# Patient Record
Sex: MALE | Race: WHITE | NOT HISPANIC OR LATINO | Employment: FULL TIME | URBAN - METROPOLITAN AREA
[De-identification: names, ages, dates, MRNs, and addresses within clinical notes are randomized per-mention and may not be internally consistent; named-entity substitution may affect disease eponyms.]

---

## 2023-02-24 ENCOUNTER — DOCUMENTATION (OUTPATIENT)
Dept: PRIMARY CARE | Facility: CLINIC | Age: 63
End: 2023-02-24
Payer: COMMERCIAL

## 2023-03-14 ENCOUNTER — PATIENT OUTREACH (OUTPATIENT)
Dept: PRIMARY CARE | Facility: CLINIC | Age: 63
End: 2023-03-14
Payer: COMMERCIAL

## 2023-03-14 DIAGNOSIS — Z79.4 TYPE 2 DIABETES MELLITUS WITH HYPERGLYCEMIA, WITH LONG-TERM CURRENT USE OF INSULIN (MULTI): ICD-10-CM

## 2023-03-14 DIAGNOSIS — E66.01 MORBID OBESITY (MULTI): ICD-10-CM

## 2023-03-14 DIAGNOSIS — E11.65 TYPE 2 DIABETES MELLITUS WITH HYPERGLYCEMIA, WITH LONG-TERM CURRENT USE OF INSULIN (MULTI): ICD-10-CM

## 2023-03-14 PROCEDURE — 99490 CHRNC CARE MGMT STAFF 1ST 20: CPT | Performed by: FAMILY MEDICINE

## 2023-03-14 PROCEDURE — 99439 CHRNC CARE MGMT STAF EA ADDL: CPT | Performed by: FAMILY MEDICINE

## 2023-03-14 RX ORDER — GABAPENTIN 600 MG/1
600 TABLET ORAL 3 TIMES DAILY
COMMUNITY
Start: 2023-02-20 | End: 2023-03-31 | Stop reason: DRUGHIGH

## 2023-03-14 RX ORDER — DULOXETIN HYDROCHLORIDE 60 MG/1
60 CAPSULE, DELAYED RELEASE ORAL DAILY
COMMUNITY
Start: 2023-02-20 | End: 2023-03-31 | Stop reason: DRUGHIGH

## 2023-03-14 RX ORDER — VALSARTAN AND HYDROCHLOROTHIAZIDE 320; 25 MG/1; MG/1
1 TABLET, FILM COATED ORAL DAILY
COMMUNITY
Start: 2023-02-20 | End: 2024-01-17 | Stop reason: SDUPTHER

## 2023-03-14 RX ORDER — DAPAGLIFLOZIN AND METFORMIN HYDROCHLORIDE 5; 1000 MG/1; MG/1
1 TABLET, FILM COATED, EXTENDED RELEASE ORAL 2 TIMES DAILY
COMMUNITY
Start: 2021-09-17 | End: 2024-01-17 | Stop reason: ALTCHOICE

## 2023-03-14 RX ORDER — SIMVASTATIN 10 MG/1
1 TABLET, FILM COATED ORAL DAILY
COMMUNITY
Start: 2020-09-28 | End: 2023-05-11

## 2023-03-14 RX ORDER — PEN NEEDLE, DIABETIC 32 GX 1/4"
NEEDLE, DISPOSABLE MISCELLANEOUS
COMMUNITY
Start: 2023-02-20 | End: 2023-09-26

## 2023-03-14 RX ORDER — METOPROLOL TARTRATE 100 MG/1
1 TABLET ORAL DAILY
COMMUNITY
Start: 2022-06-01 | End: 2024-04-25 | Stop reason: SDUPTHER

## 2023-03-14 RX ORDER — INSULIN ASPART 100 [IU]/ML
36 INJECTION, SOLUTION INTRAVENOUS; SUBCUTANEOUS
COMMUNITY
Start: 2021-09-13 | End: 2023-03-27 | Stop reason: ALTCHOICE

## 2023-03-14 RX ORDER — TIRZEPATIDE 7.5 MG/.5ML
12.5 INJECTION, SOLUTION SUBCUTANEOUS
COMMUNITY
Start: 2022-07-14 | End: 2023-03-27 | Stop reason: DRUGHIGH

## 2023-03-14 RX ORDER — PANTOPRAZOLE SODIUM 40 MG/1
1 TABLET, DELAYED RELEASE ORAL 2 TIMES DAILY
COMMUNITY
Start: 2020-09-14 | End: 2024-04-25 | Stop reason: SDUPTHER

## 2023-03-14 RX ORDER — INSULIN DEGLUDEC 200 U/ML
60 INJECTION, SOLUTION SUBCUTANEOUS 2 TIMES DAILY
COMMUNITY
Start: 2021-11-12 | End: 2023-03-27 | Stop reason: SDUPTHER

## 2023-03-14 RX ORDER — ALLOPURINOL 300 MG/1
1 TABLET ORAL DAILY
COMMUNITY
Start: 2021-11-23 | End: 2023-07-10 | Stop reason: SDUPTHER

## 2023-03-14 NOTE — CARE PLAN
Patient calls in regarding a terrible sinus headache that he has had for 5 days. He states that it is on the right side of his head and it can get very painful. At it's worst it becomes a 7-8 out of 10. He sometimes sees stars when he closes his eyes. He denies any sinus congestion or mucous production. In fact he feels like he is very dry. He has been using Afrin nasal spray, but states it only works for a few seconds and the pain comes back. He has also tried Aspirin and Cata-selzer but it only helps the pain for a few hours. Denies any weakness, slurred speech, or dizziness. He did have an episode yesterday where he actually fell out of bed. He does not know why. He just remembers waking up on the floor. We reviewed all his routine medications and his chart has been updated. He is asking if there is something else he should try for the headache, but I am not sure if this is really a sinus headache or not. Information being routed to PCP.

## 2023-03-15 NOTE — PROGRESS NOTES
Patient notified. He has to have his wife call me because she transports him. He wants to give this some more time before coming in. He states he is doing ok right now with analgesics. I did encourage an appt still.

## 2023-03-22 ENCOUNTER — PATIENT OUTREACH (OUTPATIENT)
Dept: PRIMARY CARE | Facility: CLINIC | Age: 63
End: 2023-03-22
Payer: COMMERCIAL

## 2023-03-23 ENCOUNTER — PATIENT OUTREACH (OUTPATIENT)
Dept: PRIMARY CARE | Facility: CLINIC | Age: 63
End: 2023-03-23
Payer: COMMERCIAL

## 2023-03-23 DIAGNOSIS — E11.65 TYPE 2 DIABETES MELLITUS WITH HYPERGLYCEMIA, WITH LONG-TERM CURRENT USE OF INSULIN (MULTI): ICD-10-CM

## 2023-03-23 DIAGNOSIS — Z79.4 TYPE 2 DIABETES MELLITUS WITH HYPERGLYCEMIA, WITH LONG-TERM CURRENT USE OF INSULIN (MULTI): ICD-10-CM

## 2023-03-23 DIAGNOSIS — I10 HYPERTENSION, UNSPECIFIED TYPE: ICD-10-CM

## 2023-03-23 DIAGNOSIS — E66.01 MORBID OBESITY (MULTI): ICD-10-CM

## 2023-03-23 PROBLEM — N52.9 ED (ERECTILE DYSFUNCTION): Status: ACTIVE | Noted: 2023-03-23

## 2023-03-23 PROBLEM — E78.5 HYPERLIPIDEMIA ASSOCIATED WITH TYPE 2 DIABETES MELLITUS (MULTI): Status: ACTIVE | Noted: 2023-03-23

## 2023-03-23 PROBLEM — G89.29 CHRONIC BILATERAL LOW BACK PAIN WITHOUT SCIATICA: Status: ACTIVE | Noted: 2023-03-23

## 2023-03-23 PROBLEM — M62.830 MUSCLE SPASM OF BACK: Status: ACTIVE | Noted: 2023-03-23

## 2023-03-23 PROBLEM — E55.9 VITAMIN D DEFICIENCY: Status: ACTIVE | Noted: 2023-03-23

## 2023-03-23 PROBLEM — F33.9 CHRONIC MAJOR DEPRESSIVE DISORDER, RECURRENT EPISODE (CMS-HCC): Status: ACTIVE | Noted: 2023-03-23

## 2023-03-23 PROBLEM — E78.2 MIXED HYPERLIPIDEMIA: Status: ACTIVE | Noted: 2023-03-23

## 2023-03-23 PROBLEM — M10.9 GOUT: Status: ACTIVE | Noted: 2023-03-23

## 2023-03-23 PROBLEM — R11.0 NAUSEA IN ADULT: Status: ACTIVE | Noted: 2023-03-23

## 2023-03-23 PROBLEM — E11.40 TYPE 2 DIABETES MELLITUS WITH DIABETIC NEUROPATHY, WITH LONG-TERM CURRENT USE OF INSULIN (MULTI): Status: ACTIVE | Noted: 2023-03-23

## 2023-03-23 PROBLEM — E11.69 HYPERLIPIDEMIA ASSOCIATED WITH TYPE 2 DIABETES MELLITUS (MULTI): Status: ACTIVE | Noted: 2023-03-23

## 2023-03-23 PROBLEM — K21.9 GASTROESOPHAGEAL REFLUX DISEASE WITHOUT ESOPHAGITIS: Status: ACTIVE | Noted: 2023-03-23

## 2023-03-23 PROBLEM — K11.20 SALIVARY GLAND INFECTION: Status: ACTIVE | Noted: 2023-03-23

## 2023-03-23 PROBLEM — B35.1 ONYCHOMYCOSIS OF TOENAIL: Status: ACTIVE | Noted: 2023-03-23

## 2023-03-23 PROBLEM — H61.23 BILATERAL IMPACTED CERUMEN: Status: ACTIVE | Noted: 2023-03-23

## 2023-03-23 PROBLEM — M54.50 CHRONIC BILATERAL LOW BACK PAIN WITHOUT SCIATICA: Status: ACTIVE | Noted: 2023-03-23

## 2023-03-23 NOTE — CARE PLAN
Patient returned my call. He is still dealing with sinus issues, but now it has traveled to his chest. He has a cough and congestion. Still having issues with a salivary gland as well. Taking OTC cold medicine every day. He was agreeable to an appt, so I made him one on Monday per his request with Dr. Jones. I asked him to please take a home covid test prior to this appt just to make sure before he comes in. He is agreeable.

## 2023-03-27 ENCOUNTER — OFFICE VISIT (OUTPATIENT)
Dept: PRIMARY CARE | Facility: CLINIC | Age: 63
End: 2023-03-27
Payer: COMMERCIAL

## 2023-03-27 VITALS
RESPIRATION RATE: 16 BRPM | DIASTOLIC BLOOD PRESSURE: 70 MMHG | OXYGEN SATURATION: 96 % | HEART RATE: 74 BPM | WEIGHT: 315 LBS | SYSTOLIC BLOOD PRESSURE: 124 MMHG | BODY MASS INDEX: 42.66 KG/M2 | HEIGHT: 72 IN | TEMPERATURE: 96.7 F

## 2023-03-27 DIAGNOSIS — B34.9 VIRAL SYNDROME: ICD-10-CM

## 2023-03-27 DIAGNOSIS — E53.8 VITAMIN B 12 DEFICIENCY: ICD-10-CM

## 2023-03-27 DIAGNOSIS — Z79.4 TYPE 2 DIABETES MELLITUS WITH DIABETIC NEUROPATHY, WITH LONG-TERM CURRENT USE OF INSULIN (MULTI): ICD-10-CM

## 2023-03-27 DIAGNOSIS — J40 BRONCHITIS: Primary | ICD-10-CM

## 2023-03-27 DIAGNOSIS — M10.00 IDIOPATHIC GOUT, UNSPECIFIED CHRONICITY, UNSPECIFIED SITE: ICD-10-CM

## 2023-03-27 DIAGNOSIS — E78.2 MIXED HYPERLIPIDEMIA: ICD-10-CM

## 2023-03-27 DIAGNOSIS — E55.9 VITAMIN D DEFICIENCY: ICD-10-CM

## 2023-03-27 DIAGNOSIS — E66.01 MORBID OBESITY WITH BODY MASS INDEX (BMI) OF 40.0 OR HIGHER (MULTI): ICD-10-CM

## 2023-03-27 DIAGNOSIS — R51.9 ACUTE NONINTRACTABLE HEADACHE, UNSPECIFIED HEADACHE TYPE: ICD-10-CM

## 2023-03-27 DIAGNOSIS — F33.9 CHRONIC MAJOR DEPRESSIVE DISORDER, RECURRENT EPISODE (CMS-HCC): ICD-10-CM

## 2023-03-27 DIAGNOSIS — Z00.00 ROUTINE GENERAL MEDICAL EXAMINATION AT A HEALTH CARE FACILITY: ICD-10-CM

## 2023-03-27 DIAGNOSIS — E11.40 TYPE 2 DIABETES MELLITUS WITH DIABETIC NEUROPATHY, WITH LONG-TERM CURRENT USE OF INSULIN (MULTI): ICD-10-CM

## 2023-03-27 LAB — POC HEMOGLOBIN A1C: 11.6 % (ref 4.2–6.5)

## 2023-03-27 PROCEDURE — 83036 HEMOGLOBIN GLYCOSYLATED A1C: CPT | Performed by: FAMILY MEDICINE

## 2023-03-27 PROCEDURE — 3074F SYST BP LT 130 MM HG: CPT | Performed by: FAMILY MEDICINE

## 2023-03-27 PROCEDURE — 3078F DIAST BP <80 MM HG: CPT | Performed by: FAMILY MEDICINE

## 2023-03-27 PROCEDURE — 1036F TOBACCO NON-USER: CPT | Performed by: FAMILY MEDICINE

## 2023-03-27 PROCEDURE — 99215 OFFICE O/P EST HI 40 MIN: CPT | Performed by: FAMILY MEDICINE

## 2023-03-27 RX ORDER — AMOXICILLIN AND CLAVULANATE POTASSIUM 875; 125 MG/1; MG/1
875 TABLET, FILM COATED ORAL 2 TIMES DAILY
Qty: 20 TABLET | Refills: 0 | Status: SHIPPED | OUTPATIENT
Start: 2023-03-27 | End: 2023-04-06

## 2023-03-27 RX ORDER — TIRZEPATIDE 12.5 MG/.5ML
12.5 INJECTION, SOLUTION SUBCUTANEOUS
Qty: 6 ML | Refills: 0 | Status: SHIPPED | OUTPATIENT
Start: 2023-03-27 | End: 2023-04-18 | Stop reason: SDUPTHER

## 2023-03-27 RX ORDER — INSULIN DEGLUDEC 200 U/ML
60 INJECTION, SOLUTION SUBCUTANEOUS 2 TIMES DAILY
Qty: 60 ML | Refills: 1 | Status: SHIPPED | OUTPATIENT
Start: 2023-03-27 | End: 2024-04-10 | Stop reason: SDUPTHER

## 2023-03-27 RX ORDER — INSULIN ASPART 100 [IU]/ML
36 INJECTION, SOLUTION INTRAVENOUS; SUBCUTANEOUS
Qty: 100 ML | Refills: 0 | Status: SHIPPED | OUTPATIENT
Start: 2023-03-27 | End: 2024-04-10 | Stop reason: SDUPTHER

## 2023-03-27 ASSESSMENT — ENCOUNTER SYMPTOMS
OCCASIONAL FEELINGS OF UNSTEADINESS: 0
DEPRESSION: 0
LOSS OF SENSATION IN FEET: 0

## 2023-03-27 ASSESSMENT — PATIENT HEALTH QUESTIONNAIRE - PHQ9
1. LITTLE INTEREST OR PLEASURE IN DOING THINGS: NOT AT ALL
2. FEELING DOWN, DEPRESSED OR HOPELESS: NOT AT ALL
SUM OF ALL RESPONSES TO PHQ9 QUESTIONS 1 AND 2: 0

## 2023-03-27 NOTE — PROGRESS NOTES
Subjective   Patient ID: Reza Red is a 62 y.o. male who presents for Hypertension, Migraine (X 10 days), swollen right salivary gland (Nothing has seemed to help), and Sinusitis (X 10 days to 2 weeks ).  Today he is accompanied by alone.     HPI  Has a parotid gland infection for 3-4 weeks- was on antibiotics     Review of Systems   Constitutional:  Positive for chills and fever. Negative for activity change, appetite change, diaphoresis, fatigue and unexpected weight change.   HENT:  Positive for dental problem, ear pain, facial swelling and sore throat. Negative for congestion, drooling, ear discharge, hearing loss, nosebleeds, postnasal drip, rhinorrhea, sinus pressure, sinus pain, sneezing, tinnitus, trouble swallowing and voice change.    Eyes:  Negative for pain, discharge, redness, itching and visual disturbance.   Respiratory:  Negative for cough, chest tightness, shortness of breath and wheezing.    Cardiovascular:  Negative for chest pain, palpitations and leg swelling.   Gastrointestinal:  Negative for abdominal pain, blood in stool, constipation, diarrhea, nausea and vomiting.   Endocrine: Negative for cold intolerance, heat intolerance, polydipsia, polyphagia and polyuria.   Genitourinary:  Negative for decreased urine volume, dysuria, flank pain, frequency, hematuria and urgency.   Musculoskeletal:  Positive for joint swelling and neck pain. Negative for arthralgias, back pain, gait problem and myalgias.   Skin:  Negative for color change, pallor, rash and wound.   Neurological:  Positive for weakness. Negative for dizziness.   Hematological:  Positive for adenopathy. Does not bruise/bleed easily.   Psychiatric/Behavioral:  Negative for agitation, behavioral problems and sleep disturbance. The patient is not nervous/anxious.        Objective   /70   Pulse 74   Temp 35.9 °C (96.7 °F)   Resp 16   Ht 1.829 m (6')   Wt (!) 165 kg (364 lb 6.4 oz)   SpO2 96%   BMI 49.42 kg/m²   BSA: 2.9  meters squared  Growth percentiles: Facility age limit for growth %chandrakant is 20 years. Facility age limit for growth %chandrakant is 20 years.     Physical Exam  Vitals and nursing note reviewed. Exam conducted with a chaperone present.   Constitutional:       General: He is not in acute distress.     Appearance: He is normal weight. He is ill-appearing and diaphoretic. He is not toxic-appearing.   HENT:      Head: Normocephalic.      Right Ear: Tympanic membrane, ear canal and external ear normal. There is no impacted cerumen.      Left Ear: Tympanic membrane, ear canal and external ear normal. There is no impacted cerumen.      Nose: Nose normal. No congestion or rhinorrhea.      Mouth/Throat:      Mouth: Mucous membranes are moist.      Pharynx: Oropharynx is clear. No oropharyngeal exudate or posterior oropharyngeal erythema.   Eyes:      General: No scleral icterus.        Right eye: No discharge.         Left eye: No discharge.      Extraocular Movements: Extraocular movements intact.      Conjunctiva/sclera: Conjunctivae normal.      Pupils: Pupils are equal, round, and reactive to light.   Neck:      Vascular: No carotid bruit.   Cardiovascular:      Rate and Rhythm: Normal rate and regular rhythm.      Pulses: Normal pulses.      Heart sounds: Normal heart sounds. No murmur heard.     No gallop.   Pulmonary:      Effort: No respiratory distress.      Breath sounds: No wheezing or rhonchi.   Chest:      Chest wall: No tenderness.   Abdominal:      General: Abdomen is flat. Bowel sounds are normal.      Palpations: Abdomen is soft. There is no mass.      Tenderness: There is no abdominal tenderness. There is no guarding or rebound.      Hernia: No hernia is present.   Musculoskeletal:         General: Swelling, tenderness and deformity present. Normal range of motion.      Cervical back: Normal range of motion. Tenderness present. No rigidity.      Right lower leg: No edema.      Left lower leg: No edema.    Lymphadenopathy:      Cervical: Cervical adenopathy present.   Skin:     General: Skin is warm.      Coloration: Skin is not pale.      Findings: No bruising, erythema or rash.   Neurological:      General: No focal deficit present.      Mental Status: He is alert and oriented to person, place, and time.      Sensory: No sensory deficit.      Coordination: Coordination normal.      Gait: Gait normal.      Deep Tendon Reflexes: Reflexes normal.   Psychiatric:         Mood and Affect: Mood normal.         Behavior: Behavior normal.         Thought Content: Thought content normal.         Judgment: Judgment normal.       Diabetic foot exam:   Left: Reflexes 4+    Vibratory sensation normal   Proprioception normal   Sharp/dull discrimination normal   Filament test present  Right: Reflexes 4+    Vibratory sensation normal   Proprioception normal   Sharp/dull discrimination normal   Filament test present   Assessment/Plan   Problem List Items Addressed This Visit       Gout    Relevant Orders    Uric Acid    Mixed hyperlipidemia    Morbid obesity with body mass index (BMI) of 40.0 or higher (CMS/[facility])    Vitamin D deficiency    Relevant Orders    Vitamin D, Total    Chronic major depressive disorder, recurrent episode (CMS/[facility])    Relevant Medications    DULoxetine (Cymbalta) 60 mg DR capsule    Type 2 diabetes mellitus with diabetic neuropathy, with long-term current use of insulin (CMS/[facility])    Relevant Medications    Tresiba FlexTouch U-200 200 unit/mL (3 mL) injection    NovoLOG FlexPen U-100 Insulin 100 unit/mL (3 mL) pen    tirzepatide (Mounjaro) 12.5 mg/0.5 mL pen injector    gabapentin (Neurontin) 600 mg tablet    Other Relevant Orders    POCT glycosylated hemoglobin (Hb A1C) manually resulted (Completed)    Lipid Panel    CBC and Auto Differential    Comprehensive Metabolic Panel     Other Visit Diagnoses       Bronchitis    -  Primary    Routine general medical examination at a health care facility         Vitamin B 12 deficiency        Relevant Orders    Vitamin B12    Acute nonintractable headache, unspecified headache type        Relevant Medications    amoxicillin-pot clavulanate (Augmentin) 875-125 mg tablet    Other Relevant Orders    Sedimentation Rate    C-Reactive Protein    TSH with reflex to Free T4 if abnormal    Viral syndrome        Relevant Medications    amoxicillin-pot clavulanate (Augmentin) 875-125 mg tablet        Assessment/Plan   Diabetes mellitus Type II, under poor control.  Discussed general issues about diabetes pathophysiology and management.  Counseling at today's visit: discussed the need for weight loss, focused on the need for regular aerobic exercise, focused on the need to adhere to the prescribed ADA diet, discussed the advantages of a diet low in carbohydrates, discussed sick day management, and discussed management of hypoglycemic episodes.  Educational material distributed.  Addressed ADA diet.  Suggested low cholesterol diet.  Encouraged aerobic exercise.

## 2023-03-31 RX ORDER — GABAPENTIN 600 MG/1
600 TABLET ORAL 3 TIMES DAILY
Status: SHIPPED | COMMUNITY
Start: 2023-03-31 | End: 2024-01-17 | Stop reason: SDUPTHER

## 2023-03-31 RX ORDER — DULOXETIN HYDROCHLORIDE 60 MG/1
60 CAPSULE, DELAYED RELEASE ORAL DAILY
Status: SHIPPED | COMMUNITY
Start: 2023-03-31 | End: 2023-05-11

## 2023-03-31 ASSESSMENT — ENCOUNTER SYMPTOMS
DYSURIA: 0
FEVER: 1
FREQUENCY: 0
EYE REDNESS: 0
CHEST TIGHTNESS: 0
FATIGUE: 0
CHILLS: 1
NECK PAIN: 1
ACTIVITY CHANGE: 0
BLOOD IN STOOL: 0
AGITATION: 0
VOICE CHANGE: 0
EYE PAIN: 0
FACIAL SWELLING: 1
TROUBLE SWALLOWING: 0
EYE DISCHARGE: 0
COUGH: 0
MYALGIAS: 0
NERVOUS/ANXIOUS: 0
JOINT SWELLING: 1
VOMITING: 0
DIARRHEA: 0
ADENOPATHY: 1
WEAKNESS: 1
POLYDIPSIA: 0
RHINORRHEA: 0
SHORTNESS OF BREATH: 0
HEMATURIA: 0
SORE THROAT: 1
ARTHRALGIAS: 0
SINUS PAIN: 0
BRUISES/BLEEDS EASILY: 0
SINUS PRESSURE: 0
APPETITE CHANGE: 0
WHEEZING: 0
DIAPHORESIS: 0
FLANK PAIN: 0
COLOR CHANGE: 0
CONSTIPATION: 0
SLEEP DISTURBANCE: 0
UNEXPECTED WEIGHT CHANGE: 0
NAUSEA: 0
DIZZINESS: 0
EYE ITCHING: 0
BACK PAIN: 0
ABDOMINAL PAIN: 0
WOUND: 0
PALPITATIONS: 0
POLYPHAGIA: 0

## 2023-04-18 ENCOUNTER — PATIENT OUTREACH (OUTPATIENT)
Dept: PRIMARY CARE | Facility: CLINIC | Age: 63
End: 2023-04-18
Payer: COMMERCIAL

## 2023-04-18 DIAGNOSIS — E11.65 TYPE 2 DIABETES MELLITUS WITH HYPERGLYCEMIA, WITH LONG-TERM CURRENT USE OF INSULIN (MULTI): ICD-10-CM

## 2023-04-18 DIAGNOSIS — Z79.4 TYPE 2 DIABETES MELLITUS WITH HYPERGLYCEMIA, WITH LONG-TERM CURRENT USE OF INSULIN (MULTI): ICD-10-CM

## 2023-04-18 DIAGNOSIS — E11.40 TYPE 2 DIABETES MELLITUS WITH DIABETIC NEUROPATHY, WITH LONG-TERM CURRENT USE OF INSULIN (MULTI): ICD-10-CM

## 2023-04-18 DIAGNOSIS — Z79.4 TYPE 2 DIABETES MELLITUS WITH DIABETIC NEUROPATHY, WITH LONG-TERM CURRENT USE OF INSULIN (MULTI): ICD-10-CM

## 2023-04-18 DIAGNOSIS — E66.01 MORBID OBESITY WITH BODY MASS INDEX (BMI) OF 40.0 OR HIGHER (MULTI): ICD-10-CM

## 2023-04-18 PROCEDURE — 99490 CHRNC CARE MGMT STAFF 1ST 20: CPT | Performed by: FAMILY MEDICINE

## 2023-04-18 RX ORDER — TIRZEPATIDE 12.5 MG/.5ML
12.5 INJECTION, SOLUTION SUBCUTANEOUS
Qty: 6 ML | Refills: 0 | Status: SHIPPED | OUTPATIENT
Start: 2023-04-18 | End: 2023-06-12 | Stop reason: SDUPTHER

## 2023-04-18 NOTE — CARE PLAN
Saw a missed call from patient. Returned his call. Patient was trying to see what time his next appt was. I confirmed it is on 5/4 at 9a. I wont be able to see him that day as I will be out of the office. But I did remind him he needs to get bw done prior to the appt and he needs to be fasting 10-12 hours. Patient is doing much better after he was given an antibiotic for his sinus problems and a salivary stone. He feels like there is still scar tissue there, but he is no longer waking up with a terrible taste in his mouth. He states the antibiotic seemed to do the trick. He states he has lost 60 pounds and hopes to lose some more by the time of his appt. He states his glucose levels are still high but starting to come down some. He did ask for a refill of his mounjaro which I will send to PCP to approve. No other concerns raised by patient.

## 2023-05-04 ENCOUNTER — APPOINTMENT (OUTPATIENT)
Dept: PRIMARY CARE | Facility: CLINIC | Age: 63
End: 2023-05-04
Payer: COMMERCIAL

## 2023-05-11 DIAGNOSIS — F33.9 CHRONIC MAJOR DEPRESSIVE DISORDER, RECURRENT EPISODE (CMS-HCC): ICD-10-CM

## 2023-05-11 RX ORDER — DULOXETIN HYDROCHLORIDE 60 MG/1
CAPSULE, DELAYED RELEASE ORAL
Qty: 90 CAPSULE | Refills: 0 | Status: SHIPPED | OUTPATIENT
Start: 2023-05-11 | End: 2023-07-10 | Stop reason: SDUPTHER

## 2023-05-11 RX ORDER — SIMVASTATIN 10 MG/1
TABLET, FILM COATED ORAL
Qty: 90 TABLET | Refills: 0 | Status: SHIPPED | OUTPATIENT
Start: 2023-05-11 | End: 2024-01-17 | Stop reason: SDUPTHER

## 2023-06-12 ENCOUNTER — PATIENT OUTREACH (OUTPATIENT)
Dept: PRIMARY CARE | Facility: CLINIC | Age: 63
End: 2023-06-12
Payer: COMMERCIAL

## 2023-06-12 DIAGNOSIS — E11.65 TYPE 2 DIABETES MELLITUS WITH HYPERGLYCEMIA, WITH LONG-TERM CURRENT USE OF INSULIN (MULTI): ICD-10-CM

## 2023-06-12 DIAGNOSIS — Z79.4 TYPE 2 DIABETES MELLITUS WITH DIABETIC NEUROPATHY, WITH LONG-TERM CURRENT USE OF INSULIN (MULTI): ICD-10-CM

## 2023-06-12 DIAGNOSIS — Z79.4 TYPE 2 DIABETES MELLITUS WITH HYPERGLYCEMIA, WITH LONG-TERM CURRENT USE OF INSULIN (MULTI): ICD-10-CM

## 2023-06-12 DIAGNOSIS — E66.01 MORBID OBESITY WITH BODY MASS INDEX (BMI) OF 40.0 OR HIGHER (MULTI): ICD-10-CM

## 2023-06-12 DIAGNOSIS — E11.40 TYPE 2 DIABETES MELLITUS WITH DIABETIC NEUROPATHY, WITH LONG-TERM CURRENT USE OF INSULIN (MULTI): ICD-10-CM

## 2023-06-12 PROCEDURE — 99490 CHRNC CARE MGMT STAFF 1ST 20: CPT | Performed by: FAMILY MEDICINE

## 2023-06-12 NOTE — PROGRESS NOTES
Call received from patient. He is in need of a refill of his Mounjaro from [facility] pharmacy, but he needs to make sure they have his new address since he did move. The address is correct in Epic already. I did call the pharmacy and gave them the new address. Then I pended an order for the Mounjaro to his PCP. We also scheduled the patient's annual physical exam for July 12th. He has active lab orders he needs to complete preferably not the day of his appt so we can have the results back to discuss. He has not been checking his glucose levels, but states he knows his sugars are better because he is not urinating multiple times throughout the day and night. He does promise he will place a sensor on his arm prior to his next appt. He states that the move has been very positive for him. He is able to be more active and cook more at home. He feels he has lost additional weight since his last appt. He does not know his exact weight because he does not have a scale at home that can weigh him. His last weight back in March was 364 and he feels he is somewhere around 340 or 350 based on the way his clothes are now fitting. He feels like his neuropathy is better as well because of this change. He is still complaining of daily sinus headaches, but states he is ok waiting until his physical to discuss with the doctor.

## 2023-06-13 RX ORDER — TIRZEPATIDE 12.5 MG/.5ML
12.5 INJECTION, SOLUTION SUBCUTANEOUS
Qty: 6 ML | Refills: 1 | Status: SHIPPED | OUTPATIENT
Start: 2023-06-13 | End: 2023-08-16 | Stop reason: SDUPTHER

## 2023-07-10 ENCOUNTER — PATIENT OUTREACH (OUTPATIENT)
Dept: PRIMARY CARE | Facility: CLINIC | Age: 63
End: 2023-07-10
Payer: COMMERCIAL

## 2023-07-10 DIAGNOSIS — Z79.4 TYPE 2 DIABETES MELLITUS WITH HYPERGLYCEMIA, WITH LONG-TERM CURRENT USE OF INSULIN (MULTI): ICD-10-CM

## 2023-07-10 DIAGNOSIS — F33.9 CHRONIC MAJOR DEPRESSIVE DISORDER, RECURRENT EPISODE (CMS-HCC): ICD-10-CM

## 2023-07-10 DIAGNOSIS — E11.65 TYPE 2 DIABETES MELLITUS WITH HYPERGLYCEMIA, WITH LONG-TERM CURRENT USE OF INSULIN (MULTI): ICD-10-CM

## 2023-07-10 DIAGNOSIS — M10.00 IDIOPATHIC GOUT, UNSPECIFIED CHRONICITY, UNSPECIFIED SITE: ICD-10-CM

## 2023-07-10 DIAGNOSIS — Z12.5 SCREENING PSA (PROSTATE SPECIFIC ANTIGEN): ICD-10-CM

## 2023-07-10 PROCEDURE — 99490 CHRNC CARE MGMT STAFF 1ST 20: CPT | Performed by: FAMILY MEDICINE

## 2023-07-10 RX ORDER — ALLOPURINOL 300 MG/1
300 TABLET ORAL DAILY
Qty: 90 TABLET | Refills: 0 | Status: SHIPPED | OUTPATIENT
Start: 2023-07-10 | End: 2024-04-09

## 2023-07-10 RX ORDER — DULOXETIN HYDROCHLORIDE 60 MG/1
60 CAPSULE, DELAYED RELEASE ORAL DAILY
Qty: 90 CAPSULE | Refills: 0 | Status: SHIPPED | OUTPATIENT
Start: 2023-07-10 | End: 2024-04-09

## 2023-07-10 NOTE — PROGRESS NOTES
Patient called in to change his physical exam appt to be in August so we could set this up on the same day as his wife. New appt for 8/10. Lab work order already placed, except A1C. I will add this order to his current order for blood work.   Patient has not been wearing his clau. He states that he does feel better overall. He is urinating less at nighttime. He has lost a bit of weight. He has had to buy smaller pants and underwear. He states he will put one on so we can get some current data from it. He is trying to move more. He did mention he got a Power Wheelchair although we did not talk about times when he uses it.   He is very happy with his move to his new home. He has 2 cats now and is quite happy about this.   Patient needs a refill on his Mood medication and gout medication. Presumably his Duloxetine and Allopurinol to [facility] pharmacy. I will pend to PCP.

## 2023-07-12 ENCOUNTER — APPOINTMENT (OUTPATIENT)
Dept: PRIMARY CARE | Facility: CLINIC | Age: 63
End: 2023-07-12
Payer: COMMERCIAL

## 2023-08-10 ENCOUNTER — OFFICE VISIT (OUTPATIENT)
Dept: PRIMARY CARE | Facility: CLINIC | Age: 63
End: 2023-08-10
Payer: COMMERCIAL

## 2023-08-10 ENCOUNTER — LAB (OUTPATIENT)
Dept: LAB | Facility: LAB | Age: 63
End: 2023-08-10
Payer: COMMERCIAL

## 2023-08-10 VITALS
OXYGEN SATURATION: 95 % | WEIGHT: 315 LBS | DIASTOLIC BLOOD PRESSURE: 86 MMHG | SYSTOLIC BLOOD PRESSURE: 126 MMHG | BODY MASS INDEX: 42.66 KG/M2 | TEMPERATURE: 98.2 F | HEIGHT: 72 IN | HEART RATE: 110 BPM | RESPIRATION RATE: 16 BRPM

## 2023-08-10 DIAGNOSIS — G44.52 NEW PERSISTENT DAILY HEADACHE: ICD-10-CM

## 2023-08-10 DIAGNOSIS — Z12.12 ENCOUNTER FOR SCREENING FOR COLORECTAL MALIGNANT NEOPLASM: ICD-10-CM

## 2023-08-10 DIAGNOSIS — E66.01 MORBID OBESITY WITH BODY MASS INDEX (BMI) OF 40.0 OR HIGHER (MULTI): ICD-10-CM

## 2023-08-10 DIAGNOSIS — Z00.00 ENCOUNTER FOR ANNUAL GENERAL MEDICAL EXAMINATION WITHOUT ABNORMAL FINDINGS IN ADULT: ICD-10-CM

## 2023-08-10 DIAGNOSIS — F33.9 CHRONIC MAJOR DEPRESSIVE DISORDER, RECURRENT EPISODE (CMS-HCC): ICD-10-CM

## 2023-08-10 DIAGNOSIS — E11.65 TYPE 2 DIABETES MELLITUS WITH HYPERGLYCEMIA, WITH LONG-TERM CURRENT USE OF INSULIN (MULTI): ICD-10-CM

## 2023-08-10 DIAGNOSIS — Z00.00 ENCOUNTER FOR ANNUAL GENERAL MEDICAL EXAMINATION WITHOUT ABNORMAL FINDINGS IN ADULT: Primary | ICD-10-CM

## 2023-08-10 DIAGNOSIS — E11.69 HYPERLIPIDEMIA ASSOCIATED WITH TYPE 2 DIABETES MELLITUS (MULTI): ICD-10-CM

## 2023-08-10 DIAGNOSIS — M10.00 IDIOPATHIC GOUT, UNSPECIFIED CHRONICITY, UNSPECIFIED SITE: ICD-10-CM

## 2023-08-10 DIAGNOSIS — Z12.5 ENCOUNTER FOR SCREENING FOR MALIGNANT NEOPLASM OF PROSTATE: ICD-10-CM

## 2023-08-10 DIAGNOSIS — H53.9 VISUAL CHANGES: ICD-10-CM

## 2023-08-10 DIAGNOSIS — H61.23 EXCESSIVE CERUMEN IN BOTH EAR CANALS: ICD-10-CM

## 2023-08-10 DIAGNOSIS — Z79.4 TYPE 2 DIABETES MELLITUS WITH HYPERGLYCEMIA, WITH LONG-TERM CURRENT USE OF INSULIN (MULTI): ICD-10-CM

## 2023-08-10 DIAGNOSIS — Z79.4 TYPE 2 DIABETES MELLITUS WITH DIABETIC NEUROPATHY, WITH LONG-TERM CURRENT USE OF INSULIN (MULTI): ICD-10-CM

## 2023-08-10 DIAGNOSIS — Z23 NEED FOR TETANUS, DIPHTHERIA, AND ACELLULAR PERTUSSIS (TDAP) VACCINE: ICD-10-CM

## 2023-08-10 DIAGNOSIS — Z12.11 ENCOUNTER FOR SCREENING FOR COLORECTAL MALIGNANT NEOPLASM: ICD-10-CM

## 2023-08-10 DIAGNOSIS — E78.5 HYPERLIPIDEMIA ASSOCIATED WITH TYPE 2 DIABETES MELLITUS (MULTI): ICD-10-CM

## 2023-08-10 DIAGNOSIS — E11.40 TYPE 2 DIABETES MELLITUS WITH DIABETIC NEUROPATHY, WITH LONG-TERM CURRENT USE OF INSULIN (MULTI): ICD-10-CM

## 2023-08-10 DIAGNOSIS — Z12.5 SCREENING PSA (PROSTATE SPECIFIC ANTIGEN): ICD-10-CM

## 2023-08-10 DIAGNOSIS — E55.9 VITAMIN D DEFICIENCY: ICD-10-CM

## 2023-08-10 PROBLEM — R11.0 NAUSEA IN ADULT: Status: RESOLVED | Noted: 2023-03-23 | Resolved: 2023-08-10

## 2023-08-10 PROBLEM — K11.20 SALIVARY GLAND INFECTION: Status: RESOLVED | Noted: 2023-03-23 | Resolved: 2023-08-10

## 2023-08-10 LAB
POC ALBUMIN /CREATININE RATIO MANUALLY ENTERED: ABNORMAL UG/MG CREAT
POC HEMOGLOBIN A1C: 12.8 % (ref 4.2–6.5)
POC URINE ALBUMIN: 30 MG/L
POC URINE CREATININE: 50 MG/DL

## 2023-08-10 PROCEDURE — 99214 OFFICE O/P EST MOD 30 MIN: CPT | Performed by: FAMILY MEDICINE

## 2023-08-10 PROCEDURE — 84153 ASSAY OF PSA TOTAL: CPT

## 2023-08-10 PROCEDURE — 1036F TOBACCO NON-USER: CPT | Performed by: FAMILY MEDICINE

## 2023-08-10 PROCEDURE — 83525 ASSAY OF INSULIN: CPT

## 2023-08-10 PROCEDURE — 69209 REMOVE IMPACTED EAR WAX UNI: CPT | Performed by: FAMILY MEDICINE

## 2023-08-10 PROCEDURE — 84402 ASSAY OF FREE TESTOSTERONE: CPT

## 2023-08-10 PROCEDURE — 82044 UR ALBUMIN SEMIQUANTITATIVE: CPT | Performed by: FAMILY MEDICINE

## 2023-08-10 PROCEDURE — 84550 ASSAY OF BLOOD/URIC ACID: CPT

## 2023-08-10 PROCEDURE — 84443 ASSAY THYROID STIM HORMONE: CPT

## 2023-08-10 PROCEDURE — 99396 PREV VISIT EST AGE 40-64: CPT | Performed by: FAMILY MEDICINE

## 2023-08-10 PROCEDURE — 36415 COLL VENOUS BLD VENIPUNCTURE: CPT

## 2023-08-10 PROCEDURE — 3079F DIAST BP 80-89 MM HG: CPT | Performed by: FAMILY MEDICINE

## 2023-08-10 PROCEDURE — 2028F FOOT EXAM PERFORMED: CPT | Performed by: FAMILY MEDICINE

## 2023-08-10 PROCEDURE — 83036 HEMOGLOBIN GLYCOSYLATED A1C: CPT

## 2023-08-10 PROCEDURE — 86803 HEPATITIS C AB TEST: CPT

## 2023-08-10 PROCEDURE — 90715 TDAP VACCINE 7 YRS/> IM: CPT | Performed by: FAMILY MEDICINE

## 2023-08-10 PROCEDURE — 90471 IMMUNIZATION ADMIN: CPT | Performed by: FAMILY MEDICINE

## 2023-08-10 PROCEDURE — 87389 HIV-1 AG W/HIV-1&-2 AB AG IA: CPT

## 2023-08-10 PROCEDURE — 84403 ASSAY OF TOTAL TESTOSTERONE: CPT

## 2023-08-10 PROCEDURE — 85652 RBC SED RATE AUTOMATED: CPT

## 2023-08-10 PROCEDURE — 83735 ASSAY OF MAGNESIUM: CPT

## 2023-08-10 PROCEDURE — 86140 C-REACTIVE PROTEIN: CPT

## 2023-08-10 PROCEDURE — 3074F SYST BP LT 130 MM HG: CPT | Performed by: FAMILY MEDICINE

## 2023-08-10 PROCEDURE — 3046F HEMOGLOBIN A1C LEVEL >9.0%: CPT | Performed by: FAMILY MEDICINE

## 2023-08-10 ASSESSMENT — ENCOUNTER SYMPTOMS
DIZZINESS: 0
COUGH: 0
SLEEP DISTURBANCE: 0
POLYDIPSIA: 0
SORE THROAT: 0
PALPITATIONS: 0
DIARRHEA: 0
FREQUENCY: 0
ACTIVITY CHANGE: 0
RHINORRHEA: 0
EYE DISCHARGE: 0
FATIGUE: 0
OCCASIONAL FEELINGS OF UNSTEADINESS: 0
FACIAL SWELLING: 0
SHORTNESS OF BREATH: 0
ABDOMINAL PAIN: 0
VOICE CHANGE: 0
APPETITE CHANGE: 0
NERVOUS/ANXIOUS: 0
NECK STIFFNESS: 0
DYSURIA: 0
BACK PAIN: 0
DEPRESSION: 0
WOUND: 0
EYE REDNESS: 0
CHILLS: 0
LOSS OF SENSATION IN FEET: 0
CONSTIPATION: 0
COLOR CHANGE: 0
TROUBLE SWALLOWING: 0
SINUS PRESSURE: 0
HEMATURIA: 0
FEVER: 0
NAUSEA: 0
CHEST TIGHTNESS: 0
ARTHRALGIAS: 0
POLYPHAGIA: 0
JOINT SWELLING: 0
VOMITING: 0
AGITATION: 0
EYE PAIN: 0
EYE ITCHING: 0
BLOOD IN STOOL: 0
FLANK PAIN: 0
WHEEZING: 0
SINUS PAIN: 0
MYALGIAS: 0
DIAPHORESIS: 0
ADENOPATHY: 0
UNEXPECTED WEIGHT CHANGE: 0
BRUISES/BLEEDS EASILY: 0

## 2023-08-10 ASSESSMENT — PATIENT HEALTH QUESTIONNAIRE - PHQ9
SUM OF ALL RESPONSES TO PHQ9 QUESTIONS 1 AND 2: 0
2. FEELING DOWN, DEPRESSED OR HOPELESS: NOT AT ALL
1. LITTLE INTEREST OR PLEASURE IN DOING THINGS: NOT AT ALL

## 2023-08-10 NOTE — PATIENT INSTRUCTIONS
F/U in 3 months for DM and htn and headaches  Call if headaches do not improve with Zyrtec daily

## 2023-08-10 NOTE — PROGRESS NOTES
Subjective   Patient ID: Reza Red is a 62 y.o. male who presents for Annual Exam.  Today he is accompanied by accompanied by spouse.     Well Adult Physical   Patient here for a comprehensive physical exam.The patient reports problems - daily persistent headaches      Do you take any herbs or supplements that were not prescribed by a doctor? no   Are you taking calcium supplements? no   Are you taking aspirin daily? no     History:  Date last prostate exam: 2021  Date last PSA: 2022          Having persistent headaches for the last 2 months  Taking tylenol daily  Saline helps  Has flashing lights and visual changes about 1 week ago.    Review of Systems   Constitutional:  Negative for activity change, appetite change, chills, diaphoresis, fatigue, fever and unexpected weight change.   HENT:  Negative for congestion, dental problem, drooling, ear discharge, ear pain, facial swelling, hearing loss, nosebleeds, postnasal drip, rhinorrhea, sinus pressure, sinus pain, sneezing, sore throat, tinnitus, trouble swallowing and voice change.    Eyes:  Negative for pain, discharge, redness, itching and visual disturbance.   Respiratory:  Negative for cough, chest tightness, shortness of breath and wheezing.    Cardiovascular:  Negative for chest pain, palpitations and leg swelling.   Gastrointestinal:  Negative for abdominal pain, blood in stool, constipation, diarrhea, nausea and vomiting.   Endocrine: Negative for cold intolerance, heat intolerance, polydipsia, polyphagia and polyuria.   Genitourinary:  Negative for decreased urine volume, dysuria, flank pain, frequency, hematuria and urgency.   Musculoskeletal:  Negative for arthralgias, back pain, gait problem, joint swelling, myalgias and neck stiffness.   Skin:  Negative for color change, pallor, rash and wound.   Neurological:  Negative for dizziness.   Hematological:  Negative for adenopathy. Does not bruise/bleed easily.   Psychiatric/Behavioral:  Negative for  agitation, behavioral problems and sleep disturbance. The patient is not nervous/anxious.        Objective   Blood Pressure 126/86   Pulse 110   Temperature 36.8 °C (98.2 °F)   Respiration 16   Height 1.829 m (6')   Weight (Abnormal) 157 kg (345 lb 12.8 oz)   Oxygen Saturation 95%   Body Mass Index 46.90 kg/m²   BSA: 2.82 meters squared  Growth percentiles: Facility age limit for growth %chandrakant is 20 years. Facility age limit for growth %chandrakant is 20 years.     Physical Exam  Vitals and nursing note reviewed. Exam conducted with a chaperone present.   Constitutional:       General: He is not in acute distress.     Appearance: Normal appearance. He is normal weight. He is not ill-appearing.   HENT:      Head: Normocephalic.      Right Ear: Tympanic membrane, ear canal and external ear normal.      Left Ear: Tympanic membrane, ear canal and external ear normal.      Nose: Nose normal. No rhinorrhea.      Mouth/Throat:      Mouth: Mucous membranes are moist.      Pharynx: Oropharynx is clear.   Eyes:      Extraocular Movements: Extraocular movements intact.      Conjunctiva/sclera: Conjunctivae normal.      Pupils: Pupils are equal, round, and reactive to light.   Cardiovascular:      Rate and Rhythm: Normal rate and regular rhythm.      Pulses: Normal pulses.      Heart sounds: Normal heart sounds.   Pulmonary:      Effort: Pulmonary effort is normal. No respiratory distress.      Breath sounds: Normal breath sounds. No wheezing.   Abdominal:      General: Abdomen is flat. Bowel sounds are normal.      Palpations: Abdomen is soft.      Tenderness: There is no abdominal tenderness. There is no guarding or rebound.      Hernia: No hernia is present.   Musculoskeletal:         General: Normal range of motion.      Cervical back: Normal range of motion and neck supple. No rigidity or tenderness.      Right lower leg: No edema.      Left lower leg: No edema.   Lymphadenopathy:      Cervical: No cervical adenopathy.    Skin:     General: Skin is warm and dry.      Capillary Refill: Capillary refill takes more than 3 seconds.   Neurological:      General: No focal deficit present.      Mental Status: He is alert and oriented to person, place, and time.      Sensory: No sensory deficit.      Motor: No weakness.      Coordination: Coordination normal.   Psychiatric:         Mood and Affect: Mood normal.         Behavior: Behavior normal.         Thought Content: Thought content normal.         Judgment: Judgment normal.       Diabetic foot exam:   Left: Reflexes 4+    Vibratory sensation normal   Proprioception normal   Sharp/dull discrimination normal   Filament test present  Right: Reflexes 4+    Vibratory sensation normal   Proprioception normal   Sharp/dull discrimination normal   Filament test present    Assessment/Plan   Problem List Items Addressed This Visit       Gout    Relevant Orders    Uric Acid    Vitamin D deficiency    Relevant Orders    Vitamin D, Total    Diabetes mellitus with hyperglycemia, with long-term current use of insulin (CMS/[facility])    Relevant Orders    POCT Albumin random urine manually resulted    POCT glycosylated hemoglobin (Hb A1C) manually resulted     Diabetes Foot Exam (Completed)     Other Visit Diagnoses       Encounter for annual general medical examination without abnormal findings in adult    -  Primary    Relevant Orders    CBC and Auto Differential    Comprehensive Metabolic Panel    Lipid Panel    HIV 1/2 Antigen/Antibody Screen with Reflex to Confirmation    Hepatitis C Antibody    CT cardiac scoring wo IV contrast    Encounter for screening for colorectal malignant neoplasm        Relevant Orders    POCT Fecal occult immunoassay-ifob manually resulted    Cologuard® colon cancer screening    Encounter for screening for malignant neoplasm of prostate        Relevant Orders    Prostate Specific Antigen, Screen    Vitamin B12 deficiency        Relevant Orders    Vitamin B12    Need for  "tetanus, diphtheria, and acellular pertussis (Tdap) vaccine        Relevant Orders    Tdap vaccine, age 10 years and older (BOOSTRIX)    New persistent daily headache        Relevant Orders    C-Reactive Protein    Sedimentation Rate    TSH with reflex to Free T4 if abnormal    Magnesium    Morbid obesity (CMS/HCC)        Relevant Orders    Insulin, Random    Testosterone,Free and Total    Visual changes              Current Outpatient Medications   Medication Sig Dispense Refill    allopurinol (Zyloprim) 300 mg tablet Take 1 tablet (300 mg) by mouth once daily. 90 tablet 0    BD Ultra-Fine Micro Pen Needle 32 gauge x 1/4\" needle       DULoxetine (Cymbalta) 60 mg DR capsule Take 1 capsule (60 mg) by mouth once daily. Do not crush or chew. 90 capsule 0    flash glucose sensor (FREESTYLE AMY 14 DAY SENSOR [facility] Use as directed every 2 weeks      gabapentin (Neurontin) 600 mg tablet Take 1 tablet (600 mg) by mouth 3 times a day.      metoprolol tartrate (Lopressor) 100 mg tablet Take 1 tablet (100 mg) by mouth 3 times a day.      pantoprazole (ProtoNix) 40 mg EC tablet Take 1 tablet (40 mg) by mouth 2 times a day.      simvastatin (Zocor) 10 mg tablet TAKE ONE TABLET BY MOUTH ONE TIME DAILY 90 tablet 0    tirzepatide (Mounjaro) 12.5 mg/0.5 mL pen injector Inject 12.5 mg under the skin 1 (one) time per week. 6 mL 1    valsartan-hydrochlorothiazide (Diovan-HCT) 320-25 mg tablet Take 1 tablet by mouth once daily.      Xigduo XR 5-1,000 mg Take 1 tablet by mouth 2 times a day.      NovoLOG FlexPen U-100 Insulin 100 unit/mL (3 mL) pen Inject 36 Units under the skin in the morning and 36 Units at noon and 36 Units in the evening. Inject with meals. 100 mL 0    Tresiba FlexTouch U-200 200 unit/mL (3 mL) injection Inject 60 Units under the skin in the morning and 60 Units before bedtime. 60 mL 1     No current facility-administered medications for this visit.     Patient ID: Reza Red is a 62 y.o. male.    Ear Cerumen " Removal    Date/Time: 8/10/2023 11:13 AM    Performed by: Alis Jones MD  Authorized by: Alis Jones MD    Consent:     Consent obtained:  Verbal    Consent given by:  Patient    Risks, benefits, and alternatives were discussed: yes      Risks discussed:  Bleeding, infection, incomplete removal, dizziness, TM perforation and pain    Alternatives discussed:  Delayed treatment, alternative treatment, observation, referral and no treatment  Universal protocol:     Procedure explained and questions answered to patient or proxy's satisfaction: yes      Patient identity confirmed:  Verbally with patient  Procedure details:     Location:  L ear and R ear    Procedure type: irrigation      Procedure outcomes: cerumen removed    Post-procedure details:     Inspection:  Ear canal clear, no bleeding and TM intact    Hearing quality:  Normal    Procedure completion:  Tolerated well, no immediate complications      F/U in 3 months for DM and htn and headaches  Call if headaches do not improve with Zyrtec daily

## 2023-08-11 LAB
C REACTIVE PROTEIN (MG/L) IN SER/PLAS: 1.26 MG/DL
ESTIMATED AVERAGE GLUCOSE FOR HBA1C: 278 MG/DL
HEMOGLOBIN A1C/HEMOGLOBIN TOTAL IN BLOOD: 11.3 %
HEPATITIS C VIRUS AB PRESENCE IN SERUM: NONREACTIVE
HIV 1/ 2 AG/AB SCREEN: NONREACTIVE
INSULIN: 22 UIU/ML (ref 3–25)
MAGNESIUM (MG/DL) IN SER/PLAS: 2.09 MG/DL (ref 1.6–2.4)
PROSTATE SPECIFIC ANTIGEN,SCREEN: 0.58 NG/ML (ref 0–4)
SEDIMENTATION RATE, ERYTHROCYTE: 46 MM/H (ref 0–20)
THYROTROPIN (MIU/L) IN SER/PLAS BY DETECTION LIMIT <= 0.05 MIU/L: 1.15 MIU/L (ref 0.44–3.98)
URATE (MG/DL) IN SER/PLAS: 7.4 MG/DL (ref 4–7.5)

## 2023-08-16 ENCOUNTER — PATIENT OUTREACH (OUTPATIENT)
Dept: PRIMARY CARE | Facility: CLINIC | Age: 63
End: 2023-08-16
Payer: COMMERCIAL

## 2023-08-16 DIAGNOSIS — Z79.4 TYPE 2 DIABETES MELLITUS WITH DIABETIC NEUROPATHY, WITH LONG-TERM CURRENT USE OF INSULIN (MULTI): ICD-10-CM

## 2023-08-16 DIAGNOSIS — E11.40 TYPE 2 DIABETES MELLITUS WITH DIABETIC NEUROPATHY, WITH LONG-TERM CURRENT USE OF INSULIN (MULTI): ICD-10-CM

## 2023-08-16 LAB
TESTOSTERONE FREE (CHAN): 37.6 PG/ML (ref 35–155)
TESTOSTERONE,TOTAL,LC-MS/MS: 127 NG/DL (ref 250–1100)

## 2023-08-16 PROCEDURE — 99490 CHRNC CARE MGMT STAFF 1ST 20: CPT | Performed by: FAMILY MEDICINE

## 2023-08-16 RX ORDER — TIRZEPATIDE 12.5 MG/.5ML
12.5 INJECTION, SOLUTION SUBCUTANEOUS
Qty: 6 ML | Refills: 1 | Status: SHIPPED | OUTPATIENT
Start: 2023-08-16 | End: 2023-08-16

## 2023-08-16 NOTE — PROGRESS NOTES
Patient calls me asking for a refill of his Mounjaro from the  pharmacy he normally gets this from. Rx pended to PCP.     We talked about his recent visit to the office. How his A1C went up to 11.3 (on blood work and 12.8 in office. Unsure why he had this done twice). He states he was losing weight and feeling better so he stopped his insulin. He has started back on them. He is aware he should not stop his diabetes (or any medicine) unless directed to by a provider. We reviewed the dosages of his Tresiba and Humalog. He states he is being compliant now. He states he does have his Freestyle Scotty sensor on, but admits he is not good at scanning it. I emphasized the importance of scanning at least every 8 hours. If he does not scan, the readings will not be captured. He has to scan every 8 hours or more in order to get a good picture of what his glucose levels look like. He is being followed by our office through [facility]. I plan on watching this and reaching out to him next week to follow up on this very closely.

## 2023-08-23 ENCOUNTER — PATIENT OUTREACH (OUTPATIENT)
Dept: PRIMARY CARE | Facility: CLINIC | Age: 63
End: 2023-08-23
Payer: COMMERCIAL

## 2023-08-23 DIAGNOSIS — Z79.4 TYPE 2 DIABETES MELLITUS WITH DIABETIC NEUROPATHY, WITH LONG-TERM CURRENT USE OF INSULIN (MULTI): ICD-10-CM

## 2023-08-23 DIAGNOSIS — E11.40 TYPE 2 DIABETES MELLITUS WITH DIABETIC NEUROPATHY, WITH LONG-TERM CURRENT USE OF INSULIN (MULTI): ICD-10-CM

## 2023-08-23 DIAGNOSIS — E66.01 MORBID OBESITY WITH BODY MASS INDEX (BMI) OF 40.0 OR HIGHER (MULTI): ICD-10-CM

## 2023-08-23 NOTE — PROGRESS NOTES
Chart review completed. CCM outreach attempted. No answer. I left a message letting patient know I want to talk about his sugar levels. I went to our [employer] and there are no readings in the last week. He either is not scanning or not uploading the information. And I would like to talk to him about this further.

## 2023-08-28 ENCOUNTER — PATIENT OUTREACH (OUTPATIENT)
Dept: PRIMARY CARE | Facility: CLINIC | Age: 63
End: 2023-08-28
Payer: COMMERCIAL

## 2023-08-28 DIAGNOSIS — E11.40 TYPE 2 DIABETES MELLITUS WITH DIABETIC NEUROPATHY, WITH LONG-TERM CURRENT USE OF INSULIN (MULTI): ICD-10-CM

## 2023-08-28 DIAGNOSIS — Z79.4 TYPE 2 DIABETES MELLITUS WITH DIABETIC NEUROPATHY, WITH LONG-TERM CURRENT USE OF INSULIN (MULTI): ICD-10-CM

## 2023-08-28 NOTE — PROGRESS NOTES
Call placed to patient. No information on [employer] for patient regarding his glucose readings. I called him to see what is going on. He states he keeps forgetting to scan. But then says that there is no way his sensor could be correct because it says his levels are in the 400's. He states there is no way because he isn't up urinating all night long. I tried to explain that this is not the only symptom he could have. His A1C was elevated, so he very well could have these levels. He is going to put on another sensor and promises to scan it. He is aware I am calling him back next week and I am expecting to see some type of information. He does state he is compliant with his diabetic medications. He states he appreciates me caring about him.

## 2023-09-06 ENCOUNTER — PATIENT OUTREACH (OUTPATIENT)
Dept: PRIMARY CARE | Facility: CLINIC | Age: 63
End: 2023-09-06
Payer: COMMERCIAL

## 2023-09-06 DIAGNOSIS — Z79.4 TYPE 2 DIABETES MELLITUS WITH DIABETIC NEUROPATHY, WITH LONG-TERM CURRENT USE OF INSULIN (MULTI): ICD-10-CM

## 2023-09-06 DIAGNOSIS — E66.01 MORBID OBESITY WITH BODY MASS INDEX (BMI) OF 40.0 OR HIGHER (MULTI): ICD-10-CM

## 2023-09-06 DIAGNOSIS — E11.40 TYPE 2 DIABETES MELLITUS WITH DIABETIC NEUROPATHY, WITH LONG-TERM CURRENT USE OF INSULIN (MULTI): ICD-10-CM

## 2023-09-13 ENCOUNTER — PATIENT OUTREACH (OUTPATIENT)
Dept: PRIMARY CARE | Facility: CLINIC | Age: 63
End: 2023-09-13
Payer: COMMERCIAL

## 2023-09-13 DIAGNOSIS — E11.40 TYPE 2 DIABETES MELLITUS WITH DIABETIC NEUROPATHY, WITH LONG-TERM CURRENT USE OF INSULIN (MULTI): ICD-10-CM

## 2023-09-13 DIAGNOSIS — Z79.4 TYPE 2 DIABETES MELLITUS WITH DIABETIC NEUROPATHY, WITH LONG-TERM CURRENT USE OF INSULIN (MULTI): ICD-10-CM

## 2023-09-13 DIAGNOSIS — R51.9 ACUTE NONINTRACTABLE HEADACHE, UNSPECIFIED HEADACHE TYPE: ICD-10-CM

## 2023-09-13 DIAGNOSIS — E66.01 MORBID OBESITY WITH BODY MASS INDEX (BMI) OF 40.0 OR HIGHER (MULTI): ICD-10-CM

## 2023-09-13 DIAGNOSIS — I10 HYPERTENSION, UNSPECIFIED TYPE: ICD-10-CM

## 2023-09-13 LAB — NONINV COLON CA DNA+OCC BLD SCRN STL QL: POSITIVE

## 2023-09-13 NOTE — PROGRESS NOTES
[facility] call to patient. Patient is not scanning his CGM throughout the day like he is supposed to. He has checked his glucose level 5 times total in the last 14 days. I have encouraged him several times to be checking this at least every 8 hours in order to get a continuous amount of data. Patient states he forgets because he is in so much pain. I asked him about this further and he states he has had this pain behind his right eye everyday for about 3 months. He also states that he has no idea why his sugar levels are so high. He denies eating any sweets and states he has 3 small meals daily. He states he eats very little carbs. He is compliant with his insulin dosages.    I discussed with PCP and she suggested adding Farxiga 10mg daily for his sugars. She also said he may need Endocrinology at this point unless Jarret (pharmD) has any other suggestions. Also she said that he needs to go to an ophthalmologist regarding his eyes. And also should get an MRI brain w/o contrast for the pain.     I then discussed with Jarret as well. He states that the Farxiga would be helpful for kidney protection but he does not expect it to lower his glucose levels significantly. He suggested Endo is probably the next best step because he may need U-500 insulin.

## 2023-09-15 ENCOUNTER — PATIENT OUTREACH (OUTPATIENT)
Dept: PRIMARY CARE | Facility: CLINIC | Age: 63
End: 2023-09-15
Payer: COMMERCIAL

## 2023-09-15 DIAGNOSIS — G44.52 NEW PERSISTENT DAILY HEADACHE: ICD-10-CM

## 2023-09-15 DIAGNOSIS — Z79.4 TYPE 2 DIABETES MELLITUS WITH DIABETIC NEUROPATHY, WITH LONG-TERM CURRENT USE OF INSULIN (MULTI): ICD-10-CM

## 2023-09-15 DIAGNOSIS — E11.40 TYPE 2 DIABETES MELLITUS WITH DIABETIC NEUROPATHY, WITH LONG-TERM CURRENT USE OF INSULIN (MULTI): ICD-10-CM

## 2023-09-15 NOTE — PROGRESS NOTES
I called patient back to discuss several issues.     1.) In regards to his glucose levels, PCP said we could start Farxiga 10mg daily. Patient agreeable. Rx pended to go to [facility]. He is aware per Jarret, this will probably not make a noticeable difference in his glucose levels or A1C. Next step would need to be Endocrinology. Patient wants to give this a try first.     2.) Patient aware that next step for the daily pain behind his eye would be an MRI Brain. He states he just started taking the zyrtec daily as of a few days ago. He thinks he is starting to notice a difference. States he cannot afford the MRI right now, so he would like to give this a try.     3.) Patient notified Cologuard test came back positive. He is aware he needs to see GI. He states he has not seen one before, so we would need a recommendation. I will need to give his wife the name and number and fax the information to them. She needs to coordinate this appt around her work schedule. I am concerned patient is not going to follow through with the colonoscopy due to cost, so I explained why this follow up is so important.     4.) Patient asked for a new script for his Mounjaro. I called [facility] and they told me they have several refills for this. Then can just put him on automatic fill so he doesn't call us to send another script.

## 2023-09-16 RX ORDER — DAPAGLIFLOZIN 10 MG/1
10 TABLET, FILM COATED ORAL DAILY
Qty: 90 TABLET | Refills: 1 | Status: SHIPPED | OUTPATIENT
Start: 2023-09-16 | End: 2024-01-17 | Stop reason: SDUPTHER

## 2023-09-20 ENCOUNTER — PATIENT OUTREACH (OUTPATIENT)
Dept: PRIMARY CARE | Facility: CLINIC | Age: 63
End: 2023-09-20
Payer: COMMERCIAL

## 2023-09-20 DIAGNOSIS — R19.5 POSITIVE COLORECTAL CANCER SCREENING USING COLOGUARD TEST: ICD-10-CM

## 2023-09-20 DIAGNOSIS — Z79.4 TYPE 2 DIABETES MELLITUS WITH DIABETIC NEUROPATHY, WITH LONG-TERM CURRENT USE OF INSULIN (MULTI): ICD-10-CM

## 2023-09-20 DIAGNOSIS — I10 HYPERTENSION, UNSPECIFIED TYPE: ICD-10-CM

## 2023-09-20 DIAGNOSIS — E11.40 TYPE 2 DIABETES MELLITUS WITH DIABETIC NEUROPATHY, WITH LONG-TERM CURRENT USE OF INSULIN (MULTI): ICD-10-CM

## 2023-09-20 NOTE — PROGRESS NOTES
Call placed to patient. I wanted to give him the number for Dr. Villarreal's office. He said that nap time is from 2-4 so I would need to call back. Meanwhile, I will fax the referral information to their office.

## 2023-09-21 ENCOUNTER — PATIENT OUTREACH (OUTPATIENT)
Dept: PRIMARY CARE | Facility: CLINIC | Age: 63
End: 2023-09-21
Payer: COMMERCIAL

## 2023-09-21 DIAGNOSIS — E11.40 TYPE 2 DIABETES MELLITUS WITH DIABETIC NEUROPATHY, WITH LONG-TERM CURRENT USE OF INSULIN (MULTI): ICD-10-CM

## 2023-09-21 DIAGNOSIS — R19.5 POSITIVE COLORECTAL CANCER SCREENING USING COLOGUARD TEST: ICD-10-CM

## 2023-09-21 DIAGNOSIS — Z79.4 TYPE 2 DIABETES MELLITUS WITH DIABETIC NEUROPATHY, WITH LONG-TERM CURRENT USE OF INSULIN (MULTI): ICD-10-CM

## 2023-09-21 NOTE — PROGRESS NOTES
Call placed to patient. I have reviewed his Libreview and I see that his glucose levels yesterday are much improved. He was in the 100's compared to 200's and 300's just days before. He states that he did indeed start the Farxiga and has noticed a big difference. He states that his vision has changed somewhat which he states usually happens when his sugars come down. It takes a few days for his eyes to adjust. I encouraged him to see the ophthalmologist for his diabetic eye exam. He states he is going to do that during the first sandra quarter. He alluded to the fact that his erectile dysfunction is also better at this time. He was encouraged to continue on this medication and scan his Scotty as often as he can so we can get a full picture of his glucose levels. I also told him that I have the name of the GI doctor recommended by Dr. Jones. He asked that I email this information to his wife Laura, which I have done. He is aware that I have already faxed the information there as well, so they may call him.

## 2023-09-25 DIAGNOSIS — Z79.4 TYPE 2 DIABETES MELLITUS WITH DIABETIC NEUROPATHY, WITH LONG-TERM CURRENT USE OF INSULIN (MULTI): Primary | ICD-10-CM

## 2023-09-25 DIAGNOSIS — E11.40 TYPE 2 DIABETES MELLITUS WITH DIABETIC NEUROPATHY, WITH LONG-TERM CURRENT USE OF INSULIN (MULTI): Primary | ICD-10-CM

## 2023-09-26 RX ORDER — PEN NEEDLE, DIABETIC 32 GX 1/4"
NEEDLE, DISPOSABLE MISCELLANEOUS
Qty: 500 EACH | Refills: 0 | Status: SHIPPED | OUTPATIENT
Start: 2023-09-26

## 2023-11-09 ENCOUNTER — OFFICE VISIT (OUTPATIENT)
Dept: PRIMARY CARE | Facility: CLINIC | Age: 63
End: 2023-11-09
Payer: COMMERCIAL

## 2023-11-09 VITALS
TEMPERATURE: 98.4 F | SYSTOLIC BLOOD PRESSURE: 140 MMHG | OXYGEN SATURATION: 95 % | WEIGHT: 315 LBS | BODY MASS INDEX: 45.49 KG/M2 | DIASTOLIC BLOOD PRESSURE: 74 MMHG | HEART RATE: 104 BPM

## 2023-11-09 DIAGNOSIS — R05.1 ACUTE COUGH: ICD-10-CM

## 2023-11-09 DIAGNOSIS — R09.81 SINUS CONGESTION: Primary | ICD-10-CM

## 2023-11-09 PROCEDURE — 1036F TOBACCO NON-USER: CPT | Performed by: FAMILY MEDICINE

## 2023-11-09 PROCEDURE — 99214 OFFICE O/P EST MOD 30 MIN: CPT | Performed by: FAMILY MEDICINE

## 2023-11-09 PROCEDURE — 3077F SYST BP >= 140 MM HG: CPT | Performed by: FAMILY MEDICINE

## 2023-11-09 PROCEDURE — 87637 SARSCOV2&INF A&B&RSV AMP PRB: CPT

## 2023-11-09 PROCEDURE — 2028F FOOT EXAM PERFORMED: CPT | Performed by: FAMILY MEDICINE

## 2023-11-09 PROCEDURE — 3046F HEMOGLOBIN A1C LEVEL >9.0%: CPT | Performed by: FAMILY MEDICINE

## 2023-11-09 PROCEDURE — 3078F DIAST BP <80 MM HG: CPT | Performed by: FAMILY MEDICINE

## 2023-11-09 RX ORDER — METHYLPREDNISOLONE 4 MG/1
TABLET ORAL
Qty: 21 TABLET | Refills: 0 | Status: SHIPPED | OUTPATIENT
Start: 2023-11-09 | End: 2023-11-16

## 2023-11-09 RX ORDER — AMOXICILLIN AND CLAVULANATE POTASSIUM 600; 42.9 MG/5ML; MG/5ML
POWDER, FOR SUSPENSION ORAL
Qty: 200 ML | Refills: 0 | Status: SHIPPED
Start: 2023-11-09 | End: 2024-01-17 | Stop reason: ALTCHOICE

## 2023-11-09 ASSESSMENT — ENCOUNTER SYMPTOMS
SINUS PAIN: 0
FACIAL SWELLING: 0
ADENOPATHY: 0
CHEST TIGHTNESS: 0
BACK PAIN: 0
COUGH: 1
ACTIVITY CHANGE: 0
DIAPHORESIS: 0
CONSTIPATION: 0
DIZZINESS: 0
EYE REDNESS: 0
UNEXPECTED WEIGHT CHANGE: 0
NAUSEA: 0
ARTHRALGIAS: 0
APPETITE CHANGE: 0
SINUS PRESSURE: 1
WOUND: 0
FATIGUE: 0
VOICE CHANGE: 0
TROUBLE SWALLOWING: 0
COLOR CHANGE: 0
VOMITING: 0
EYE ITCHING: 0
SHORTNESS OF BREATH: 1
NERVOUS/ANXIOUS: 0
HEMATURIA: 0
JOINT SWELLING: 0
DIARRHEA: 0
EYE PAIN: 0
POLYDIPSIA: 0
BLOOD IN STOOL: 0
POLYPHAGIA: 0
AGITATION: 0
CHILLS: 0
FEVER: 0
SLEEP DISTURBANCE: 0
RHINORRHEA: 1
ABDOMINAL PAIN: 0
MYALGIAS: 0
FLANK PAIN: 0
PALPITATIONS: 0
SORE THROAT: 1
WHEEZING: 0
DYSURIA: 0
FREQUENCY: 0
BRUISES/BLEEDS EASILY: 0
EYE DISCHARGE: 0
NECK STIFFNESS: 0

## 2023-11-09 NOTE — PROGRESS NOTES
Subjective   Patient ID: Reza Red is a 63 y.o. male who presents for Follow-up and URI (3 days. No COVID test).  Today he is accompanied by accompanied by spouse.     URI   Associated symptoms include congestion, coughing, rhinorrhea, sneezing and a sore throat. Pertinent negatives include no abdominal pain, chest pain, diarrhea, dysuria, ear pain, nausea, rash, sinus pain, vomiting or wheezing.     The patient has been sick for the past 10 days. The patient's kid is sick as well. He has similar symptoms as his partner and child. Experiencing coughing, sore throat, myalgias, poor PO intake, and occasional SOB. He has pain with swallowing due to his sore throat and reports he has hard time swallowing his pills. He has used Dayquil, theraflu, and other OTC cold medications with mild relief.     Review of Systems   Constitutional:  Negative for activity change, appetite change, chills, diaphoresis, fatigue, fever and unexpected weight change.   HENT:  Positive for congestion, postnasal drip, rhinorrhea, sinus pressure, sneezing and sore throat. Negative for dental problem, drooling, ear discharge, ear pain, facial swelling, hearing loss, nosebleeds, sinus pain, tinnitus, trouble swallowing and voice change.    Eyes:  Negative for pain, discharge, redness, itching and visual disturbance.   Respiratory:  Positive for cough and shortness of breath. Negative for chest tightness and wheezing.    Cardiovascular:  Negative for chest pain, palpitations and leg swelling.   Gastrointestinal:  Negative for abdominal pain, blood in stool, constipation, diarrhea, nausea and vomiting.   Endocrine: Negative for cold intolerance, heat intolerance, polydipsia, polyphagia and polyuria.   Genitourinary:  Negative for decreased urine volume, dysuria, flank pain, frequency, hematuria and urgency.   Musculoskeletal:  Negative for arthralgias, back pain, gait problem, joint swelling, myalgias and neck stiffness.   Skin:  Negative for  color change, pallor, rash and wound.   Neurological:  Negative for dizziness.   Hematological:  Negative for adenopathy. Does not bruise/bleed easily.   Psychiatric/Behavioral:  Negative for agitation, behavioral problems and sleep disturbance. The patient is not nervous/anxious.        Objective   Blood Pressure 140/74   Pulse 104   Temperature 36.9 °C (98.4 °F) (Temporal)   Weight (Abnormal) 152 kg (335 lb 6 oz)   Oxygen Saturation 95%   Body Mass Index 45.49 kg/m²   BSA: 2.78 meters squared  Growth percentiles: Facility age limit for growth %chandrakant is 20 years. Facility age limit for growth %chandrakant is 20 years.     Physical Exam  Constitutional:       General: He is not in acute distress.     Appearance: Normal appearance. He is obese. He is ill-appearing. He is not toxic-appearing or diaphoretic.   HENT:      Head: Normocephalic and atraumatic.      Right Ear: Tympanic membrane, ear canal and external ear normal.      Left Ear: Tympanic membrane, ear canal and external ear normal.      Nose: Congestion present.      Mouth/Throat:      Mouth: Mucous membranes are moist.      Pharynx: Oropharynx is clear. Posterior oropharyngeal erythema present.   Eyes:      Extraocular Movements: Extraocular movements intact.      Conjunctiva/sclera: Conjunctivae normal.      Pupils: Pupils are equal, round, and reactive to light.   Neck:      Comments: No cervical lymphadenopathy. Tonsil examination limited due to body habitus.  Cardiovascular:      Rate and Rhythm: Normal rate and regular rhythm.   Pulmonary:      Effort: Pulmonary effort is normal.      Breath sounds: Normal breath sounds. No wheezing.   Musculoskeletal:      Cervical back: Normal range of motion and neck supple.   Lymphadenopathy:      Cervical: No cervical adenopathy.   Neurological:      Mental Status: He is alert.         Assessment/Plan   Problem List Items Addressed This Visit    None  Visit Diagnoses       Diagnosis Codes    Sinus congestion    -   Primary R09.81    Relevant Medications    methylPREDNISolone (Medrol Dospak) 4 mg tablets    amoxicillin-pot clavulanate (Augmentin) 600-42.9 mg/5 mL suspension    Other Relevant Orders    RSV PCR    Sars-CoV-2 PCR, Symptomatic    Influenza A, and B PCR    Acute cough     R05.1    Relevant Medications    methylPREDNISolone (Medrol Dospak) 4 mg tablets    amoxicillin-pot clavulanate (Augmentin) 600-42.9 mg/5 mL suspension    Other Relevant Orders    RSV PCR    Sars-CoV-2 PCR, Symptomatic    Influenza A, and B PCR          Start antibiotics  Call if any worse or if no improvement in 3-5 days  Increase fluids  OTC decongestants as needed  Saline and OTC flonase as needed    Viral swab has been obtained   F/U in January for DM or sooner if any concerns

## 2023-11-09 NOTE — PATIENT INSTRUCTIONS
"Start antibiotics  Call if any worse or if no improvement in 3-5 days  Increase fluids  OTC decongestants as needed  Saline and OTC flonase as needed    Viral swab has been obtained   F/U in January for DM or sooner if any concerns    Current Outpatient Medications   Medication Sig Dispense Refill    allopurinol (Zyloprim) 300 mg tablet Take 1 tablet (300 mg) by mouth once daily. 90 tablet 0    dapagliflozin propanediol (Farxiga) 10 mg Take 1 tablet (10 mg) by mouth once daily. 90 tablet 1    DULoxetine (Cymbalta) 60 mg DR capsule Take 1 capsule (60 mg) by mouth once daily. Do not crush or chew. 90 capsule 0    flash glucose sensor (FREESTYLE AMY 14 DAY SENSOR [facility]) Use as directed every 2 weeks      metoprolol tartrate (Lopressor) 100 mg tablet Take 1 tablet (100 mg) by mouth 3 times a day.      pantoprazole (ProtoNix) 40 mg EC tablet Take 1 tablet (40 mg) by mouth 2 times a day.      pen needle, diabetic (BD Ultra-Fine Micro Pen Needle) 32 gauge x 1/4\" needle USE 1 NEEDLE 5 TIMES A DAY WITH NOVOLOG AND TRESIBA 500 each 0    simvastatin (Zocor) 10 mg tablet TAKE ONE TABLET BY MOUTH ONE TIME DAILY 90 tablet 0    tirzepatide 12.5 mg/0.5 mL pen injector INJECT 12.5 MG UNDER THE SKIN 1 (ONE) TIME PER WEEK. 6 mL 1    valsartan-hydrochlorothiazide (Diovan-HCT) 320-25 mg tablet Take 1 tablet by mouth once daily.      Xigduo XR 5-1,000 mg Take 1 tablet by mouth 2 times a day.      amoxicillin-pot clavulanate (Augmentin) 600-42.9 mg/5 mL suspension 10 mL PO twice a day for 10 days 200 mL 0    cetirizine (ZYRTEC) 10 mg capsule Take 1 capsule (10 mg) by mouth once daily.      gabapentin (Neurontin) 600 mg tablet Take 1 tablet (600 mg) by mouth 3 times a day.      methylPREDNISolone (Medrol Dospak) 4 mg tablets Take as directed on package. 21 tablet 0    NovoLOG FlexPen U-100 Insulin 100 unit/mL (3 mL) pen Inject 36 Units under the skin in the morning and 36 Units at noon and 36 Units in the evening. Inject with meals. 100 " mL 0    Tresiba FlexTouch U-200 200 unit/mL (3 mL) injection Inject 60 Units under the skin in the morning and 60 Units before bedtime. 60 mL 1     No current facility-administered medications for this visit.

## 2023-11-10 LAB
FLUAV RNA RESP QL NAA+PROBE: NOT DETECTED
FLUBV RNA RESP QL NAA+PROBE: NOT DETECTED
RSV RNA RESP QL NAA+PROBE: NOT DETECTED
SARS-COV-2 RNA RESP QL NAA+PROBE: NOT DETECTED

## 2023-11-13 ENCOUNTER — APPOINTMENT (OUTPATIENT)
Dept: PRIMARY CARE | Facility: CLINIC | Age: 63
End: 2023-11-13
Payer: COMMERCIAL

## 2023-11-30 ENCOUNTER — PATIENT OUTREACH (OUTPATIENT)
Dept: PRIMARY CARE | Facility: CLINIC | Age: 63
End: 2023-11-30
Payer: COMMERCIAL

## 2023-11-30 DIAGNOSIS — Z79.4 TYPE 2 DIABETES MELLITUS WITH DIABETIC NEUROPATHY, WITH LONG-TERM CURRENT USE OF INSULIN (MULTI): ICD-10-CM

## 2023-11-30 DIAGNOSIS — E11.40 TYPE 2 DIABETES MELLITUS WITH DIABETIC NEUROPATHY, WITH LONG-TERM CURRENT USE OF INSULIN (MULTI): ICD-10-CM

## 2023-11-30 DIAGNOSIS — I10 HYPERTENSION, UNSPECIFIED TYPE: ICD-10-CM

## 2023-11-30 DIAGNOSIS — R19.5 POSITIVE COLORECTAL CANCER SCREENING USING COLOGUARD TEST: ICD-10-CM

## 2023-11-30 NOTE — PROGRESS NOTES
Chart review completed in preparation for patient outreach call. Call placed to Dr. Villarreal's office to see if they contacted patient about an appt. He had a positive cologuard. I spoke to someone who said they have sent a text, call and letter and they have not received a call back to schedule.   Call placed to patient. Left message to call back.

## 2023-12-11 ENCOUNTER — PATIENT OUTREACH (OUTPATIENT)
Dept: PRIMARY CARE | Facility: CLINIC | Age: 63
End: 2023-12-11
Payer: COMMERCIAL

## 2023-12-11 DIAGNOSIS — Z79.4 TYPE 2 DIABETES MELLITUS WITH DIABETIC NEUROPATHY, WITH LONG-TERM CURRENT USE OF INSULIN (MULTI): ICD-10-CM

## 2023-12-11 DIAGNOSIS — E11.40 TYPE 2 DIABETES MELLITUS WITH DIABETIC NEUROPATHY, WITH LONG-TERM CURRENT USE OF INSULIN (MULTI): ICD-10-CM

## 2023-12-11 DIAGNOSIS — I10 HYPERTENSION, UNSPECIFIED TYPE: ICD-10-CM

## 2023-12-11 DIAGNOSIS — R19.5 POSITIVE COLORECTAL CANCER SCREENING USING COLOGUARD TEST: ICD-10-CM

## 2023-12-11 NOTE — PROGRESS NOTES
Chart review completed prior to CCM outreach. Call completed to patient. No answer. LMTCB on cell voicemail.

## 2023-12-21 ENCOUNTER — PATIENT OUTREACH (OUTPATIENT)
Dept: PRIMARY CARE | Facility: CLINIC | Age: 63
End: 2023-12-21
Payer: COMMERCIAL

## 2023-12-21 DIAGNOSIS — E11.40 TYPE 2 DIABETES MELLITUS WITH DIABETIC NEUROPATHY, WITH LONG-TERM CURRENT USE OF INSULIN (MULTI): ICD-10-CM

## 2023-12-21 DIAGNOSIS — I10 HYPERTENSION, UNSPECIFIED TYPE: ICD-10-CM

## 2023-12-21 DIAGNOSIS — R19.5 POSITIVE COLORECTAL CANCER SCREENING USING COLOGUARD TEST: ICD-10-CM

## 2023-12-21 DIAGNOSIS — Z79.4 TYPE 2 DIABETES MELLITUS WITH DIABETIC NEUROPATHY, WITH LONG-TERM CURRENT USE OF INSULIN (MULTI): ICD-10-CM

## 2023-12-21 DIAGNOSIS — E66.01 MORBID OBESITY WITH BODY MASS INDEX (BMI) OF 40.0 OR HIGHER (MULTI): ICD-10-CM

## 2023-12-21 PROCEDURE — 99490 CHRNC CARE MGMT STAFF 1ST 20: CPT | Performed by: FAMILY MEDICINE

## 2023-12-21 NOTE — PROGRESS NOTES
Call placed to patient again. He has not returned my calls. I called his wife and spoke to her. She states that she is concerned about the fact that she has not picked up any of his medicines for a month, which indicates he is not being compliant. He has not made an appt with GI doctor. She states her scheduled should be easier for her to get dates off in January. She will need some [facility] paperwork as well completed for the year. I asked her to please let patient know I would like to talk to him. He should also keep his appt in January which wife says he will because she comes that day too.

## 2024-01-15 DIAGNOSIS — E11.40 TYPE 2 DIABETES MELLITUS WITH DIABETIC NEUROPATHY, WITH LONG-TERM CURRENT USE OF INSULIN (MULTI): ICD-10-CM

## 2024-01-15 DIAGNOSIS — Z79.4 TYPE 2 DIABETES MELLITUS WITH DIABETIC NEUROPATHY, WITH LONG-TERM CURRENT USE OF INSULIN (MULTI): ICD-10-CM

## 2024-01-15 RX ORDER — GABAPENTIN 600 MG/1
600 TABLET ORAL 3 TIMES DAILY
Qty: 270 TABLET | Refills: 0 | OUTPATIENT
Start: 2024-01-15

## 2024-01-17 ENCOUNTER — OFFICE VISIT (OUTPATIENT)
Dept: PRIMARY CARE | Facility: CLINIC | Age: 64
End: 2024-01-17
Payer: COMMERCIAL

## 2024-01-17 ENCOUNTER — LAB (OUTPATIENT)
Dept: LAB | Facility: LAB | Age: 64
End: 2024-01-17
Payer: COMMERCIAL

## 2024-01-17 VITALS
DIASTOLIC BLOOD PRESSURE: 72 MMHG | OXYGEN SATURATION: 92 % | SYSTOLIC BLOOD PRESSURE: 110 MMHG | BODY MASS INDEX: 44.37 KG/M2 | WEIGHT: 315 LBS | HEART RATE: 85 BPM

## 2024-01-17 DIAGNOSIS — E78.5 HYPERLIPIDEMIA ASSOCIATED WITH TYPE 2 DIABETES MELLITUS (MULTI): ICD-10-CM

## 2024-01-17 DIAGNOSIS — E66.01 MORBID OBESITY WITH BODY MASS INDEX (BMI) OF 40.0 OR HIGHER (MULTI): ICD-10-CM

## 2024-01-17 DIAGNOSIS — E11.69 HYPERLIPIDEMIA ASSOCIATED WITH TYPE 2 DIABETES MELLITUS (MULTI): ICD-10-CM

## 2024-01-17 DIAGNOSIS — E55.9 VITAMIN D DEFICIENCY: ICD-10-CM

## 2024-01-17 DIAGNOSIS — I10 ESSENTIAL (PRIMARY) HYPERTENSION: ICD-10-CM

## 2024-01-17 DIAGNOSIS — M1A.20X0 CHRONIC GOUT DUE TO DRUG WITHOUT TOPHUS, UNSPECIFIED SITE: ICD-10-CM

## 2024-01-17 DIAGNOSIS — F33.9 CHRONIC MAJOR DEPRESSIVE DISORDER, RECURRENT EPISODE (CMS-HCC): ICD-10-CM

## 2024-01-17 DIAGNOSIS — Z23 NEED FOR IMMUNIZATION AGAINST INFLUENZA: ICD-10-CM

## 2024-01-17 DIAGNOSIS — Z79.4 TYPE 2 DIABETES MELLITUS WITH HYPERGLYCEMIA, WITH LONG-TERM CURRENT USE OF INSULIN (MULTI): ICD-10-CM

## 2024-01-17 DIAGNOSIS — M54.50 CHRONIC BILATERAL LOW BACK PAIN WITHOUT SCIATICA: ICD-10-CM

## 2024-01-17 DIAGNOSIS — E11.65 TYPE 2 DIABETES MELLITUS WITH HYPERGLYCEMIA, WITH LONG-TERM CURRENT USE OF INSULIN (MULTI): ICD-10-CM

## 2024-01-17 DIAGNOSIS — E53.8 VITAMIN B 12 DEFICIENCY: ICD-10-CM

## 2024-01-17 DIAGNOSIS — Z23 NEED FOR PNEUMOCOCCAL 20-VALENT CONJUGATE VACCINATION: ICD-10-CM

## 2024-01-17 DIAGNOSIS — E11.65 TYPE 2 DIABETES MELLITUS WITH HYPERGLYCEMIA, WITH LONG-TERM CURRENT USE OF INSULIN (MULTI): Primary | ICD-10-CM

## 2024-01-17 DIAGNOSIS — E11.40 TYPE 2 DIABETES MELLITUS WITH DIABETIC NEUROPATHY, WITH LONG-TERM CURRENT USE OF INSULIN (MULTI): ICD-10-CM

## 2024-01-17 DIAGNOSIS — Z79.4 TYPE 2 DIABETES MELLITUS WITH DIABETIC NEUROPATHY, WITH LONG-TERM CURRENT USE OF INSULIN (MULTI): ICD-10-CM

## 2024-01-17 DIAGNOSIS — Z79.4 TYPE 2 DIABETES MELLITUS WITH HYPERGLYCEMIA, WITH LONG-TERM CURRENT USE OF INSULIN (MULTI): Primary | ICD-10-CM

## 2024-01-17 DIAGNOSIS — G89.29 CHRONIC BILATERAL LOW BACK PAIN WITHOUT SCIATICA: ICD-10-CM

## 2024-01-17 PROBLEM — M51.369 LUMBAR DEGENERATIVE DISC DISEASE: Status: ACTIVE | Noted: 2024-01-17

## 2024-01-17 PROBLEM — I47.10 SVT (SUPRAVENTRICULAR TACHYCARDIA) (CMS-HCC): Status: ACTIVE | Noted: 2024-01-17

## 2024-01-17 PROBLEM — M51.36 LUMBAR DEGENERATIVE DISC DISEASE: Status: ACTIVE | Noted: 2024-01-17

## 2024-01-17 LAB — POC HEMOGLOBIN A1C: 14 % (ref 4.2–6.5)

## 2024-01-17 PROCEDURE — 90677 PCV20 VACCINE IM: CPT | Performed by: FAMILY MEDICINE

## 2024-01-17 PROCEDURE — 80053 COMPREHEN METABOLIC PANEL: CPT

## 2024-01-17 PROCEDURE — 36415 COLL VENOUS BLD VENIPUNCTURE: CPT

## 2024-01-17 PROCEDURE — 83036 HEMOGLOBIN GLYCOSYLATED A1C: CPT | Performed by: FAMILY MEDICINE

## 2024-01-17 PROCEDURE — 84443 ASSAY THYROID STIM HORMONE: CPT

## 2024-01-17 PROCEDURE — 99215 OFFICE O/P EST HI 40 MIN: CPT | Performed by: FAMILY MEDICINE

## 2024-01-17 PROCEDURE — 84146 ASSAY OF PROLACTIN: CPT

## 2024-01-17 PROCEDURE — 3050F LDL-C >= 130 MG/DL: CPT | Performed by: FAMILY MEDICINE

## 2024-01-17 PROCEDURE — 3074F SYST BP LT 130 MM HG: CPT | Performed by: FAMILY MEDICINE

## 2024-01-17 PROCEDURE — 83735 ASSAY OF MAGNESIUM: CPT

## 2024-01-17 PROCEDURE — 90472 IMMUNIZATION ADMIN EACH ADD: CPT | Performed by: FAMILY MEDICINE

## 2024-01-17 PROCEDURE — 90471 IMMUNIZATION ADMIN: CPT | Performed by: FAMILY MEDICINE

## 2024-01-17 PROCEDURE — 84550 ASSAY OF BLOOD/URIC ACID: CPT

## 2024-01-17 PROCEDURE — 85025 COMPLETE CBC W/AUTO DIFF WBC: CPT

## 2024-01-17 PROCEDURE — 85652 RBC SED RATE AUTOMATED: CPT

## 2024-01-17 PROCEDURE — 90686 IIV4 VACC NO PRSV 0.5 ML IM: CPT | Performed by: FAMILY MEDICINE

## 2024-01-17 PROCEDURE — 1036F TOBACCO NON-USER: CPT | Performed by: FAMILY MEDICINE

## 2024-01-17 PROCEDURE — 3078F DIAST BP <80 MM HG: CPT | Performed by: FAMILY MEDICINE

## 2024-01-17 PROCEDURE — 82607 VITAMIN B-12: CPT

## 2024-01-17 PROCEDURE — 82306 VITAMIN D 25 HYDROXY: CPT

## 2024-01-17 PROCEDURE — 80061 LIPID PANEL: CPT

## 2024-01-17 PROCEDURE — 86140 C-REACTIVE PROTEIN: CPT

## 2024-01-17 RX ORDER — FLASH GLUCOSE SENSOR
KIT MISCELLANEOUS
Qty: 6 EACH | Refills: 1 | Status: SHIPPED
Start: 2024-01-17 | End: 2024-06-03 | Stop reason: ALTCHOICE

## 2024-01-17 RX ORDER — DAPAGLIFLOZIN AND METFORMIN HYDROCHLORIDE 10; 1000 MG/1; MG/1
1 TABLET, FILM COATED, EXTENDED RELEASE ORAL
Qty: 90 TABLET | Refills: 0 | Status: SHIPPED | OUTPATIENT
Start: 2024-01-17 | End: 2024-04-09

## 2024-01-17 RX ORDER — GABAPENTIN 600 MG/1
600 TABLET ORAL 2 TIMES DAILY
Qty: 180 TABLET | Refills: 0 | Status: SHIPPED | OUTPATIENT
Start: 2024-01-17 | End: 2024-04-09

## 2024-01-17 RX ORDER — DAPAGLIFLOZIN 10 MG/1
10 TABLET, FILM COATED ORAL DAILY
Qty: 90 TABLET | Refills: 1 | Status: SHIPPED
Start: 2024-01-17 | End: 2024-01-17 | Stop reason: ALTCHOICE

## 2024-01-17 RX ORDER — VALSARTAN AND HYDROCHLOROTHIAZIDE 320; 25 MG/1; MG/1
1 TABLET, FILM COATED ORAL DAILY
Qty: 90 TABLET | Refills: 1 | Status: SHIPPED
Start: 2024-01-17 | End: 2024-04-25 | Stop reason: DRUGHIGH

## 2024-01-17 RX ORDER — SIMVASTATIN 10 MG/1
10 TABLET, FILM COATED ORAL DAILY
Qty: 90 TABLET | Refills: 1 | Status: SHIPPED
Start: 2024-01-17 | End: 2024-01-24

## 2024-01-17 ASSESSMENT — ENCOUNTER SYMPTOMS
WEIGHT LOSS: 1
FATIGUE: 0
POLYDIPSIA: 0
VISUAL CHANGE: 0
WEAKNESS: 0
POLYPHAGIA: 0
BLURRED VISION: 0

## 2024-01-17 NOTE — PROGRESS NOTES
Per PCP, C-Peptide and Prolactin lab orders entered to be added on to today's collection sample. She states his BS was in the 400's in the office.

## 2024-01-17 NOTE — PROGRESS NOTES
Subjective   Patient ID: Reza Red is a 63 y.o. male who presents for Diabetes.  Today he is accompanied by accompanied by spouse and child.     Diabetes  He presents for his follow-up diabetic visit. He has type 2 diabetes mellitus. His disease course has been worsening. There are no hypoglycemic associated symptoms. Associated symptoms include weight loss. Pertinent negatives for diabetes include no blurred vision, no chest pain, no fatigue, no foot paresthesias, no foot ulcerations, no polydipsia, no polyphagia, no polyuria, no visual change and no weakness. There are no hypoglycemic complications. Symptoms are worsening. Diabetic complications include autonomic neuropathy. Risk factors for coronary artery disease include diabetes mellitus, dyslipidemia, hypertension, male sex, obesity, stress and sedentary lifestyle. Current diabetic treatment includes intensive insulin program and oral agent (dual therapy). He is compliant with treatment most of the time. His weight is increasing rapidly. He is following a generally healthy diet. When asked about meal planning, he reported none. He has not had a previous visit with a dietitian. He rarely participates in exercise. His breakfast blood glucose range is generally >200 mg/dl. His lunch blood glucose range is generally >200 mg/dl. His dinner blood glucose range is generally >200 mg/dl. His bedtime blood glucose range is generally >200 mg/dl. His overall blood glucose range is >200 mg/dl. An ACE inhibitor/angiotensin II receptor blocker is being taken. He does not see a podiatrist.Eye exam is current.       Review of Systems   Constitutional:  Positive for weight loss. Negative for fatigue.   Eyes:  Negative for blurred vision.   Cardiovascular:  Negative for chest pain.   Endocrine: Negative for polydipsia, polyphagia and polyuria.   Neurological:  Negative for weakness.   All other systems reviewed and are negative.      Objective   Blood Pressure 110/72   Pulse  85   Weight 148 kg (327 lb 2 oz)   Oxygen Saturation 92%   Body Mass Index 44.37 kg/m²   BSA: 2.74 meters squared  Growth percentiles: Facility age limit for growth %chandrakant is 20 years. Facility age limit for growth %chandrakant is 20 years.     Physical Exam  Vitals and nursing note reviewed.   Constitutional:       Appearance: Normal appearance.   HENT:      Head: Normocephalic.      Right Ear: Tympanic membrane normal.      Left Ear: Tympanic membrane normal.   Cardiovascular:      Rate and Rhythm: Normal rate and regular rhythm.      Pulses: Normal pulses.      Heart sounds: Normal heart sounds.   Abdominal:      General: Abdomen is flat. Bowel sounds are normal.   Musculoskeletal:         General: Normal range of motion.   Neurological:      Mental Status: He is alert.   Psychiatric:         Mood and Affect: Mood normal.         Behavior: Behavior normal.         Assessment/Plan   Problem List Items Addressed This Visit             ICD-10-CM    Chronic bilateral low back pain without sciatica M54.50, G89.29    Gout M10.9    Relevant Orders    Sedimentation Rate    C-Reactive Protein    Uric Acid    Magnesium    Essential (primary) hypertension I10    Relevant Medications    valsartan-hydrochlorothiazide (Diovan-HCT) 320-25 mg tablet    Other Relevant Orders    Magnesium    Hyperlipidemia associated with type 2 diabetes mellitus (CMS/[facility]) E11.69, E78.5    Relevant Orders    TSH with reflex to Free T4 if abnormal    Morbid obesity with body mass index (BMI) of 40.0 or higher (CMS/HCC) E66.01    Relevant Orders    TSH with reflex to Free T4 if abnormal    Vitamin D deficiency E55.9    Relevant Orders    Vitamin D 25-Hydroxy,Total (for eval of Vitamin D levels)    Chronic major depressive disorder, recurrent episode (CMS/HCC) F33.9    Relevant Medications    simvastatin (Zocor) 10 mg tablet    Other Relevant Orders    TSH with reflex to Free T4 if abnormal    Diabetes mellitus with hyperglycemia, with long-term current  "use of insulin (CMS/[facility]) - Primary E11.65, Z79.4    Relevant Medications    dapaglifloz propaned-metformin (Xigduo XR) 10-1,000 mg    Other Relevant Orders    Lipid Panel    CBC and Auto Differential    Comprehensive Metabolic Panel    Magnesium    Type 2 diabetes mellitus with diabetic neuropathy, with long-term current use of insulin (CMS/[facility]) E11.40, Z79.4    Relevant Medications    tirzepatide 12.5 mg/0.5 mL pen injector    gabapentin (Neurontin) 600 mg tablet    FreeStyle Scotty sensor system (FreeStyle Scotty 14 Day Sensor) kit    Other Relevant Orders    POCT glycosylated hemoglobin (Hb A1C) manually resulted (Completed)     Other Visit Diagnoses       Diagnosis Codes    Need for immunization against influenza     Z23    Relevant Orders    Flu vaccine (IIV4) age 6 months and greater, preservative free    Need for pneumococcal 20-valent conjugate vaccination     Z23    Relevant Orders    Pneumococcal conjugate vaccine, 20-valent (PREVNAR 20)    Vitamin B 12 deficiency     E53.8    Relevant Orders    Vitamin B12            Current Outpatient Medications   Medication Sig Dispense Refill    allopurinol (Zyloprim) 300 mg tablet Take 1 tablet (300 mg) by mouth once daily. 90 tablet 0    cetirizine (ZYRTEC) 10 mg capsule Take 1 capsule (10 mg) by mouth once daily.      DULoxetine (Cymbalta) 60 mg DR capsule Take 1 capsule (60 mg) by mouth once daily. Do not crush or chew. 90 capsule 0    metoprolol tartrate (Lopressor) 100 mg tablet Take 1 tablet (100 mg) by mouth 3 times a day.      pantoprazole (ProtoNix) 40 mg EC tablet Take 1 tablet (40 mg) by mouth 2 times a day.      pen needle, diabetic (BD Ultra-Fine Micro Pen Needle) 32 gauge x 1/4\" needle USE 1 NEEDLE 5 TIMES A DAY WITH NOVOLOG AND TRESIBA 500 each 0    dapaglifloz propaned-metformin (Xigduo XR) 10-1,000 mg Take 1 tablet by mouth once daily with breakfast. 90 tablet 0    FreeStyle Scotty sensor system (FreeStyle Scotty 14 Day Sensor) kit Use as directed every " 2 weeks 6 each 1    gabapentin (Neurontin) 600 mg tablet Take 1 tablet (600 mg) by mouth 2 times a day. 180 tablet 0    NovoLOG FlexPen U-100 Insulin 100 unit/mL (3 mL) pen Inject 36 Units under the skin in the morning and 36 Units at noon and 36 Units in the evening. Inject with meals. 100 mL 0    simvastatin (Zocor) 10 mg tablet Take 1 tablet (10 mg) by mouth once daily. 90 tablet 1    tirzepatide 12.5 mg/0.5 mL pen injector INJECT 12.5 MG UNDER THE SKIN 1 (ONE) TIME PER WEEK. 6 mL 1    Tresiba FlexTouch U-200 200 unit/mL (3 mL) injection Inject 60 Units under the skin in the morning and 60 Units before bedtime. 60 mL 1    valsartan-hydrochlorothiazide (Diovan-HCT) 320-25 mg tablet Take 1 tablet by mouth once daily. 90 tablet 1     No current facility-administered medications for this visit.     Fasting blood work   P20 and influenza  F/U in 3 months for Diabetes and htn and PE

## 2024-01-17 NOTE — PATIENT INSTRUCTIONS
"Current Outpatient Medications   Medication Sig Dispense Refill    allopurinol (Zyloprim) 300 mg tablet Take 1 tablet (300 mg) by mouth once daily. 90 tablet 0    cetirizine (ZYRTEC) 10 mg capsule Take 1 capsule (10 mg) by mouth once daily.      DULoxetine (Cymbalta) 60 mg DR capsule Take 1 capsule (60 mg) by mouth once daily. Do not crush or chew. 90 capsule 0    metoprolol tartrate (Lopressor) 100 mg tablet Take 1 tablet (100 mg) by mouth 3 times a day.      pantoprazole (ProtoNix) 40 mg EC tablet Take 1 tablet (40 mg) by mouth 2 times a day.      pen needle, diabetic (BD Ultra-Fine Micro Pen Needle) 32 gauge x 1/4\" needle USE 1 NEEDLE 5 TIMES A DAY WITH NOVOLOG AND TRESIBA 500 each 0    dapaglifloz propaned-metformin (Xigduo XR) 10-1,000 mg Take 1 tablet by mouth once daily with breakfast. 90 tablet 0    FreeStyle Scotty sensor system (FreeStyle Scotty 14 Day Sensor) kit Use as directed every 2 weeks 6 each 1    gabapentin (Neurontin) 600 mg tablet Take 1 tablet (600 mg) by mouth 2 times a day. 180 tablet 0    NovoLOG FlexPen U-100 Insulin 100 unit/mL (3 mL) pen Inject 36 Units under the skin in the morning and 36 Units at noon and 36 Units in the evening. Inject with meals. 100 mL 0    simvastatin (Zocor) 10 mg tablet Take 1 tablet (10 mg) by mouth once daily. 90 tablet 1    tirzepatide 12.5 mg/0.5 mL pen injector INJECT 12.5 MG UNDER THE SKIN 1 (ONE) TIME PER WEEK. 6 mL 1    Tresiba FlexTouch U-200 200 unit/mL (3 mL) injection Inject 60 Units under the skin in the morning and 60 Units before bedtime. 60 mL 1    valsartan-hydrochlorothiazide (Diovan-HCT) 320-25 mg tablet Take 1 tablet by mouth once daily. 90 tablet 1     No current facility-administered medications for this visit.     Fasting blood work   P20 and influenza  F/U in 3 months for Diabetes and htn and PE  "

## 2024-01-18 LAB
25(OH)D3 SERPL-MCNC: 23 NG/ML (ref 30–100)
ALBUMIN SERPL BCP-MCNC: 4.8 G/DL (ref 3.4–5)
ALP SERPL-CCNC: 81 U/L (ref 33–136)
ALT SERPL W P-5'-P-CCNC: 33 U/L (ref 10–52)
ANION GAP SERPL CALC-SCNC: 20 MMOL/L (ref 10–20)
AST SERPL W P-5'-P-CCNC: 31 U/L (ref 9–39)
BASOPHILS # BLD AUTO: 0.11 X10*3/UL (ref 0–0.1)
BASOPHILS NFR BLD AUTO: 1 %
BILIRUB SERPL-MCNC: 0.8 MG/DL (ref 0–1.2)
BUN SERPL-MCNC: 17 MG/DL (ref 6–23)
CALCIUM SERPL-MCNC: 10.2 MG/DL (ref 8.6–10.6)
CHLORIDE SERPL-SCNC: 97 MMOL/L (ref 98–107)
CHOLEST SERPL-MCNC: 244 MG/DL (ref 0–199)
CHOLESTEROL/HDL RATIO: 5.1
CO2 SERPL-SCNC: 23 MMOL/L (ref 21–32)
CREAT SERPL-MCNC: 1.12 MG/DL (ref 0.5–1.3)
CRP SERPL-MCNC: 1.76 MG/DL
EGFRCR SERPLBLD CKD-EPI 2021: 75 ML/MIN/1.73M*2
EOSINOPHIL # BLD AUTO: 0.28 X10*3/UL (ref 0–0.7)
EOSINOPHIL NFR BLD AUTO: 2.6 %
ERYTHROCYTE [DISTWIDTH] IN BLOOD BY AUTOMATED COUNT: 13.2 % (ref 11.5–14.5)
ERYTHROCYTE [SEDIMENTATION RATE] IN BLOOD BY WESTERGREN METHOD: 59 MM/H (ref 0–20)
GLUCOSE SERPL-MCNC: 373 MG/DL (ref 74–99)
HCT VFR BLD AUTO: 49 % (ref 41–52)
HDLC SERPL-MCNC: 48.2 MG/DL
HGB BLD-MCNC: 16 G/DL (ref 13.5–17.5)
IMM GRANULOCYTES # BLD AUTO: 0.04 X10*3/UL (ref 0–0.7)
IMM GRANULOCYTES NFR BLD AUTO: 0.4 % (ref 0–0.9)
LDLC SERPL CALC-MCNC: 138 MG/DL
LYMPHOCYTES # BLD AUTO: 2.33 X10*3/UL (ref 1.2–4.8)
LYMPHOCYTES NFR BLD AUTO: 21.9 %
MAGNESIUM SERPL-MCNC: 2.54 MG/DL (ref 1.6–2.4)
MCH RBC QN AUTO: 30 PG (ref 26–34)
MCHC RBC AUTO-ENTMCNC: 32.7 G/DL (ref 32–36)
MCV RBC AUTO: 92 FL (ref 80–100)
MONOCYTES # BLD AUTO: 0.52 X10*3/UL (ref 0.1–1)
MONOCYTES NFR BLD AUTO: 4.9 %
NEUTROPHILS # BLD AUTO: 7.38 X10*3/UL (ref 1.2–7.7)
NEUTROPHILS NFR BLD AUTO: 69.2 %
NON HDL CHOLESTEROL: 196 MG/DL (ref 0–149)
NRBC BLD-RTO: 0 /100 WBCS (ref 0–0)
PLATELET # BLD AUTO: 339 X10*3/UL (ref 150–450)
POTASSIUM SERPL-SCNC: 4.9 MMOL/L (ref 3.5–5.3)
PROLACTIN SERPL-MCNC: 11.1 UG/L (ref 2–18)
PROT SERPL-MCNC: 8.2 G/DL (ref 6.4–8.2)
RBC # BLD AUTO: 5.34 X10*6/UL (ref 4.5–5.9)
SODIUM SERPL-SCNC: 135 MMOL/L (ref 136–145)
TRIGL SERPL-MCNC: 287 MG/DL (ref 0–149)
TSH SERPL-ACNC: 1.42 MIU/L (ref 0.44–3.98)
URATE SERPL-MCNC: 6.3 MG/DL (ref 4–7.5)
VIT B12 SERPL-MCNC: 551 PG/ML (ref 211–911)
VLDL: 57 MG/DL (ref 0–40)
WBC # BLD AUTO: 10.7 X10*3/UL (ref 4.4–11.3)

## 2024-01-18 NOTE — RESULT ENCOUNTER NOTE
Cholesterol is elevated.  Sugars are elevated and sodium levels are low which could lead to diabetic ketoacidosis.  Would like him to make sure his sugars are coming down.  Would like him to switch over to Lipitor 40 mg daily and stop simvastatin magnesium levels are elevated if he is taking any magnesium rich foods or supplements then needs to stop vitamin D levels are low make sure he is taking at least vitamin D 5000 IUs daily.  Sed rate is worse I wonder if he has a infection or may need to work him up for inflammatory or autoimmune process.

## 2024-01-24 ENCOUNTER — PATIENT OUTREACH (OUTPATIENT)
Dept: PRIMARY CARE | Facility: CLINIC | Age: 64
End: 2024-01-24
Payer: COMMERCIAL

## 2024-01-24 DIAGNOSIS — E11.69 HYPERLIPIDEMIA ASSOCIATED WITH TYPE 2 DIABETES MELLITUS (MULTI): ICD-10-CM

## 2024-01-24 DIAGNOSIS — Z79.4 TYPE 2 DIABETES MELLITUS WITH HYPERGLYCEMIA, WITH LONG-TERM CURRENT USE OF INSULIN (MULTI): ICD-10-CM

## 2024-01-24 DIAGNOSIS — E78.5 HYPERLIPIDEMIA ASSOCIATED WITH TYPE 2 DIABETES MELLITUS (MULTI): ICD-10-CM

## 2024-01-24 DIAGNOSIS — E11.65 TYPE 2 DIABETES MELLITUS WITH HYPERGLYCEMIA, WITH LONG-TERM CURRENT USE OF INSULIN (MULTI): ICD-10-CM

## 2024-01-24 PROCEDURE — 99439 CHRNC CARE MGMT STAF EA ADDL: CPT | Performed by: FAMILY MEDICINE

## 2024-01-24 PROCEDURE — 99490 CHRNC CARE MGMT STAFF 1ST 20: CPT | Performed by: FAMILY MEDICINE

## 2024-01-24 RX ORDER — DEXTROSE 4 G
TABLET,CHEWABLE ORAL
Qty: 1 EACH | Refills: 0 | Status: SHIPPED | OUTPATIENT
Start: 2024-01-24

## 2024-01-24 RX ORDER — LANCETS
EACH MISCELLANEOUS
Qty: 100 EACH | Refills: 0 | Status: SHIPPED | OUTPATIENT
Start: 2024-01-24

## 2024-01-24 RX ORDER — BLOOD SUGAR DIAGNOSTIC
1 STRIP MISCELLANEOUS 3 TIMES DAILY
Qty: 100 STRIP | Refills: 0 | Status: SHIPPED | OUTPATIENT
Start: 2024-01-24

## 2024-01-24 RX ORDER — ATORVASTATIN CALCIUM 40 MG/1
40 TABLET, FILM COATED ORAL DAILY
Qty: 90 TABLET | Refills: 1 | Status: SHIPPED | OUTPATIENT
Start: 2024-01-24 | End: 2024-07-22

## 2024-01-24 NOTE — CARE PLAN
Problem: Access to Care Issue  Goal: Assess and Address Access Barriers  Outcome: Progressing     Problem: Medication Adherence  Goal: Adherence to Medication Regimen  Outcome: Progressing       Problem: Risk of Uncoordinated Care  Goal: Care will be Coordinated and Supported by a Multidisciplinary Team of Providers  Outcome: Progressing     Problem: Recent hospitalization or complication while living with DM  Goal: Patient will effectively self-manage their DM disease process  Outcome: Progressing  Intervention: Instruct on blood glucose level monitoring and logging  Intervention: Instruct on the importance of diet and exercise

## 2024-01-24 NOTE — PROGRESS NOTES
"Lab reviewed in detail with patient on Monday of this week. We discussed every level that was not normal and what PCP said about it in the result section of the labs. Patient is going to be changed from his simvastatin to Lipitor which I will pend to PCP. Also he is to increase his vitamin D to 5,000 a day (currently only taking 1,000 daily). He did scan his clau and I was able to review some of the information. Glucose levels are down in the 200's now. Still not great, but better. PCP was concerned that his clau was reading \"almost 100 points higher\" than what his blood work showed the day he was here. I have a feeling it is due to placement on his arm and the fact that it is in interstitial fluid rather than blood. He places his clau on the front part of his upper arm, rather than the back fattier section. But we are going to send in a regular glucometer for him so he can compare readings at home. That way we will have more accurate information. He was very agreeable to this. He is feeling much better now that he is back on his medications. He states that he was in quite a depression and was giving up on himself. But he is in a better place mentally now and attributes that to being back on his meds and having his duloxetine and gabapentin increased. He states that he is hardly eating now that he is back on the mounjaro. He listed to me what he had eaten that day. He said an egg bowl, gatorade, sparkling water, tea with splenda, a brownie made from black beans and 1/4 cup of ramen. I really advised him that we should refer him to a dietitian. He states that he took a class once so he has the knowledge. I think he could really benefit from their services and I told him that. He still does not think he needs to. He wants to give himself some time back on his meds before this happens. I explained that if his sugars do not come down to a normal level, I will be referring to a dietitian. I told patient I would check " with PCP to see if he needs any repeat labs done. Also because we could not get the c-peptide added on to the labs he had performed. She said he didn't need to have labs done for 3 months. His next appt is toward the end of April. I will plan on messaging her for orders prior to that date.

## 2024-03-29 ENCOUNTER — TELEPHONE (OUTPATIENT)
Dept: PRIMARY CARE | Facility: CLINIC | Age: 64
End: 2024-03-29

## 2024-03-29 ENCOUNTER — PATIENT OUTREACH (OUTPATIENT)
Dept: PRIMARY CARE | Facility: CLINIC | Age: 64
End: 2024-03-29
Payer: COMMERCIAL

## 2024-03-29 DIAGNOSIS — E11.65 TYPE 2 DIABETES MELLITUS WITH HYPERGLYCEMIA, WITH LONG-TERM CURRENT USE OF INSULIN (MULTI): ICD-10-CM

## 2024-03-29 DIAGNOSIS — I10 ESSENTIAL (PRIMARY) HYPERTENSION: ICD-10-CM

## 2024-03-29 DIAGNOSIS — E55.9 VITAMIN D DEFICIENCY: ICD-10-CM

## 2024-03-29 DIAGNOSIS — Z79.4 TYPE 2 DIABETES MELLITUS WITH DIABETIC NEUROPATHY, WITH LONG-TERM CURRENT USE OF INSULIN (MULTI): ICD-10-CM

## 2024-03-29 DIAGNOSIS — E78.2 MIXED HYPERLIPIDEMIA: ICD-10-CM

## 2024-03-29 DIAGNOSIS — Z79.4 TYPE 2 DIABETES MELLITUS WITH HYPERGLYCEMIA, WITH LONG-TERM CURRENT USE OF INSULIN (MULTI): ICD-10-CM

## 2024-03-29 DIAGNOSIS — E53.8 VITAMIN B 12 DEFICIENCY: ICD-10-CM

## 2024-03-29 DIAGNOSIS — F33.9 CHRONIC MAJOR DEPRESSIVE DISORDER, RECURRENT EPISODE (CMS-HCC): ICD-10-CM

## 2024-03-29 DIAGNOSIS — E11.40 TYPE 2 DIABETES MELLITUS WITH DIABETIC NEUROPATHY, WITH LONG-TERM CURRENT USE OF INSULIN (MULTI): ICD-10-CM

## 2024-03-29 DIAGNOSIS — Z00.00 ENCOUNTER FOR ANNUAL GENERAL MEDICAL EXAMINATION WITHOUT ABNORMAL FINDINGS IN ADULT: ICD-10-CM

## 2024-03-29 NOTE — PROGRESS NOTES
Patient has his yearly physical on 4/25. Please place lab orders or have [facility] orders. I will follow up with patient to ensure he gets them completed. Thank you.

## 2024-03-29 NOTE — PROGRESS NOTES
Chart review completed prior to CCM outreach. Call completed to patient. No answer. Left message to call back.

## 2024-04-03 ENCOUNTER — PATIENT OUTREACH (OUTPATIENT)
Dept: PRIMARY CARE | Facility: CLINIC | Age: 64
End: 2024-04-03
Payer: COMMERCIAL

## 2024-04-03 DIAGNOSIS — Z79.4 TYPE 2 DIABETES MELLITUS WITH DIABETIC NEUROPATHY, WITH LONG-TERM CURRENT USE OF INSULIN (MULTI): ICD-10-CM

## 2024-04-03 DIAGNOSIS — E11.40 TYPE 2 DIABETES MELLITUS WITH DIABETIC NEUROPATHY, WITH LONG-TERM CURRENT USE OF INSULIN (MULTI): ICD-10-CM

## 2024-04-03 NOTE — PROGRESS NOTES
Chart review completed prior to CCM outreach. Call completed to patient. No Answer. Went straight to voicemail. Left message to call me back about labs that need done prior to his next appt.

## 2024-04-06 DIAGNOSIS — F33.9 CHRONIC MAJOR DEPRESSIVE DISORDER, RECURRENT EPISODE (CMS-HCC): ICD-10-CM

## 2024-04-06 DIAGNOSIS — Z79.4 TYPE 2 DIABETES MELLITUS WITH DIABETIC NEUROPATHY, WITH LONG-TERM CURRENT USE OF INSULIN (MULTI): ICD-10-CM

## 2024-04-06 DIAGNOSIS — E11.40 TYPE 2 DIABETES MELLITUS WITH DIABETIC NEUROPATHY, WITH LONG-TERM CURRENT USE OF INSULIN (MULTI): ICD-10-CM

## 2024-04-06 DIAGNOSIS — Z79.4 TYPE 2 DIABETES MELLITUS WITH HYPERGLYCEMIA, WITH LONG-TERM CURRENT USE OF INSULIN (MULTI): ICD-10-CM

## 2024-04-06 DIAGNOSIS — M10.00 IDIOPATHIC GOUT, UNSPECIFIED CHRONICITY, UNSPECIFIED SITE: ICD-10-CM

## 2024-04-06 DIAGNOSIS — E11.65 TYPE 2 DIABETES MELLITUS WITH HYPERGLYCEMIA, WITH LONG-TERM CURRENT USE OF INSULIN (MULTI): ICD-10-CM

## 2024-04-09 ENCOUNTER — PATIENT OUTREACH (OUTPATIENT)
Dept: PRIMARY CARE | Facility: CLINIC | Age: 64
End: 2024-04-09
Payer: COMMERCIAL

## 2024-04-09 DIAGNOSIS — E11.40 TYPE 2 DIABETES MELLITUS WITH DIABETIC NEUROPATHY, WITH LONG-TERM CURRENT USE OF INSULIN (MULTI): ICD-10-CM

## 2024-04-09 DIAGNOSIS — Z79.4 TYPE 2 DIABETES MELLITUS WITH DIABETIC NEUROPATHY, WITH LONG-TERM CURRENT USE OF INSULIN (MULTI): ICD-10-CM

## 2024-04-09 DIAGNOSIS — F33.9 CHRONIC MAJOR DEPRESSIVE DISORDER, RECURRENT EPISODE (CMS-HCC): ICD-10-CM

## 2024-04-09 RX ORDER — GABAPENTIN 600 MG/1
600 TABLET ORAL 2 TIMES DAILY
Qty: 180 TABLET | Refills: 0 | Status: SHIPPED | OUTPATIENT
Start: 2024-04-09

## 2024-04-09 RX ORDER — DULOXETIN HYDROCHLORIDE 60 MG/1
60 CAPSULE, DELAYED RELEASE ORAL DAILY
Qty: 90 CAPSULE | Refills: 0 | Status: SHIPPED | OUTPATIENT
Start: 2024-04-09

## 2024-04-09 RX ORDER — DAPAGLIFLOZIN AND METFORMIN HYDROCHLORIDE 10; 1000 MG/1; MG/1
TABLET, FILM COATED, EXTENDED RELEASE ORAL
Qty: 90 TABLET | Refills: 0 | Status: SHIPPED
Start: 2024-04-09 | End: 2024-04-25 | Stop reason: SDUPTHER

## 2024-04-09 RX ORDER — ALLOPURINOL 300 MG/1
300 TABLET ORAL DAILY
Qty: 90 TABLET | Refills: 0 | Status: SHIPPED | OUTPATIENT
Start: 2024-04-09

## 2024-04-09 NOTE — PROGRESS NOTES
Outreach attempted again to patient. No answer. Called his wife, no answer on her number either. Left her a message asking if everyone was ok and that I am trying to reach the patient. If they would like to engage with me, they can call me back.

## 2024-04-09 NOTE — PROGRESS NOTES
"Call back received from wife. She states patient is doing ok. He is \"crabby\" but ok. We talked about his need to get out of the house and socialize more. Different options could include book clubs or a local community center. The weather becoming nicer should help him but he has to be motivated to want to leave the house as well. Hopefully if she can find something that will interest him then he will be more willing. We discussed his need for lab work, but it needs to be after the 18th. She said she could take him to the lab on Sat 4/20 but after our conversation I found out our lab is closed. I called her back and left a message that it is closed but if she would like to find an outside lab, I can always fax the orders. She would just need to let me know where to send them.   "

## 2024-04-10 ENCOUNTER — TELEPHONE (OUTPATIENT)
Dept: PRIMARY CARE | Facility: CLINIC | Age: 64
End: 2024-04-10
Payer: COMMERCIAL

## 2024-04-10 DIAGNOSIS — Z79.4 TYPE 2 DIABETES MELLITUS WITH DIABETIC NEUROPATHY, WITH LONG-TERM CURRENT USE OF INSULIN (MULTI): ICD-10-CM

## 2024-04-10 DIAGNOSIS — E11.40 TYPE 2 DIABETES MELLITUS WITH DIABETIC NEUROPATHY, WITH LONG-TERM CURRENT USE OF INSULIN (MULTI): ICD-10-CM

## 2024-04-10 RX ORDER — INSULIN ASPART 100 [IU]/ML
36 INJECTION, SOLUTION INTRAVENOUS; SUBCUTANEOUS
Qty: 105 ML | Refills: 1 | Status: SHIPPED | OUTPATIENT
Start: 2024-04-10 | End: 2024-04-11 | Stop reason: SDUPTHER

## 2024-04-10 RX ORDER — INSULIN DEGLUDEC 200 U/ML
60 INJECTION, SOLUTION SUBCUTANEOUS 2 TIMES DAILY
Qty: 63 ML | Refills: 1 | Status: SHIPPED | OUTPATIENT
Start: 2024-04-10 | End: 2024-04-11 | Stop reason: SDUPTHER

## 2024-04-10 NOTE — TELEPHONE ENCOUNTER
Pt requesting refiills:  Please submit thru [facility] # [phone]    Novolog Flexpen U-100 Insulin  Dose: 36 Units  Quantify: 105  Refill: 1    Tresiba FlexTouch U-200   Dose: 60 units  Dispense Quantity: 63 ml  Refill: 1

## 2024-04-10 NOTE — TELEPHONE ENCOUNTER
Fax request from [facility] requesting a refill on:    Tresiba FlexTouch U-200 200 unit/mL (3 mL) injection  Inject 60 Units under the skin in the morning and 60 Units before bedtime.   Quantity: 60 mL       NovoLOG FlexPen U-100 Insulin 100 unit/mL (3 mL) pen   Inject 36 Units under the skin in the morning and 36 Units at noon and 36 Units in the evening. Inject with meals.   Quantity: 100 mL      [id number] - [place], [place]  Phone: [phone]

## 2024-04-11 RX ORDER — INSULIN DEGLUDEC 200 U/ML
60 INJECTION, SOLUTION SUBCUTANEOUS 2 TIMES DAILY
Qty: 63 ML | Refills: 1 | Status: SHIPPED | OUTPATIENT
Start: 2024-04-11

## 2024-04-11 RX ORDER — INSULIN ASPART 100 [IU]/ML
36 INJECTION, SOLUTION INTRAVENOUS; SUBCUTANEOUS
Qty: 105 ML | Refills: 1 | Status: SHIPPED | OUTPATIENT
Start: 2024-04-11

## 2024-04-17 ENCOUNTER — PATIENT OUTREACH (OUTPATIENT)
Dept: PRIMARY CARE | Facility: CLINIC | Age: 64
End: 2024-04-17
Payer: COMMERCIAL

## 2024-04-17 DIAGNOSIS — Z79.4 TYPE 2 DIABETES MELLITUS WITH DIABETIC NEUROPATHY, WITH LONG-TERM CURRENT USE OF INSULIN (MULTI): ICD-10-CM

## 2024-04-17 DIAGNOSIS — E11.40 TYPE 2 DIABETES MELLITUS WITH DIABETIC NEUROPATHY, WITH LONG-TERM CURRENT USE OF INSULIN (MULTI): ICD-10-CM

## 2024-04-17 DIAGNOSIS — F33.9 CHRONIC MAJOR DEPRESSIVE DISORDER, RECURRENT EPISODE (CMS-HCC): ICD-10-CM

## 2024-04-17 NOTE — PROGRESS NOTES
CCM outreach attempted again. This is attempt #4 without any answer. Left message to call back. I wanted to make sure patient gets his labs done prior to his appt next week. I do not see that these have been completed yet.

## 2024-04-24 ENCOUNTER — PATIENT MESSAGE (OUTPATIENT)
Dept: PRIMARY CARE | Facility: CLINIC | Age: 64
End: 2024-04-24
Payer: COMMERCIAL

## 2024-04-24 ENCOUNTER — PATIENT OUTREACH (OUTPATIENT)
Dept: PRIMARY CARE | Facility: CLINIC | Age: 64
End: 2024-04-24
Payer: COMMERCIAL

## 2024-04-24 DIAGNOSIS — Z79.4 TYPE 2 DIABETES MELLITUS WITH DIABETIC NEUROPATHY, WITH LONG-TERM CURRENT USE OF INSULIN (MULTI): ICD-10-CM

## 2024-04-24 DIAGNOSIS — E11.40 TYPE 2 DIABETES MELLITUS WITH DIABETIC NEUROPATHY, WITH LONG-TERM CURRENT USE OF INSULIN (MULTI): ICD-10-CM

## 2024-04-24 PROCEDURE — 99487 CPLX CHRNC CARE 1ST 60 MIN: CPT | Performed by: FAMILY MEDICINE

## 2024-04-24 RX ORDER — DAPAGLIFLOZIN 10 MG/1
10 TABLET, FILM COATED ORAL DAILY
COMMUNITY
End: 2024-04-25 | Stop reason: ALTCHOICE

## 2024-04-24 NOTE — PROGRESS NOTES
"Patient calls me back after I attempted to reach him 4 times since our last call. He has an appt tomorrow with Dr. Jones. He has not completed his bw. He said he was going to come early in the morning tomorrow so it could be done in time for the appt. I told him that would not work. It will not be resulted. Just keep the appt and he will need labs another time. He will need an A1C in office. Patient is non-compliant with his checking his glucose levels. He has not been using his scotty nor has he been doing finger sticks. He states he knows his glucose levels based on how his body feels. He says if he is peeing like crazy it is high and if he feels dizzy then it is low. I explained that this is an incredibly inaccurate way for him to truly know how well controlled his glucose is. I attempted to review patient's medications with him. He states he is not taking Xigduo. He states he takes Farxiga and Metformin. He said he was supposed to finish up his Farxiga before he started on the Xigduo. I asked him what Metformin dose he is on and he thought it was 1,000 mg. He went to find the bottle and then couldn't find it. So now he thinks he is not taking it but might be taking Farxiga and Xigduo?? I don't see in his dispense report that he has filled Metformin in the last year. I cannot get a straight answer as to what he is actually taking. I advised he keep his appt tomorrow so this can be sorted out. I am going to send a message to [facility] as I really need some assistance with managing his Diabetes medications. Also maybe they can see about switching him to a Dexcom or something. He talked about how he feels his Scotty sensor is \"inaccurate\" most of the time. Even though he never wears it and doesn't do fingersticks?   "

## 2024-04-25 ENCOUNTER — LAB (OUTPATIENT)
Dept: LAB | Facility: LAB | Age: 64
End: 2024-04-25
Payer: COMMERCIAL

## 2024-04-25 ENCOUNTER — OFFICE VISIT (OUTPATIENT)
Dept: PRIMARY CARE | Facility: CLINIC | Age: 64
End: 2024-04-25
Payer: COMMERCIAL

## 2024-04-25 VITALS
SYSTOLIC BLOOD PRESSURE: 93 MMHG | WEIGHT: 315 LBS | HEIGHT: 72 IN | BODY MASS INDEX: 42.66 KG/M2 | HEART RATE: 110 BPM | DIASTOLIC BLOOD PRESSURE: 66 MMHG | OXYGEN SATURATION: 94 %

## 2024-04-25 DIAGNOSIS — I10 ESSENTIAL (PRIMARY) HYPERTENSION: ICD-10-CM

## 2024-04-25 DIAGNOSIS — E78.5 HYPERLIPIDEMIA ASSOCIATED WITH TYPE 2 DIABETES MELLITUS (MULTI): ICD-10-CM

## 2024-04-25 DIAGNOSIS — Z00.00 ENCOUNTER FOR ANNUAL GENERAL MEDICAL EXAMINATION WITHOUT ABNORMAL FINDINGS IN ADULT: Primary | ICD-10-CM

## 2024-04-25 DIAGNOSIS — Z79.4 TYPE 2 DIABETES MELLITUS WITH HYPERGLYCEMIA, WITH LONG-TERM CURRENT USE OF INSULIN (MULTI): ICD-10-CM

## 2024-04-25 DIAGNOSIS — E66.01 MORBID OBESITY WITH BODY MASS INDEX (BMI) OF 40.0 OR HIGHER (MULTI): ICD-10-CM

## 2024-04-25 DIAGNOSIS — Z00.00 ENCOUNTER FOR ANNUAL GENERAL MEDICAL EXAMINATION WITHOUT ABNORMAL FINDINGS IN ADULT: ICD-10-CM

## 2024-04-25 DIAGNOSIS — Z79.4 TYPE 2 DIABETES MELLITUS WITH DIABETIC NEUROPATHY, WITH LONG-TERM CURRENT USE OF INSULIN (MULTI): Primary | ICD-10-CM

## 2024-04-25 DIAGNOSIS — E55.9 VITAMIN D DEFICIENCY: ICD-10-CM

## 2024-04-25 DIAGNOSIS — M1A.20X0 CHRONIC GOUT DUE TO DRUG WITHOUT TOPHUS, UNSPECIFIED SITE: ICD-10-CM

## 2024-04-25 DIAGNOSIS — E11.69 HYPERLIPIDEMIA ASSOCIATED WITH TYPE 2 DIABETES MELLITUS (MULTI): ICD-10-CM

## 2024-04-25 DIAGNOSIS — E11.65 TYPE 2 DIABETES MELLITUS WITH HYPERGLYCEMIA, WITH LONG-TERM CURRENT USE OF INSULIN (MULTI): ICD-10-CM

## 2024-04-25 DIAGNOSIS — M54.50 CHRONIC BILATERAL LOW BACK PAIN WITHOUT SCIATICA: Primary | ICD-10-CM

## 2024-04-25 DIAGNOSIS — E83.52 HYPERCALCEMIA: ICD-10-CM

## 2024-04-25 DIAGNOSIS — K21.9 GASTROESOPHAGEAL REFLUX DISEASE WITHOUT ESOPHAGITIS: ICD-10-CM

## 2024-04-25 DIAGNOSIS — R09.81 SINUS CONGESTION: Primary | ICD-10-CM

## 2024-04-25 DIAGNOSIS — E11.40 TYPE 2 DIABETES MELLITUS WITH DIABETIC NEUROPATHY, WITH LONG-TERM CURRENT USE OF INSULIN (MULTI): ICD-10-CM

## 2024-04-25 DIAGNOSIS — I47.10 SVT (SUPRAVENTRICULAR TACHYCARDIA) (CMS-HCC): ICD-10-CM

## 2024-04-25 DIAGNOSIS — Z79.4 TYPE 2 DIABETES MELLITUS WITH DIABETIC NEUROPATHY, WITH LONG-TERM CURRENT USE OF INSULIN (MULTI): ICD-10-CM

## 2024-04-25 DIAGNOSIS — G89.29 CHRONIC BILATERAL LOW BACK PAIN WITHOUT SCIATICA: Primary | ICD-10-CM

## 2024-04-25 DIAGNOSIS — Z13.31 ENCOUNTER FOR SCREENING FOR DEPRESSION: ICD-10-CM

## 2024-04-25 DIAGNOSIS — E53.8 VITAMIN B 12 DEFICIENCY: ICD-10-CM

## 2024-04-25 DIAGNOSIS — E11.40 TYPE 2 DIABETES MELLITUS WITH DIABETIC NEUROPATHY, WITH LONG-TERM CURRENT USE OF INSULIN (MULTI): Primary | ICD-10-CM

## 2024-04-25 LAB
POC FINGERSTICK BLOOD GLUCOSE: 211 MG/DL (ref 70–100)
POC HEMOGLOBIN A1C: 10.6 % (ref 4.2–6.5)

## 2024-04-25 PROCEDURE — 83036 HEMOGLOBIN GLYCOSYLATED A1C: CPT

## 2024-04-25 PROCEDURE — 3050F LDL-C >= 130 MG/DL: CPT | Performed by: FAMILY MEDICINE

## 2024-04-25 PROCEDURE — 99396 PREV VISIT EST AGE 40-64: CPT | Performed by: FAMILY MEDICINE

## 2024-04-25 PROCEDURE — 36415 COLL VENOUS BLD VENIPUNCTURE: CPT

## 2024-04-25 PROCEDURE — 3074F SYST BP LT 130 MM HG: CPT | Performed by: FAMILY MEDICINE

## 2024-04-25 PROCEDURE — 3046F HEMOGLOBIN A1C LEVEL >9.0%: CPT | Performed by: FAMILY MEDICINE

## 2024-04-25 PROCEDURE — 85025 COMPLETE CBC W/AUTO DIFF WBC: CPT

## 2024-04-25 PROCEDURE — 83036 HEMOGLOBIN GLYCOSYLATED A1C: CPT | Performed by: FAMILY MEDICINE

## 2024-04-25 PROCEDURE — 82306 VITAMIN D 25 HYDROXY: CPT

## 2024-04-25 PROCEDURE — 84443 ASSAY THYROID STIM HORMONE: CPT

## 2024-04-25 PROCEDURE — 3078F DIAST BP <80 MM HG: CPT | Performed by: FAMILY MEDICINE

## 2024-04-25 PROCEDURE — 82607 VITAMIN B-12: CPT

## 2024-04-25 PROCEDURE — 99215 OFFICE O/P EST HI 40 MIN: CPT | Performed by: FAMILY MEDICINE

## 2024-04-25 PROCEDURE — 82962 GLUCOSE BLOOD TEST: CPT | Performed by: FAMILY MEDICINE

## 2024-04-25 PROCEDURE — 80053 COMPREHEN METABOLIC PANEL: CPT

## 2024-04-25 RX ORDER — FLASH GLUCOSE SENSOR
KIT MISCELLANEOUS
Qty: 6 EACH | Refills: 1 | Status: CANCELLED | OUTPATIENT
Start: 2024-04-25

## 2024-04-25 RX ORDER — DAPAGLIFLOZIN AND METFORMIN HYDROCHLORIDE 5; 1000 MG/1; MG/1
1 TABLET, FILM COATED, EXTENDED RELEASE ORAL
Qty: 180 TABLET | Refills: 0 | Status: SHIPPED | OUTPATIENT
Start: 2024-04-25

## 2024-04-25 RX ORDER — TIRZEPATIDE 15 MG/.5ML
15 INJECTION, SOLUTION SUBCUTANEOUS
Qty: 6 ML | Refills: 0 | Status: SHIPPED | OUTPATIENT
Start: 2024-04-28

## 2024-04-25 RX ORDER — VALSARTAN AND HYDROCHLOROTHIAZIDE 160; 12.5 MG/1; MG/1
1 TABLET, FILM COATED ORAL DAILY
Qty: 30 TABLET | Refills: 0 | Status: SHIPPED
Start: 2024-04-25 | End: 2024-06-03 | Stop reason: ALTCHOICE

## 2024-04-25 RX ORDER — PANTOPRAZOLE SODIUM 40 MG/1
40 TABLET, DELAYED RELEASE ORAL 2 TIMES DAILY
Qty: 180 TABLET | Refills: 1 | Status: SHIPPED | OUTPATIENT
Start: 2024-04-25

## 2024-04-25 RX ORDER — METOPROLOL TARTRATE 100 MG/1
100 TABLET ORAL 3 TIMES DAILY
Qty: 270 TABLET | Refills: 0 | Status: CANCELLED | OUTPATIENT
Start: 2024-04-25

## 2024-04-25 RX ORDER — METOPROLOL TARTRATE 100 MG/1
100 TABLET ORAL DAILY
Qty: 90 TABLET | Refills: 0 | Status: SHIPPED | OUTPATIENT
Start: 2024-04-25

## 2024-04-25 ASSESSMENT — ENCOUNTER SYMPTOMS
SLEEP DISTURBANCE: 0
UNEXPECTED WEIGHT CHANGE: 0
DIZZINESS: 0
NAUSEA: 0
NERVOUS/ANXIOUS: 0
RHINORRHEA: 0
PALPITATIONS: 0
SINUS PRESSURE: 0
COLOR CHANGE: 0
POLYDIPSIA: 0
EYE PAIN: 0
JOINT SWELLING: 0
EYE ITCHING: 0
MYALGIAS: 0
HEMATURIA: 0
FACIAL SWELLING: 0
AGITATION: 0
BLOOD IN STOOL: 0
NECK STIFFNESS: 0
CONSTIPATION: 0
TROUBLE SWALLOWING: 0
ARTHRALGIAS: 0
POLYPHAGIA: 0
WOUND: 0
SINUS PAIN: 0
BACK PAIN: 0
VOICE CHANGE: 0
CHILLS: 0
WHEEZING: 0
FLANK PAIN: 0
VOMITING: 0
DIAPHORESIS: 0
APPETITE CHANGE: 1
FREQUENCY: 0
DIARRHEA: 0
FEVER: 0
EYE REDNESS: 0
SHORTNESS OF BREATH: 0
DYSURIA: 0
COUGH: 0
CHEST TIGHTNESS: 0
ADENOPATHY: 0
FATIGUE: 1
EYE DISCHARGE: 0
ACTIVITY CHANGE: 0
ABDOMINAL PAIN: 0
SORE THROAT: 0
BRUISES/BLEEDS EASILY: 0

## 2024-04-25 NOTE — PROGRESS NOTES
"Subjective   Patient ID: Reza Red is a 63 y.o. male who presents for Annual Exam.  Today he is accompanied by alone.     Well Adult Physical   Patient here for a comprehensive physical exam.The patient reports problems - not been checking his sugars and BP is low    Do you take any herbs or supplements that were not prescribed by a doctor? no   Are you taking calcium supplements? no   Are you taking aspirin daily? no     History:    Date last prostate exam: not sure  Date last PSA: 2023        Subjective:   Reza Red is a 63 y.o. male with hypertension.  Current Outpatient Medications   Medication Sig Dispense Refill    allopurinol (Zyloprim) 300 mg tablet TAKE ONE TABLET BY MOUTH ONE TIME DAILY 90 tablet 0    atorvastatin (Lipitor) 40 mg tablet Take 1 tablet (40 mg) by mouth once daily. 90 tablet 1    blood sugar diagnostic (Accu-Chek Dorie Plus test strp) strip 1 strip 3 times a day. 100 strip 0    blood-glucose meter misc Use to check glucose levels 3 times a day as directed 1 each 0    DULoxetine (Cymbalta) 60 mg DR capsule TAKE ONE CAPSULE BY MOUTH ONE TIME DAILY 90 capsule 0    FreeStyle Scotty sensor system (FreeStyle Scotty 14 Day Sensor) kit Use as directed every 2 weeks 6 each 1    gabapentin (Neurontin) 600 mg tablet TAKE ONE TABLET BY MOUTH TWICE DAILY 180 tablet 0    insulin aspart (NovoLOG Flexpen U-100 Insulin) 100 unit/mL (3 mL) pen Inject 36 Units under the skin 3 times a day with meals. 105 mL 1    insulin degludec (Tresiba FlexTouch U-200) 200 unit/mL (3 mL) injection Inject 60 Units under the skin 2 times a day. 63 mL 1    lancets misc Use to check glucose levels 3 times a day as directed 100 each 0    pen needle, diabetic (BD Ultra-Fine Micro Pen Needle) 32 gauge x 1/4\" needle USE 1 NEEDLE 5 TIMES A DAY WITH NOVOLOG AND TRESIBA 500 each 0    dapaglifloz propaned-metformin (Xigduo XR) 5-1,000 mg Take 1 tablet by mouth 2 times a day before meals. 180 tablet 0    metoprolol tartrate " (Lopressor) 100 mg tablet Take 1 tablet (100 mg) by mouth once daily. 90 tablet 0    pantoprazole (ProtoNix) 40 mg EC tablet Take 1 tablet (40 mg) by mouth 2 times a day. 180 tablet 1    tirzepatide (Mounjaro) 15 mg/0.5 mL pen injector Inject 15 mg under the skin 1 (one) time per week. 6 mL 0    valsartan-hydrochlorothiazide (Diovan-HCT) 160-12.5 mg tablet Take 1 tablet by mouth once daily. 30 tablet 0     No current facility-administered medications for this visit.      Hypertension ROS: taking medications as instructed, no medication side effects noted, no TIA's, no chest pain on exertion, no dyspnea on exertion, and no swelling of ankles.   New concerns: Diabetes - not controlled  He presents for his follow-up diabetic visit. He has type 2 diabetes mellitus. His disease course has been worsening. There are no hypoglycemic associated symptoms. Associated symptoms include weight loss. Pertinent negatives for diabetes include no blurred vision, no chest pain, no fatigue, no foot paresthesias, no foot ulcerations, no polydipsia, no polyphagia, no polyuria, no visual change and no weakness. There are no hypoglycemic complications. Symptoms are worsening. Diabetic complications include autonomic neuropathy. Risk factors for coronary artery disease include diabetes mellitus, dyslipidemia, hypertension, male sex, obesity, stress and sedentary lifestyle. Current diabetic treatment includes intensive insulin program and oral agent (dual therapy). He is compliant with treatment most of the time. His weight is increasing rapidly. He is following a generally healthy diet. When asked about meal planning, he reported none. He has not had a previous visit with a dietitian. He rarely participates in exercise. His breakfast blood glucose range is generally >200 mg/dl. His lunch blood glucose range is generally >200 mg/dl. His dinner blood glucose range is generally >200 mg/dl. His bedtime blood glucose range is generally >200  "mg/dl. His overall blood glucose range is >200 mg/dl. An ACE inhibitor/angiotensin II receptor blocker is being taken. He does not see a podiatrist.Eye exam is current. .     Objective:   BP 93/66 (BP Location: Left arm, Patient Position: Sitting, BP Cuff Size: Large adult)   Pulse 110   Ht 1.822 m (5' 11.75\")   Wt 147 kg (324 lb 4 oz)   SpO2 94%   BMI 44.28 kg/m²      Review of Systems   Constitutional:  Positive for appetite change and fatigue. Negative for activity change, chills, diaphoresis, fever and unexpected weight change.   HENT:  Negative for congestion, dental problem, drooling, ear discharge, ear pain, facial swelling, hearing loss, nosebleeds, postnasal drip, rhinorrhea, sinus pressure, sinus pain, sneezing, sore throat, tinnitus, trouble swallowing and voice change.    Eyes:  Negative for pain, discharge, redness, itching and visual disturbance.   Respiratory:  Negative for cough, chest tightness, shortness of breath and wheezing.    Cardiovascular:  Negative for chest pain, palpitations and leg swelling.   Gastrointestinal:  Negative for abdominal pain, blood in stool, constipation, diarrhea, nausea and vomiting.   Endocrine: Negative for cold intolerance, heat intolerance, polydipsia, polyphagia and polyuria.   Genitourinary:  Negative for decreased urine volume, dysuria, flank pain, frequency, hematuria and urgency.   Musculoskeletal:  Negative for arthralgias, back pain, gait problem, joint swelling, myalgias and neck stiffness.   Skin:  Negative for color change, pallor, rash and wound.   Neurological:  Negative for dizziness.   Hematological:  Negative for adenopathy. Does not bruise/bleed easily.   Psychiatric/Behavioral:  Negative for agitation, behavioral problems and sleep disturbance. The patient is not nervous/anxious.        Objective   BP 93/66 (BP Location: Left arm, Patient Position: Sitting, BP Cuff Size: Large adult)   Pulse 110   Ht 1.822 m (5' 11.75\")   Wt 147 kg (324 lb 4 " oz)   SpO2 94%   BMI 44.28 kg/m²   BSA: 2.73 meters squared  Growth percentiles: Facility age limit for growth %chandrakant is 20 years. Facility age limit for growth %chandrakant is 20 years.     Physical Exam  Vitals and nursing note reviewed.   Constitutional:       General: He is not in acute distress.     Appearance: Normal appearance. He is normal weight. He is not ill-appearing or toxic-appearing.   HENT:      Head: Normocephalic.      Right Ear: Tympanic membrane, ear canal and external ear normal. There is no impacted cerumen.      Left Ear: Tympanic membrane, ear canal and external ear normal. There is no impacted cerumen.      Nose: Nose normal. No congestion or rhinorrhea.      Mouth/Throat:      Mouth: Mucous membranes are moist.      Pharynx: Oropharynx is clear. No oropharyngeal exudate or posterior oropharyngeal erythema.   Eyes:      General: No scleral icterus.        Right eye: No discharge.         Left eye: No discharge.      Extraocular Movements: Extraocular movements intact.      Conjunctiva/sclera: Conjunctivae normal.      Pupils: Pupils are equal, round, and reactive to light.   Neck:      Vascular: No carotid bruit.   Cardiovascular:      Rate and Rhythm: Normal rate and regular rhythm.      Pulses: Normal pulses.      Heart sounds: Normal heart sounds. No murmur heard.     No gallop.   Pulmonary:      Effort: No respiratory distress.      Breath sounds: No wheezing or rhonchi.   Chest:      Chest wall: No tenderness.   Abdominal:      General: Abdomen is flat. Bowel sounds are normal.      Palpations: Abdomen is soft. There is no mass.      Tenderness: There is no abdominal tenderness. There is no guarding or rebound.      Hernia: No hernia is present.   Musculoskeletal:         General: No swelling or tenderness. Normal range of motion.      Cervical back: Normal range of motion. No rigidity or tenderness.      Right lower leg: No edema.      Left lower leg: No edema.   Lymphadenopathy:       "Cervical: No cervical adenopathy.   Skin:     General: Skin is warm and dry.      Coloration: Skin is not pale.      Findings: No bruising, erythema or rash.   Neurological:      General: No focal deficit present.      Mental Status: He is alert and oriented to person, place, and time.      Sensory: No sensory deficit.      Coordination: Coordination normal.      Gait: Gait normal.      Deep Tendon Reflexes: Reflexes normal.   Psychiatric:         Mood and Affect: Mood normal.         Behavior: Behavior normal.         Thought Content: Thought content normal.         Judgment: Judgment normal.         Assessment/Plan   Problem List Items Addressed This Visit             ICD-10-CM    Gastroesophageal reflux disease without esophagitis K21.9     Stable on current medications  Continue meds and exercise.            Relevant Medications    pantoprazole (ProtoNix) 40 mg EC tablet    Essential (primary) hypertension I10    Relevant Medications    valsartan-hydrochlorothiazide (Diovan-HCT) 160-12.5 mg tablet    Hyperlipidemia associated with type 2 diabetes mellitus (Multi) E11.69, E78.5     Hyperlipidemia Assessment  Dyslipidemia under poor control,    Target levels for LDL are: < 70 mg/dl (\"very high\" risk for CHD)    Explained to him the respective contributions of genetics, diet, and exercise to lipid levels and the use of medication in severe cases which do not respond to lifestyle alteration. His interest and motivation in making lifestyle changes seems fair.     Hyperlipidemia Plan  The following changes are planned for the next 1 months, at which time the patient will return for repeat fasting lipids:    1. Dietary changes:   Increase soluble fiber  Reduce saturated fat, \"trans\" monounsaturated fatty acids, and cholesterol  2. Exercise changes:   needs to increase   3. Other treatment   Treatment of diabetes (as listed)  Treatment of hypertension (as listed)  Weight reduction (continue)  4. Lipid-lowering " medications:  continue meds  (Recommended by NCEP after 3-6 months of dietary therapy & lifestyle modification,   except if CHD is present or LDL well above 190.)  5. Screening for secondary causes of dyslipidemias:  All labs    Note: The majority of the visit was spent in counseling on the pathophysiology and treatment of dyslipidemias. The total face-to-face time was in excess of 45 minutes.           Morbid obesity with body mass index (BMI) of 40.0 or higher (Multi) E66.01     Obesity with BMI and comorbidities as noted above.   1. Discussed proper diet (low fat, low sodium, high fiber) with patient.   2. Discussed need for regular exercise (3 times per week, 20 minutes per session) with patient.            Diabetes mellitus with hyperglycemia, with long-term current use of insulin (Multi) E11.65, Z79.4     Diabetes mellitus Type II, under poor control.  Discussed general issues about diabetes pathophysiology and management.  Counseling at today's visit: discussed the need for weight loss, focused on the need for regular aerobic exercise, focused on the need to adhere to the prescribed ADA diet, discussed the advantages of a diet low in carbohydrates, and reminded to check sugars regularly and to bring readings in at the time of the next visit.  Educational material distributed.  Addressed ADA diet.  Suggested low cholesterol diet.  Encouraged aerobic exercise.           Relevant Medications    dapaglifloz propaned-metformin (Xigduo XR) 5-1,000 mg    tirzepatide (Mounjaro) 15 mg/0.5 mL pen injector    Type 2 diabetes mellitus with diabetic neuropathy, with long-term current use of insulin (Multi) E11.40, Z79.4     Diabetes mellitus Type II, under poor control.  Discussed general issues about diabetes pathophysiology and management.  Counseling at today's visit: discussed the need for weight loss, focused on the need for regular aerobic exercise, focused on the need to adhere to the prescribed ADA diet, discussed  the advantages of a diet low in carbohydrates, and reminded to check sugars regularly and to bring readings in at the time of the next visit.  Educational material distributed.  Addressed ADA diet.  Suggested low cholesterol diet.  Encouraged aerobic exercise.           Relevant Medications    tirzepatide (Mounjaro) 15 mg/0.5 mL pen injector    Other Relevant Orders    POCT fingerstick glucose manually resulted (Completed)    POCT glycosylated hemoglobin (Hb A1C) manually resulted (Completed)    Supraventricular tachycardia (CMS-HCC) I47.10     Not taking medications as prescribed per cardiology         Relevant Medications    metoprolol tartrate (Lopressor) 100 mg tablet     Other Visit Diagnoses         Codes    Encounter for annual general medical examination without abnormal findings in adult    -  Primary Z00.00    Encounter for screening for depression     Z13.31            Current Outpatient Medications   Medication Sig Dispense Refill    allopurinol (Zyloprim) 300 mg tablet TAKE ONE TABLET BY MOUTH ONE TIME DAILY 90 tablet 0    atorvastatin (Lipitor) 40 mg tablet Take 1 tablet (40 mg) by mouth once daily. 90 tablet 1    blood sugar diagnostic (Accu-Chek Dorie Plus test strp) strip 1 strip 3 times a day. 100 strip 0    blood-glucose meter misc Use to check glucose levels 3 times a day as directed 1 each 0    DULoxetine (Cymbalta) 60 mg DR capsule TAKE ONE CAPSULE BY MOUTH ONE TIME DAILY 90 capsule 0    FreeStyle Scotty sensor system (FreeStyle Scotty 14 Day Sensor) kit Use as directed every 2 weeks 6 each 1    gabapentin (Neurontin) 600 mg tablet TAKE ONE TABLET BY MOUTH TWICE DAILY 180 tablet 0    insulin aspart (NovoLOG Flexpen U-100 Insulin) 100 unit/mL (3 mL) pen Inject 36 Units under the skin 3 times a day with meals. 105 mL 1    insulin degludec (Tresiba FlexTouch U-200) 200 unit/mL (3 mL) injection Inject 60 Units under the skin 2 times a day. 63 mL 1    lancets misc Use to check glucose levels 3 times a  "day as directed 100 each 0    pen needle, diabetic (BD Ultra-Fine Micro Pen Needle) 32 gauge x 1/4\" needle USE 1 NEEDLE 5 TIMES A DAY WITH NOVOLOG AND TRESIBA 500 each 0    dapaglifloz propaned-metformin (Xigduo XR) 5-1,000 mg Take 1 tablet by mouth 2 times a day before meals. 180 tablet 0    metoprolol tartrate (Lopressor) 100 mg tablet Take 1 tablet (100 mg) by mouth once daily. 90 tablet 0    pantoprazole (ProtoNix) 40 mg EC tablet Take 1 tablet (40 mg) by mouth 2 times a day. 180 tablet 1    tirzepatide (Mounjaro) 15 mg/0.5 mL pen injector Inject 15 mg under the skin 1 (one) time per week. 6 mL 0    valsartan-hydrochlorothiazide (Diovan-HCT) 160-12.5 mg tablet Take 1 tablet by mouth once daily. 30 tablet 0     No current facility-administered medications for this visit.     Refer to ophthalmology for diabetic retinopathy    Increase Mounjaro  Decrease Valsartan/hydrochlorothiazide  Continue Metoprolol at 100 mg daily and needs to see cardiology  F/U in 1 month for HTN  Push fluids  Need fasting blood work  Consider Dexcom - will discuss with pharmacy  Pill Pack  Call with BP readings in next week      Current Outpatient Medications   Medication Sig Dispense Refill    allopurinol (Zyloprim) 300 mg tablet TAKE ONE TABLET BY MOUTH ONE TIME DAILY 90 tablet 0    atorvastatin (Lipitor) 40 mg tablet Take 1 tablet (40 mg) by mouth once daily. 90 tablet 1    blood sugar diagnostic (Accu-Chek Dorie Plus test strp) strip 1 strip 3 times a day. 100 strip 0    blood-glucose meter misc Use to check glucose levels 3 times a day as directed 1 each 0    DULoxetine (Cymbalta) 60 mg DR capsule TAKE ONE CAPSULE BY MOUTH ONE TIME DAILY 90 capsule 0    FreeStyle Scotty sensor system (FreeStyle Scotty 14 Day Sensor) kit Use as directed every 2 weeks 6 each 1    gabapentin (Neurontin) 600 mg tablet TAKE ONE TABLET BY MOUTH TWICE DAILY 180 tablet 0    insulin aspart (NovoLOG Flexpen U-100 Insulin) 100 unit/mL (3 mL) pen Inject 36 Units " "under the skin 3 times a day with meals. 105 mL 1    insulin degludec (Tresiba FlexTouch U-200) 200 unit/mL (3 mL) injection Inject 60 Units under the skin 2 times a day. 63 mL 1    lancets misc Use to check glucose levels 3 times a day as directed 100 each 0    pen needle, diabetic (BD Ultra-Fine Micro Pen Needle) 32 gauge x 1/4\" needle USE 1 NEEDLE 5 TIMES A DAY WITH NOVOLOG AND TRESIBA 500 each 0    dapaglifloz propaned-metformin (Xigduo XR) 5-1,000 mg Take 1 tablet by mouth 2 times a day before meals. 180 tablet 0    metoprolol tartrate (Lopressor) 100 mg tablet Take 1 tablet (100 mg) by mouth once daily. 90 tablet 0    pantoprazole (ProtoNix) 40 mg EC tablet Take 1 tablet (40 mg) by mouth 2 times a day. 180 tablet 1    tirzepatide (Mounjaro) 15 mg/0.5 mL pen injector Inject 15 mg under the skin 1 (one) time per week. 6 mL 0    valsartan-hydrochlorothiazide (Diovan-HCT) 160-12.5 mg tablet Take 1 tablet by mouth once daily. 30 tablet 0     No current facility-administered medications for this visit.     I personally spent over 50% of a total 90 minutes in counseling and discussion with the patient and coordination of care as described above.     "

## 2024-04-25 NOTE — ASSESSMENT & PLAN NOTE
"Hyperlipidemia Assessment  Dyslipidemia under poor control,    Target levels for LDL are: < 70 mg/dl (\"very high\" risk for CHD)    Explained to him the respective contributions of genetics, diet, and exercise to lipid levels and the use of medication in severe cases which do not respond to lifestyle alteration. His interest and motivation in making lifestyle changes seems fair.     Hyperlipidemia Plan  The following changes are planned for the next 1 months, at which time the patient will return for repeat fasting lipids:    1. Dietary changes:   Increase soluble fiber  Reduce saturated fat, \"trans\" monounsaturated fatty acids, and cholesterol  2. Exercise changes:   needs to increase   3. Other treatment   Treatment of diabetes (as listed)  Treatment of hypertension (as listed)  Weight reduction (continue)  4. Lipid-lowering medications:  continue meds  (Recommended by NCEP after 3-6 months of dietary therapy & lifestyle modification,   except if CHD is present or LDL well above 190.)  5. Screening for secondary causes of dyslipidemias:  All labs    Note: The majority of the visit was spent in counseling on the pathophysiology and treatment of dyslipidemias. The total face-to-face time was in excess of 45 minutes.    "

## 2024-04-25 NOTE — PATIENT INSTRUCTIONS
"Refer to ophthalmology for diabetic retinopathy    Increase Mounjaro  Decrease Valsartan/hydrochlorothiazide  Continue Metoprolol at 100 mg daily and needs to see cardiology  F/U in 1 month for HTN  Push fluids  Need fasting blood work  Consider Dexcom - will discuss with pharmacy  Pill Pack  Call with BP readings in next week    Current Outpatient Medications   Medication Sig Dispense Refill    allopurinol (Zyloprim) 300 mg tablet TAKE ONE TABLET BY MOUTH ONE TIME DAILY 90 tablet 0    atorvastatin (Lipitor) 40 mg tablet Take 1 tablet (40 mg) by mouth once daily. 90 tablet 1    blood sugar diagnostic (Accu-Chek Dorie Plus test strp) strip 1 strip 3 times a day. 100 strip 0    blood-glucose meter misc Use to check glucose levels 3 times a day as directed 1 each 0    DULoxetine (Cymbalta) 60 mg DR capsule TAKE ONE CAPSULE BY MOUTH ONE TIME DAILY 90 capsule 0    FreeStyle Scotty sensor system (FreeStyle Scotty 14 Day Sensor) kit Use as directed every 2 weeks 6 each 1    gabapentin (Neurontin) 600 mg tablet TAKE ONE TABLET BY MOUTH TWICE DAILY 180 tablet 0    insulin aspart (NovoLOG Flexpen U-100 Insulin) 100 unit/mL (3 mL) pen Inject 36 Units under the skin 3 times a day with meals. 105 mL 1    insulin degludec (Tresiba FlexTouch U-200) 200 unit/mL (3 mL) injection Inject 60 Units under the skin 2 times a day. 63 mL 1    lancets misc Use to check glucose levels 3 times a day as directed 100 each 0    pen needle, diabetic (BD Ultra-Fine Micro Pen Needle) 32 gauge x 1/4\" needle USE 1 NEEDLE 5 TIMES A DAY WITH NOVOLOG AND TRESIBA 500 each 0    dapaglifloz propaned-metformin (Xigduo XR) 5-1,000 mg Take 1 tablet by mouth 2 times a day before meals. 180 tablet 0    metoprolol tartrate (Lopressor) 100 mg tablet Take 1 tablet (100 mg) by mouth once daily. 90 tablet 0    pantoprazole (ProtoNix) 40 mg EC tablet Take 1 tablet (40 mg) by mouth 2 times a day. 180 tablet 1    [START ON 4/28/2024] tirzepatide (Mounjaro) 15 mg/0.5 mL " pen injector Inject 15 mg under the skin 1 (one) time per week. 6 mL 0    valsartan-hydrochlorothiazide (Diovan-HCT) 160-12.5 mg tablet Take 1 tablet by mouth once daily. 30 tablet 0     No current facility-administered medications for this visit.

## 2024-04-26 LAB
25(OH)D3 SERPL-MCNC: 28 NG/ML (ref 30–100)
ALBUMIN SERPL BCP-MCNC: 4.5 G/DL (ref 3.4–5)
ALP SERPL-CCNC: 71 U/L (ref 33–136)
ALT SERPL W P-5'-P-CCNC: 23 U/L (ref 10–52)
ANION GAP SERPL CALC-SCNC: 27 MMOL/L (ref 10–20)
AST SERPL W P-5'-P-CCNC: 22 U/L (ref 9–39)
BASOPHILS # BLD AUTO: 0.11 X10*3/UL (ref 0–0.1)
BASOPHILS NFR BLD AUTO: 0.8 %
BILIRUB SERPL-MCNC: 0.7 MG/DL (ref 0–1.2)
BUN SERPL-MCNC: 18 MG/DL (ref 6–23)
CALCIUM SERPL-MCNC: 11.6 MG/DL (ref 8.6–10.6)
CHLORIDE SERPL-SCNC: 98 MMOL/L (ref 98–107)
CO2 SERPL-SCNC: 18 MMOL/L (ref 21–32)
CREAT SERPL-MCNC: 1.26 MG/DL (ref 0.5–1.3)
EGFRCR SERPLBLD CKD-EPI 2021: 65 ML/MIN/1.73M*2
EOSINOPHIL # BLD AUTO: 0.24 X10*3/UL (ref 0–0.7)
EOSINOPHIL NFR BLD AUTO: 1.7 %
ERYTHROCYTE [DISTWIDTH] IN BLOOD BY AUTOMATED COUNT: 12.8 % (ref 11.5–14.5)
EST. AVERAGE GLUCOSE BLD GHB EST-MCNC: 243 MG/DL
GLUCOSE SERPL-MCNC: 181 MG/DL (ref 74–99)
HBA1C MFR BLD: 10.1 %
HCT VFR BLD AUTO: 51.1 % (ref 41–52)
HGB BLD-MCNC: 17.4 G/DL (ref 13.5–17.5)
IMM GRANULOCYTES # BLD AUTO: 0.04 X10*3/UL (ref 0–0.7)
IMM GRANULOCYTES NFR BLD AUTO: 0.3 % (ref 0–0.9)
LYMPHOCYTES # BLD AUTO: 3.85 X10*3/UL (ref 1.2–4.8)
LYMPHOCYTES NFR BLD AUTO: 27.4 %
MCH RBC QN AUTO: 30.9 PG (ref 26–34)
MCHC RBC AUTO-ENTMCNC: 34.1 G/DL (ref 32–36)
MCV RBC AUTO: 91 FL (ref 80–100)
MONOCYTES # BLD AUTO: 0.66 X10*3/UL (ref 0.1–1)
MONOCYTES NFR BLD AUTO: 4.7 %
NEUTROPHILS # BLD AUTO: 9.16 X10*3/UL (ref 1.2–7.7)
NEUTROPHILS NFR BLD AUTO: 65.1 %
NRBC BLD-RTO: 0 /100 WBCS (ref 0–0)
PLATELET # BLD AUTO: 400 X10*3/UL (ref 150–450)
POTASSIUM SERPL-SCNC: 4.7 MMOL/L (ref 3.5–5.3)
PROT SERPL-MCNC: 8 G/DL (ref 6.4–8.2)
RBC # BLD AUTO: 5.64 X10*6/UL (ref 4.5–5.9)
SODIUM SERPL-SCNC: 138 MMOL/L (ref 136–145)
TSH SERPL-ACNC: 1.28 MIU/L (ref 0.44–3.98)
VIT B12 SERPL-MCNC: 630 PG/ML (ref 211–911)
WBC # BLD AUTO: 14.1 X10*3/UL (ref 4.4–11.3)

## 2024-04-28 PROBLEM — M62.830 MUSCLE SPASM OF BACK: Status: RESOLVED | Noted: 2023-03-23 | Resolved: 2024-04-28

## 2024-04-28 NOTE — ASSESSMENT & PLAN NOTE
Diabetes mellitus Type II, under poor control.  Discussed general issues about diabetes pathophysiology and management.  Counseling at today's visit: discussed the need for weight loss, focused on the need for regular aerobic exercise, focused on the need to adhere to the prescribed ADA diet, discussed the advantages of a diet low in carbohydrates, and reminded to check sugars regularly and to bring readings in at the time of the next visit.  Educational material distributed.  Addressed ADA diet.  Suggested low cholesterol diet.  Encouraged aerobic exercise.

## 2024-04-28 NOTE — RESULT ENCOUNTER NOTE
Calcium levels are elevated. Needs to push fluids and recheck a BMP and PTH level  WBCs are elevated - it can be from the sinus infection he has. Would like him to start Zpak as directed. Then will need to get a CBC with diff in 10 days  Please add auric acid to the blood draw. Would like to see if he can stop the allopurinol  Also Vit D is low would like him to start Vit D 2000 I.U daily

## 2024-04-28 NOTE — ASSESSMENT & PLAN NOTE
Diabetes mellitus Type II, under poor control.  Discussed general issues about diabetes pathophysiology and management.  Counseling at today's visit: discussed the need for weight loss, focused on the need for regular aerobic exercise, focused on the need to adhere to the prescribed ADA diet, discussed the advantages of a diet low in carbohydrates, and reminded to check sugars regularly and to bring readings in at the time of the next visit.  Educational material distributed.  Addressed ADA diet.  Suggested low cholesterol diet.  Encouraged aerobic exercise.     Substance misuse

## 2024-04-28 NOTE — ASSESSMENT & PLAN NOTE
Stable on current medications  Continue meds and exercise.     Tips on getting through depression  DO:  Pace yourself.   Get involved in activities that make you feel good or feel like you've achieved something.   Avoid drugs and alcohol. Both make depression worse. Both can cause dangerous side effects with antidepressant medicines.   Exercise often to make yourself feel better. Physical activity seems to cause a chemical reaction in the body that can improve your mood. Exercising 4 to 6 times a week for at least 30 minutes each time is a good goal. But even less activity can be helpful.   Eat balanced meals and healthful foods.   Get enough sleep.   Take your medicine and/or go to counseling as often as your doctor recommends. Your medicine won't work if you only take it once in a while.   Set small goals for yourself, because you may have less energy.   Encourage yourself.   Get as much information as you can about depression and how to treat it.   Call your doctor or the local suicide crisis center right away if you start thinking about suicide.  DON'T:  Don't isolate yourself. Stay in touch with your loved ones and friends, your [employer] advisor, and your family doctor.   Don't believe negative thoughts you may have, such as blaming yourself or expecting to fail. This thinking is part of depression. These thoughts will go away as your depression lifts.   Don't blame yourself for your depression. You didn't cause it.   Don't make major life decisions (for example, about separation or divorce). You may not be thinking clearly while you are depressed, so the decisions you make at this time may not be the best ones for you. If you must make a big decision, ask someone you trust to help you.   Don't expect to do everything you normally can. Set a realistic schedule.   Don't get discouraged. It will take time for your depression to lift fully. Be patient with yourself.   Don't give up.

## 2024-04-29 RX ORDER — AZITHROMYCIN 250 MG/1
TABLET, FILM COATED ORAL DAILY
Qty: 6 TABLET | Refills: 0 | Status: SHIPPED | OUTPATIENT
Start: 2024-04-29 | End: 2024-05-04

## 2024-05-08 ENCOUNTER — PATIENT OUTREACH (OUTPATIENT)
Dept: PRIMARY CARE | Facility: CLINIC | Age: 64
End: 2024-05-08
Payer: COMMERCIAL

## 2024-05-08 DIAGNOSIS — E11.40 TYPE 2 DIABETES MELLITUS WITH DIABETIC NEUROPATHY, WITH LONG-TERM CURRENT USE OF INSULIN (MULTI): ICD-10-CM

## 2024-05-08 DIAGNOSIS — Z79.4 TYPE 2 DIABETES MELLITUS WITH DIABETIC NEUROPATHY, WITH LONG-TERM CURRENT USE OF INSULIN (MULTI): ICD-10-CM

## 2024-05-08 DIAGNOSIS — Z00.00 ENCOUNTER FOR ANNUAL GENERAL MEDICAL EXAMINATION WITHOUT ABNORMAL FINDINGS IN ADULT: ICD-10-CM

## 2024-05-08 NOTE — PROGRESS NOTES
Call placed to Cardiologist Dr. Gould's office to make an appt for patient. Appt made for Monday 6/3 at 1:30pm. He is located at [facility]. I will need to fax over most recent notes and labs per their request.   Also I called [facility] about making him an appt for his diabetic retinopathy. I was told to fax them the referral to [phone] and they will reach out to patient. I am going to ask them to call his wife to make an appt.   I tried to call patient and his phone went straight to [place]. I left a message to please call me back.   I sent his wife an email with the appt information. I let her know that he did not answer my call again either.

## 2024-05-10 ENCOUNTER — TELEMEDICINE (OUTPATIENT)
Dept: PHARMACY | Facility: HOSPITAL | Age: 64
End: 2024-05-10
Payer: COMMERCIAL

## 2024-05-10 ENCOUNTER — PATIENT OUTREACH (OUTPATIENT)
Dept: PRIMARY CARE | Facility: CLINIC | Age: 64
End: 2024-05-10

## 2024-05-10 DIAGNOSIS — E11.40 TYPE 2 DIABETES MELLITUS WITH DIABETIC NEUROPATHY, WITH LONG-TERM CURRENT USE OF INSULIN (MULTI): ICD-10-CM

## 2024-05-10 DIAGNOSIS — I10 ESSENTIAL (PRIMARY) HYPERTENSION: ICD-10-CM

## 2024-05-10 DIAGNOSIS — Z79.4 TYPE 2 DIABETES MELLITUS WITH DIABETIC NEUROPATHY, WITH LONG-TERM CURRENT USE OF INSULIN (MULTI): ICD-10-CM

## 2024-05-10 RX ORDER — BLOOD-GLUCOSE SENSOR
EACH MISCELLANEOUS
Qty: 3 EACH | Refills: 3 | Status: SHIPPED | OUTPATIENT
Start: 2024-05-10

## 2024-05-10 RX ORDER — BLOOD-GLUCOSE,RECEIVER,CONT
EACH MISCELLANEOUS
Qty: 1 EACH | Refills: 0 | Status: SHIPPED | OUTPATIENT
Start: 2024-05-10

## 2024-05-10 NOTE — PROGRESS NOTES
Spoke to patient regarding his glucose levels. He talked to the Pharmacist today regarding his glucose levels. Patient has been using his Scotty and his sugars are much improved. He has not had readings in the last week over 183. I checked his [employer] account and his TIR is around 65%. I encouraged patient to keep up the good work. He is interested in a Dexcom because he said his insurance is now denying his Scotty sensors. He asked me to send this in today but his PCP is not in the office. I asked the pharmacist if they could please send this in so patient could get it this week. He did say it may need a PA so I should watch out for that.   I talked to patient about his BP readings. He states they are borderline. When I asked for more information he gave me the following readings:    He states he has not taken his Diovan today so he will go do that now. I advised he take the previous dose he was on: the 320-25mg just based on how high his readings were. I am going to send this message to his PCP to review if she would like to make any other changes. Patient is scheduled with his Cardiologist for 6/3/24.    Discussed with patient the importance of exercise and to consider adding walks outside now that the weather is better. This would be beneficial for his mental health as well as physical health.

## 2024-05-10 NOTE — PROGRESS NOTES
"Patient is sent at the request of Alis Jones MD for my opinion regarding Type 2 diabetes.  My final recommendations will be communicated back to the requesting provider by way of shared medical record.    Subjective     HPI    Past Medical History:  He has a past medical history of Anemia, Personal history of other endocrine, nutritional and metabolic disease (09/17/2021), Personal history of other infectious and parasitic diseases (11/12/2021), and Personal history of other specified conditions (01/18/2022).    Past Surgical History:  He has no past surgical history on file.    Social History:  He reports that he has never smoked. He has never been exposed to tobacco smoke. He has never used smokeless tobacco. He reports that he does not currently use alcohol. He reports that he does not use drugs.    Family History:  Family History   Problem Relation Name Age of Onset    Other (cardiac disorder) Father      Other (cerebrovascular accident) Father      Cancer Father      Thyroid disease Other         Allergies:  Patient has no known allergies.    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    Hypoglycemia frequency: none, lowest at 102  Hypoglycemia awareness: Yes     Adverse Effects:   none     Objective     Last Recorded Vitals:  BP Readings from Last 6 Encounters:   04/25/24 93/66   01/17/24 110/72   11/09/23 140/74   08/10/23 126/86   03/27/23 124/70   01/23/23 138/74        Wt Readings from Last 6 Encounters:   04/25/24 147 kg (324 lb 4 oz)   01/17/24 148 kg (327 lb 2 oz)   11/09/23 (!) 152 kg (335 lb 6 oz)   08/10/23 (!) 157 kg (345 lb 12.8 oz)   03/27/23 (!) 165 kg (364 lb 6.4 oz)   01/23/23 (!) 168 kg (370 lb)     Diabetes Pharmacotherapy:  Xigduo 5-1000 BID  Tresiba " "U-200 60 units BID  Novolog U-100 36 units TID WM  Mounjaro 15 mg (newly increased) weekly    Primary/Secondary Prevention   - Statin? Yes  - ACE-I/ARB? Yes  - Aspirin? No    Pertinent PMH Review:  - PMH of Pancreatitis: No  - PMH of Retinopathy: No  - PMH of Urinary Tract Infections: No  - PMH of MTC: No    Lab Review  Lab Results   Component Value Date    BILITOT 0.7 04/25/2024    CALCIUM 11.6 (H) 04/25/2024    CO2 18 (L) 04/25/2024    CL 98 04/25/2024    CREATININE 1.26 04/25/2024    GLUCOSE 181 (H) 04/25/2024    ALKPHOS 71 04/25/2024    K 4.7 04/25/2024    PROT 8.0 04/25/2024     04/25/2024    AST 22 04/25/2024    ALT 23 04/25/2024    BUN 18 04/25/2024    ANIONGAP 27 (H) 04/25/2024    MG 2.54 (H) 01/17/2024    ALBUMIN 4.5 04/25/2024    GFRMALE 69 11/11/2022     Lab Results   Component Value Date    TRIG 287 (H) 01/17/2024    CHOL 244 (H) 01/17/2024    LDLCALC 138 (H) 01/17/2024    HDL 48.2 01/17/2024     Lab Results   Component Value Date    HGBA1C 10.1 (H) 04/25/2024    HGBA1C 10.6 (A) 04/25/2024    HGBA1C 14 (A) 01/17/2024     No components found for: \"UACR\"  The 10-year ASCVD risk score (Sury SMALL, et al., 2019) is: 14.3%    Values used to calculate the score:      Age: 63 years      Sex: Male      Is Non- : No      Diabetic: Yes      Tobacco smoker: No      Systolic Blood Pressure: 93 mmHg      Is BP treated: Yes      HDL Cholesterol: 48.2 mg/dL      Total Cholesterol: 244 mg/dL    Health Maintenance:   Foot Exam: performs regularly, due at next PCP appt, has lost a toenail recently, some neuropathy  Eye Exam: 4 months ago  Lipid Panel: updated  Influenza Immunization: updated  Pneumonia Immunization: Done, due at age 67    Drug Interactions:  None to consider at this time    Assessment/Plan   Problem List Items Addressed This Visit       Type 2 diabetes mellitus with diabetic neuropathy, with long-term current use of insulin (Multi)     Patients diabetes is improved, however " with most recent A1c of 10% (Goal < 7%).   Continue: BG monitoring to plan to adjust insulin, he reports PPG high of 182 this week  Compliance at present is estimated to be good. Efforts to improve compliance (if necessary) will be directed at regular blood sugar monitorin times daily.   Education Provided to Patient: Continue BG monitoring, consider exercise    Follow-up: I recommend diabetes care be  2 weeks .  PCP Follow-Up:     Addendum: following the visit the patient noted he did not have any Scotty sensors on hand to re-install, and went to refill at his pharmacy. He states that he was unable to get the med covered and would like to use a Dexcom sensor. This is acceptable. I will send the  and sensor kit orders, though I am concerned about coverage.     Continue all meds under the continuation of care with the referring provider and clinical pharmacy team.

## 2024-05-14 ENCOUNTER — PATIENT OUTREACH (OUTPATIENT)
Dept: PRIMARY CARE | Facility: CLINIC | Age: 64
End: 2024-05-14
Payer: COMMERCIAL

## 2024-05-14 DIAGNOSIS — E11.40 TYPE 2 DIABETES MELLITUS WITH DIABETIC NEUROPATHY, WITH LONG-TERM CURRENT USE OF INSULIN (MULTI): ICD-10-CM

## 2024-05-14 DIAGNOSIS — Z79.4 TYPE 2 DIABETES MELLITUS WITH DIABETIC NEUROPATHY, WITH LONG-TERM CURRENT USE OF INSULIN (MULTI): ICD-10-CM

## 2024-05-14 DIAGNOSIS — I10 ESSENTIAL (PRIMARY) HYPERTENSION: ICD-10-CM

## 2024-05-14 PROCEDURE — 99487 CPLX CHRNC CARE 1ST 60 MIN: CPT | Performed by: FAMILY MEDICINE

## 2024-05-24 ENCOUNTER — TELEMEDICINE (OUTPATIENT)
Dept: PHARMACY | Facility: HOSPITAL | Age: 64
End: 2024-05-24
Payer: COMMERCIAL

## 2024-05-24 DIAGNOSIS — Z79.4 TYPE 2 DIABETES MELLITUS WITH DIABETIC NEUROPATHY, WITH LONG-TERM CURRENT USE OF INSULIN (MULTI): ICD-10-CM

## 2024-05-24 DIAGNOSIS — E11.40 TYPE 2 DIABETES MELLITUS WITH DIABETIC NEUROPATHY, WITH LONG-TERM CURRENT USE OF INSULIN (MULTI): ICD-10-CM

## 2024-05-24 DIAGNOSIS — E11.65 TYPE 2 DIABETES MELLITUS WITH HYPERGLYCEMIA, WITH LONG-TERM CURRENT USE OF INSULIN (MULTI): Primary | ICD-10-CM

## 2024-05-24 DIAGNOSIS — Z79.4 TYPE 2 DIABETES MELLITUS WITH HYPERGLYCEMIA, WITH LONG-TERM CURRENT USE OF INSULIN (MULTI): Primary | ICD-10-CM

## 2024-05-24 NOTE — PROGRESS NOTES
"Patient is sent at the request of Alis Jones MD for my opinion regarding Type 2 diabetes.  My final recommendations will be communicated back to the requesting provider by way of shared medical record.    Subjective     HPI    Past Medical History:  He has a past medical history of Anemia, Personal history of other endocrine, nutritional and metabolic disease (09/17/2021), Personal history of other infectious and parasitic diseases (11/12/2021), and Personal history of other specified conditions (01/18/2022).    Past Surgical History:  He has no past surgical history on file.    Social History:  He reports that he has never smoked. He has never been exposed to tobacco smoke. He has never used smokeless tobacco. He reports that he does not currently use alcohol. He reports that he does not use drugs.    Family History:  Family History   Problem Relation Name Age of Onset    Other (cardiac disorder) Father      Other (cerebrovascular accident) Father      Cancer Father      Thyroid disease Other         Allergies:  Patient has no known allergies.    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  He is filling his Scotty through [employer] and has not started with Dexcom. OK to continue.       Hypoglycemia frequency: lows < 70 twice this week. Treated with hard candies. Instead, use half a glass of soda or chewable candies, then have protein and a slower absorbing carb.  Hypoglycemia awareness: Yes     Adverse Effects: none     Objective     Last Recorded Vitals:  BP Readings from Last 6 Encounters:   04/25/24 93/66   01/17/24 110/72   11/09/23 140/74   08/10/23 126/86   03/27/23 124/70   01/23/23 138/74        Wt Readings from Last 6 Encounters:   04/25/24 147 kg (324 lb 4 oz) " "  01/17/24 148 kg (327 lb 2 oz)   11/09/23 (!) 152 kg (335 lb 6 oz)   08/10/23 (!) 157 kg (345 lb 12.8 oz)   03/27/23 (!) 165 kg (364 lb 6.4 oz)   01/23/23 (!) 168 kg (370 lb)     Diabetes Pharmacotherapy:  Xigduo 5-1000 BID  Tresiba U-200 60 units BID  Novolog U-100 36 units TID WM  Mounjaro 15 mg (newly increased) weekly    Primary/Secondary Prevention   - Statin? Yes  - ACE-I/ARB? Yes  - Aspirin? No    Pertinent PMH Review:  - PMH of Pancreatitis: No  - PMH of Retinopathy: No  - PMH of Urinary Tract Infections: No  - PMH of MTC: No    Lab Review  Lab Results   Component Value Date    BILITOT 0.7 04/25/2024    CALCIUM 11.6 (H) 04/25/2024    CO2 18 (L) 04/25/2024    CL 98 04/25/2024    CREATININE 1.26 04/25/2024    GLUCOSE 181 (H) 04/25/2024    ALKPHOS 71 04/25/2024    K 4.7 04/25/2024    PROT 8.0 04/25/2024     04/25/2024    AST 22 04/25/2024    ALT 23 04/25/2024    BUN 18 04/25/2024    ANIONGAP 27 (H) 04/25/2024    MG 2.54 (H) 01/17/2024    ALBUMIN 4.5 04/25/2024    GFRMALE 69 11/11/2022     Lab Results   Component Value Date    TRIG 287 (H) 01/17/2024    CHOL 244 (H) 01/17/2024    LDLCALC 138 (H) 01/17/2024    HDL 48.2 01/17/2024     Lab Results   Component Value Date    HGBA1C 10.1 (H) 04/25/2024    HGBA1C 10.6 (A) 04/25/2024    HGBA1C 14 (A) 01/17/2024     No components found for: \"UACR\"  The 10-year ASCVD risk score (Sury SMALL, et al., 2019) is: 14.3%    Values used to calculate the score:      Age: 63 years      Sex: Male      Is Non- : No      Diabetic: Yes      Tobacco smoker: No      Systolic Blood Pressure: 93 mmHg      Is BP treated: Yes      HDL Cholesterol: 48.2 mg/dL      Total Cholesterol: 244 mg/dL    Health Maintenance:   Foot Exam: performs regularly, due at next PCP appt, has lost a toenail recently, some neuropathy  Eye Exam: 4 months ago  Lipid Panel: updated  Influenza Immunization: updated  Pneumonia Immunization: Done, due at age 67    Drug Interactions:  None " to consider at this time    Assessment/Plan   Problem List Items Addressed This Visit       Diabetes mellitus with hyperglycemia, with long-term current use of insulin (Multi) - Primary     Patients diabetes is improved, however with most recent A1c of 10% (Goal < 7%).   Continue: BG monitoring to plan to adjust insulin, he reports PPG high of 185 this week, though time in range is 68%, and 5% of readings above 240.   Reminded to adhere to mealtime insulin- taking mealtime insulin at most 15 minutes before meals instead of after to allow the body to more effectively process BG.   Goal to increase time in range to > 80 with no readings above 240  Atorvastatin was started > 3 months ago with elevated LDL in January. Goal LDL < 100. Recheck LDL with next labs if permitted. Increase to 80 mg atorvastatin if elevated.   Compliance at present is estimated to be good. Efforts to improve compliance will be directed at regular blood sugar monitorin times daily.   Education Provided to Patient: Continue BG monitoring, consider exercise  Follow-up: I recommend diabetes care be  2 weeks .  @ 11.   PCP Follow-Up:     Wilfrido Mckeon, PharmD    Continue all meds under the continuation of care with the referring provider and clinical pharmacy team.

## 2024-06-03 ENCOUNTER — TELEPHONE (OUTPATIENT)
Dept: PRIMARY CARE | Facility: CLINIC | Age: 64
End: 2024-06-03

## 2024-06-03 ENCOUNTER — OFFICE VISIT (OUTPATIENT)
Dept: PRIMARY CARE | Facility: CLINIC | Age: 64
End: 2024-06-03
Payer: COMMERCIAL

## 2024-06-03 VITALS
HEART RATE: 91 BPM | BODY MASS INDEX: 44.32 KG/M2 | SYSTOLIC BLOOD PRESSURE: 99 MMHG | OXYGEN SATURATION: 94 % | WEIGHT: 315 LBS | DIASTOLIC BLOOD PRESSURE: 67 MMHG

## 2024-06-03 DIAGNOSIS — B35.1 ONYCHOMYCOSIS OF TOENAIL: ICD-10-CM

## 2024-06-03 DIAGNOSIS — E11.40 TYPE 2 DIABETES MELLITUS WITH DIABETIC NEUROPATHY, WITH LONG-TERM CURRENT USE OF INSULIN (MULTI): ICD-10-CM

## 2024-06-03 DIAGNOSIS — E78.5 HYPERLIPIDEMIA ASSOCIATED WITH TYPE 2 DIABETES MELLITUS (MULTI): ICD-10-CM

## 2024-06-03 DIAGNOSIS — E11.65 TYPE 2 DIABETES MELLITUS WITH HYPERGLYCEMIA, WITH LONG-TERM CURRENT USE OF INSULIN (MULTI): Primary | ICD-10-CM

## 2024-06-03 DIAGNOSIS — L97.509 FOOT ULCER DUE TO SECONDARY DM (MULTI): ICD-10-CM

## 2024-06-03 DIAGNOSIS — Z79.4 TYPE 2 DIABETES MELLITUS WITH HYPERGLYCEMIA, WITH LONG-TERM CURRENT USE OF INSULIN (MULTI): Primary | ICD-10-CM

## 2024-06-03 DIAGNOSIS — E13.621 FOOT ULCER DUE TO SECONDARY DM (MULTI): ICD-10-CM

## 2024-06-03 DIAGNOSIS — Z79.4 TYPE 2 DIABETES MELLITUS WITH DIABETIC NEUROPATHY, WITH LONG-TERM CURRENT USE OF INSULIN (MULTI): ICD-10-CM

## 2024-06-03 DIAGNOSIS — R73.09 ABNORMAL GLUCOSE: ICD-10-CM

## 2024-06-03 DIAGNOSIS — E11.69 HYPERLIPIDEMIA ASSOCIATED WITH TYPE 2 DIABETES MELLITUS (MULTI): ICD-10-CM

## 2024-06-03 DIAGNOSIS — I10 ESSENTIAL (PRIMARY) HYPERTENSION: ICD-10-CM

## 2024-06-03 DIAGNOSIS — I73.9 PVD (PERIPHERAL VASCULAR DISEASE) WITH CLAUDICATION (CMS-HCC): ICD-10-CM

## 2024-06-03 DIAGNOSIS — K21.9 GASTROESOPHAGEAL REFLUX DISEASE WITHOUT ESOPHAGITIS: ICD-10-CM

## 2024-06-03 PROBLEM — E53.8 COBALAMIN DEFICIENCY: Status: ACTIVE | Noted: 2024-01-17

## 2024-06-03 LAB — POC FINGERSTICK BLOOD GLUCOSE: 324 MG/DL (ref 70–100)

## 2024-06-03 PROCEDURE — 3078F DIAST BP <80 MM HG: CPT | Performed by: FAMILY MEDICINE

## 2024-06-03 PROCEDURE — 3046F HEMOGLOBIN A1C LEVEL >9.0%: CPT | Performed by: FAMILY MEDICINE

## 2024-06-03 PROCEDURE — 99215 OFFICE O/P EST HI 40 MIN: CPT | Performed by: FAMILY MEDICINE

## 2024-06-03 PROCEDURE — 3050F LDL-C >= 130 MG/DL: CPT | Performed by: FAMILY MEDICINE

## 2024-06-03 PROCEDURE — 3074F SYST BP LT 130 MM HG: CPT | Performed by: FAMILY MEDICINE

## 2024-06-03 PROCEDURE — 1036F TOBACCO NON-USER: CPT | Performed by: FAMILY MEDICINE

## 2024-06-03 PROCEDURE — 82962 GLUCOSE BLOOD TEST: CPT | Performed by: FAMILY MEDICINE

## 2024-06-03 PROCEDURE — 4010F ACE/ARB THERAPY RXD/TAKEN: CPT | Performed by: FAMILY MEDICINE

## 2024-06-03 RX ORDER — TIRZEPATIDE 12.5 MG/.5ML
12.5 INJECTION, SOLUTION SUBCUTANEOUS
Qty: 2 ML | Refills: 0 | Status: SHIPPED | OUTPATIENT
Start: 2024-06-09

## 2024-06-03 RX ORDER — ACETAMINOPHEN 500 MG
4000 TABLET ORAL DAILY
COMMUNITY

## 2024-06-03 RX ORDER — VALSARTAN 320 MG/1
320 TABLET ORAL DAILY
Qty: 90 TABLET | Refills: 0 | Status: SHIPPED | OUTPATIENT
Start: 2024-06-03

## 2024-06-03 RX ORDER — VALSARTAN AND HYDROCHLOROTHIAZIDE 320; 25 MG/1; MG/1
1 TABLET, FILM COATED ORAL DAILY
COMMUNITY
End: 2024-06-03 | Stop reason: ALTCHOICE

## 2024-06-03 ASSESSMENT — ENCOUNTER SYMPTOMS
FEVER: 0
BACK PAIN: 1
SORE THROAT: 0
AGITATION: 0
COLOR CHANGE: 1
NAUSEA: 0
COUGH: 0
BRUISES/BLEEDS EASILY: 0
EYE DISCHARGE: 0
ACTIVITY CHANGE: 0
POLYPHAGIA: 0
FREQUENCY: 0
DIAPHORESIS: 0
WHEEZING: 0
APPETITE CHANGE: 0
DIZZINESS: 0
SINUS PRESSURE: 0
CHILLS: 0
SHORTNESS OF BREATH: 0
SLEEP DISTURBANCE: 0
FATIGUE: 0
DIARRHEA: 0
HEMATURIA: 0
CONSTIPATION: 0
WOUND: 1
EYE ITCHING: 0
ARTHRALGIAS: 1
FLANK PAIN: 0
NERVOUS/ANXIOUS: 0
EYE REDNESS: 0
DYSURIA: 0
MYALGIAS: 1
UNEXPECTED WEIGHT CHANGE: 0
ABDOMINAL PAIN: 0
JOINT SWELLING: 0
BLOOD IN STOOL: 0
TROUBLE SWALLOWING: 0
PALPITATIONS: 0
NECK STIFFNESS: 0
CHEST TIGHTNESS: 0
LIGHT-HEADEDNESS: 1
RHINORRHEA: 0
ADENOPATHY: 0
EYE PAIN: 0
VOICE CHANGE: 0
VOMITING: 0
FACIAL SWELLING: 0
SINUS PAIN: 0
POLYDIPSIA: 0

## 2024-06-03 NOTE — TELEPHONE ENCOUNTER
[facility] pharmacy called saying the Mounjaro was sent to not fill before 6/9. Pt is in need of it now. Okay to switch?

## 2024-06-03 NOTE — PROGRESS NOTES
Subjective   Patient ID: Reza Red is a 63 y.o. male who presents for Follow-up.  Today he is accompanied by accompanied by spouse.     HPI  Subjective:   Reza Red is a 63 y.o. male with hypertension. However, his blood pressure has been running low since he doubled his dose. He is also smoking weed, which has decreased his pain from a 4/10 to a 1/10. Would like a new referral to a podiatrist because his current podiatrist moved. His blood sugars have been fluctuating, however he is a poor historian and did not bring in his numbers.   Current Outpatient Medications   Medication Sig Dispense Refill    allopurinol (Zyloprim) 300 mg tablet TAKE ONE TABLET BY MOUTH ONE TIME DAILY 90 tablet 0    atorvastatin (Lipitor) 40 mg tablet Take 1 tablet (40 mg) by mouth once daily. 90 tablet 1    blood sugar diagnostic (Accu-Chek Dorie Plus test strp) strip 1 strip 3 times a day. 100 strip 0    blood-glucose meter misc Use to check glucose levels 3 times a day as directed 1 each 0    cholecalciferol (Vitamin D3) 50 mcg (2,000 unit) capsule Take 2 capsules (100 mcg) by mouth once daily.      dapaglifloz propaned-metformin (Xigduo XR) 5-1,000 mg Take 1 tablet by mouth 2 times a day before meals. 180 tablet 0    Dexcom G7  misc Use as instructed to test blood glucose at the beginning of the day, and one hour after insulin administration with meals 1 each 0    Dexcom G7 Sensor device Use as instructed to test blood glucose at the beginning of the day, and one hour after insulin administration with meals 3 each 3    DULoxetine (Cymbalta) 60 mg DR capsule TAKE ONE CAPSULE BY MOUTH ONE TIME DAILY 90 capsule 0    gabapentin (Neurontin) 600 mg tablet TAKE ONE TABLET BY MOUTH TWICE DAILY 180 tablet 0    insulin aspart (NovoLOG Flexpen U-100 Insulin) 100 unit/mL (3 mL) pen Inject 36 Units under the skin 3 times a day with meals. 105 mL 1    insulin degludec (Tresiba FlexTouch U-200) 200 unit/mL (3 mL) injection Inject 60 Units  "under the skin 2 times a day. 63 mL 1    lancets misc Use to check glucose levels 3 times a day as directed 100 each 0    metoprolol tartrate (Lopressor) 100 mg tablet Take 1 tablet (100 mg) by mouth once daily. 90 tablet 0    pantoprazole (ProtoNix) 40 mg EC tablet Take 1 tablet (40 mg) by mouth 2 times a day. 180 tablet 1    pen needle, diabetic (BD Ultra-Fine Micro Pen Needle) 32 gauge x 1/4\" needle USE 1 NEEDLE 5 TIMES A DAY WITH NOVOLOG AND TRESIBA 500 each 0    [START ON 6/9/2024] tirzepatide (Mounjaro) 12.5 mg/0.5 mL pen injector Inject 12.5 mg under the skin 1 (one) time per week. 2 mL 0    tirzepatide (Mounjaro) 15 mg/0.5 mL pen injector Inject 15 mg under the skin 1 (one) time per week. (Patient not taking: Reported on 6/3/2024) 6 mL 0    valsartan (Diovan) 320 mg tablet Take 1 tablet (320 mg) by mouth once daily. 90 tablet 0     No current facility-administered medications for this visit.      Hypertension ROS: taking medications as instructed, no medication side effects noted, no TIA's, no chest pain on exertion, no dyspnea on exertion, noting swelling of ankles, no orthopnea or paroxysmal nocturnal dyspnea, does note some dizziness when arising, no palpitations, and no intermittent claudication symptoms.   New concerns: low bp and blood glucose is low at times but high at other times.     Objective:   BP 99/67   Pulse 91   Wt 147 kg (324 lb 8 oz)   SpO2 94%   BMI 44.32 kg/m²      Review of Systems   Constitutional:  Negative for activity change, appetite change, chills, diaphoresis, fatigue, fever and unexpected weight change.   HENT:  Negative for congestion, dental problem, drooling, ear discharge, ear pain, facial swelling, hearing loss, nosebleeds, postnasal drip, rhinorrhea, sinus pressure, sinus pain, sneezing, sore throat, tinnitus, trouble swallowing and voice change.    Eyes:  Negative for pain, discharge, redness, itching and visual disturbance.   Respiratory:  Negative for cough, chest " tightness, shortness of breath and wheezing.    Cardiovascular:  Negative for chest pain, palpitations and leg swelling.   Gastrointestinal:  Negative for abdominal pain, blood in stool, constipation, diarrhea, nausea and vomiting.   Endocrine: Negative for cold intolerance, heat intolerance, polydipsia, polyphagia and polyuria.   Genitourinary:  Negative for decreased urine volume, dysuria, flank pain, frequency, hematuria and urgency.   Musculoskeletal:  Positive for arthralgias, back pain and myalgias. Negative for gait problem, joint swelling and neck stiffness.   Skin:  Positive for color change and wound. Negative for pallor and rash.   Neurological:  Positive for light-headedness. Negative for dizziness.   Hematological:  Negative for adenopathy. Does not bruise/bleed easily.   Psychiatric/Behavioral:  Negative for agitation, behavioral problems and sleep disturbance. The patient is not nervous/anxious.        Objective   BP 99/67   Pulse 91   Wt 147 kg (324 lb 8 oz)   SpO2 94%   BMI 44.32 kg/m²   BSA: 2.73 meters squared  Growth percentiles: Facility age limit for growth %chandrakant is 20 years. Facility age limit for growth %chandrakant is 20 years.     Physical Exam  Vitals and nursing note reviewed. Exam conducted with a chaperone present.   Constitutional:       General: He is not in acute distress.     Appearance: Normal appearance. He is obese. He is not ill-appearing, toxic-appearing or diaphoretic.   HENT:      Head: Normocephalic.      Right Ear: Tympanic membrane, ear canal and external ear normal. There is no impacted cerumen.      Left Ear: Tympanic membrane, ear canal and external ear normal. There is no impacted cerumen.      Nose: Nose normal. No congestion or rhinorrhea.      Mouth/Throat:      Mouth: Mucous membranes are moist.      Pharynx: Oropharynx is clear. No oropharyngeal exudate or posterior oropharyngeal erythema.   Eyes:      General: No scleral icterus.        Right eye: No discharge.          Left eye: No discharge.      Extraocular Movements: Extraocular movements intact.      Conjunctiva/sclera: Conjunctivae normal.      Pupils: Pupils are equal, round, and reactive to light.   Neck:      Vascular: No carotid bruit.   Cardiovascular:      Rate and Rhythm: Normal rate and regular rhythm.      Pulses: Normal pulses.      Heart sounds: Normal heart sounds. No murmur heard.     No friction rub. No gallop.   Pulmonary:      Effort: Pulmonary effort is normal. No respiratory distress.      Breath sounds: Normal breath sounds. No wheezing or rhonchi.   Chest:      Chest wall: No tenderness.   Abdominal:      General: Abdomen is flat. Bowel sounds are normal. There is no distension.      Palpations: Abdomen is soft. There is no mass.      Tenderness: There is no abdominal tenderness. There is no right CVA tenderness, left CVA tenderness, guarding or rebound.      Hernia: No hernia is present.   Musculoskeletal:         General: Signs of injury present. No swelling or tenderness. Normal range of motion.      Cervical back: Normal range of motion and neck supple. No rigidity or tenderness.      Right lower leg: Edema present.      Left lower leg: Edema present.        Feet:    Feet:      Right foot:      Skin integrity: Skin breakdown, erythema (there is a purplish hue from the toes extending to above his mid calf) and dry skin present.      Toenail Condition: Right toenails are abnormally thick and long. Fungal disease present.     Left foot:      Skin integrity: Skin breakdown, erythema (there is a purplish hue from the toes extending to above his mid calf) and dry skin present.      Toenail Condition: Left toenails are abnormally thick and long. Fungal disease present.  Lymphadenopathy:      Cervical: No cervical adenopathy.   Skin:     General: Skin is warm and dry.      Capillary Refill: Capillary refill takes 2 to 3 seconds.      Coloration: Skin is not pale.      Findings: Erythema and lesion  present. No bruising or rash.   Neurological:      General: No focal deficit present.      Mental Status: He is alert and oriented to person, place, and time.      Cranial Nerves: No cranial nerve deficit.      Sensory: No sensory deficit.      Coordination: Coordination normal.      Gait: Gait normal.      Deep Tendon Reflexes: Reflexes normal.   Psychiatric:         Mood and Affect: Mood normal.         Behavior: Behavior normal.         Thought Content: Thought content normal.         Judgment: Judgment normal.         Assessment/Plan   Problem List Items Addressed This Visit             ICD-10-CM    Gastroesophageal reflux disease without esophagitis K21.9    Essential (primary) hypertension I10    Relevant Medications    valsartan (Diovan) 320 mg tablet    Hyperlipidemia associated with type 2 diabetes mellitus (Multi) E11.69, E78.5    Onychomycosis of toenail B35.1    Relevant Orders    Referral to Podiatry    Diabetes mellitus with hyperglycemia, with long-term current use of insulin (Multi) - Primary E11.65, Z79.4    Relevant Medications    tirzepatide (Mounjaro) 12.5 mg/0.5 mL pen injector (Start on 6/9/2024)    Type 2 diabetes mellitus with diabetic neuropathy, with long-term current use of insulin (Multi) E11.40, Z79.4    PVD (peripheral vascular disease) with claudication (CMS-HCC) I73.9    Relevant Orders    Vascular US lower extremity arterial duplex bilateral    Foot ulcer due to secondary DM (Multi) E13.621, L97.509    Relevant Orders    Vascular US lower extremity arterial duplex bilateral     Other Visit Diagnoses         Codes    Abnormal glucose     R73.09    Relevant Orders    POCT fingerstick glucose manually resulted (Completed)              Current Outpatient Medications   Medication Sig Dispense Refill    allopurinol (Zyloprim) 300 mg tablet TAKE ONE TABLET BY MOUTH ONE TIME DAILY 90 tablet 0    atorvastatin (Lipitor) 40 mg tablet Take 1 tablet (40 mg) by mouth once daily. 90 tablet 1     "blood sugar diagnostic (Accu-Chek Dorie Plus test strp) strip 1 strip 3 times a day. 100 strip 0    blood-glucose meter misc Use to check glucose levels 3 times a day as directed 1 each 0    cholecalciferol (Vitamin D3) 50 mcg (2,000 unit) capsule Take 2 capsules (100 mcg) by mouth once daily.      dapaglifloz propaned-metformin (Xigduo XR) 5-1,000 mg Take 1 tablet by mouth 2 times a day before meals. 180 tablet 0    Dexcom G7  misc Use as instructed to test blood glucose at the beginning of the day, and one hour after insulin administration with meals 1 each 0    Dexcom G7 Sensor device Use as instructed to test blood glucose at the beginning of the day, and one hour after insulin administration with meals 3 each 3    DULoxetine (Cymbalta) 60 mg DR capsule TAKE ONE CAPSULE BY MOUTH ONE TIME DAILY 90 capsule 0    gabapentin (Neurontin) 600 mg tablet TAKE ONE TABLET BY MOUTH TWICE DAILY 180 tablet 0    insulin aspart (NovoLOG Flexpen U-100 Insulin) 100 unit/mL (3 mL) pen Inject 36 Units under the skin 3 times a day with meals. 105 mL 1    insulin degludec (Tresiba FlexTouch U-200) 200 unit/mL (3 mL) injection Inject 60 Units under the skin 2 times a day. 63 mL 1    lancets misc Use to check glucose levels 3 times a day as directed 100 each 0    metoprolol tartrate (Lopressor) 100 mg tablet Take 1 tablet (100 mg) by mouth once daily. 90 tablet 0    pantoprazole (ProtoNix) 40 mg EC tablet Take 1 tablet (40 mg) by mouth 2 times a day. 180 tablet 1    pen needle, diabetic (BD Ultra-Fine Micro Pen Needle) 32 gauge x 1/4\" needle USE 1 NEEDLE 5 TIMES A DAY WITH NOVOLOG AND TRESIBA 500 each 0    [START ON 6/9/2024] tirzepatide (Mounjaro) 12.5 mg/0.5 mL pen injector Inject 12.5 mg under the skin 1 (one) time per week. 2 mL 0    tirzepatide (Mounjaro) 15 mg/0.5 mL pen injector Inject 15 mg under the skin 1 (one) time per week. (Patient not taking: Reported on 6/3/2024) 6 mL 0    valsartan (Diovan) 320 mg tablet Take 1 " tablet (320 mg) by mouth once daily. 90 tablet 0     No current facility-administered medications for this visit.     F/U in 4 weeks for htn and dm  Stable on current medications  Continue meds and exercise.   Hypertension Plan  Continue current treatment regimen.  On no meds for BP and needs none at this time.  Medication changes per orders.  Dietary sodium restriction.  Regular aerobic exercise.  Weight loss.  Reduce stress.  Patient Education: Reviewed risks of hypertension and principles of   treatment.  Counseling time: counseling time more than 50% of visit: 45 minutes.

## 2024-06-10 ENCOUNTER — PATIENT OUTREACH (OUTPATIENT)
Dept: PRIMARY CARE | Facility: CLINIC | Age: 64
End: 2024-06-10
Payer: COMMERCIAL

## 2024-06-10 DIAGNOSIS — Z79.4 TYPE 2 DIABETES MELLITUS WITH HYPERGLYCEMIA, WITH LONG-TERM CURRENT USE OF INSULIN (MULTI): ICD-10-CM

## 2024-06-10 DIAGNOSIS — E11.65 TYPE 2 DIABETES MELLITUS WITH HYPERGLYCEMIA, WITH LONG-TERM CURRENT USE OF INSULIN (MULTI): ICD-10-CM

## 2024-06-10 DIAGNOSIS — I10 ESSENTIAL (PRIMARY) HYPERTENSION: ICD-10-CM

## 2024-06-10 NOTE — PROGRESS NOTES
Chart review completed prior to CCM outreach. Call attempted to patient. No answer. Left message to call back.

## 2024-06-20 ENCOUNTER — PATIENT OUTREACH (OUTPATIENT)
Dept: PRIMARY CARE | Facility: CLINIC | Age: 64
End: 2024-06-20
Payer: COMMERCIAL

## 2024-06-20 DIAGNOSIS — Z79.4 TYPE 2 DIABETES MELLITUS WITH HYPERGLYCEMIA, WITH LONG-TERM CURRENT USE OF INSULIN (MULTI): ICD-10-CM

## 2024-06-20 DIAGNOSIS — E11.65 TYPE 2 DIABETES MELLITUS WITH HYPERGLYCEMIA, WITH LONG-TERM CURRENT USE OF INSULIN (MULTI): ICD-10-CM

## 2024-06-20 DIAGNOSIS — I10 ESSENTIAL (PRIMARY) HYPERTENSION: ICD-10-CM

## 2024-06-20 NOTE — PROGRESS NOTES
Chart review completed prior to CCM outreach. Call attempted to patient. No answer. Call attempted to wife Laura, no answer. Left a message that he has not returned my calls or the pharmacist calls. I ask that they please call me back.

## 2024-06-25 ENCOUNTER — APPOINTMENT (OUTPATIENT)
Dept: PHARMACY | Facility: HOSPITAL | Age: 64
End: 2024-06-25
Payer: COMMERCIAL

## 2024-06-25 DIAGNOSIS — E11.65 TYPE 2 DIABETES MELLITUS WITH HYPERGLYCEMIA, WITH LONG-TERM CURRENT USE OF INSULIN (MULTI): ICD-10-CM

## 2024-06-25 DIAGNOSIS — E11.40 TYPE 2 DIABETES MELLITUS WITH DIABETIC NEUROPATHY, WITH LONG-TERM CURRENT USE OF INSULIN (MULTI): Primary | ICD-10-CM

## 2024-06-25 DIAGNOSIS — Z79.4 TYPE 2 DIABETES MELLITUS WITH DIABETIC NEUROPATHY, WITH LONG-TERM CURRENT USE OF INSULIN (MULTI): Primary | ICD-10-CM

## 2024-06-25 DIAGNOSIS — Z79.4 TYPE 2 DIABETES MELLITUS WITH HYPERGLYCEMIA, WITH LONG-TERM CURRENT USE OF INSULIN (MULTI): ICD-10-CM

## 2024-06-25 RX ORDER — FLASH GLUCOSE SENSOR
1 KIT MISCELLANEOUS
COMMUNITY
End: 2024-06-25 | Stop reason: SDUPTHER

## 2024-06-25 RX ORDER — FLASH GLUCOSE SENSOR
1 KIT MISCELLANEOUS
Qty: 2 EACH | Refills: 11 | Status: SHIPPED | OUTPATIENT
Start: 2024-06-25

## 2024-06-25 NOTE — PROGRESS NOTES
"Patient is sent at the request of Alis Jones MD for my opinion regarding Type 2 diabetes.  My final recommendations will be communicated back to the requesting provider by way of shared medical record.    Subjective     HPI    Past Medical History:  He has a past medical history of Anemia, Personal history of other endocrine, nutritional and metabolic disease (09/17/2021), Personal history of other infectious and parasitic diseases (11/12/2021), and Personal history of other specified conditions (01/18/2022).    Past Surgical History:  He has no past surgical history on file.    Social History:  He reports that he has never smoked. He has never been exposed to tobacco smoke. He has never used smokeless tobacco. He reports that he does not currently use alcohol. He reports that he does not use drugs.    Family History:  Family History   Problem Relation Name Age of Onset    Other (cardiac disorder) Father      Other (cerebrovascular accident) Father      Cancer Father      Thyroid disease Other         Allergies:  Patient has no known allergies.    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  He is filling his Scotty through [employer] and has not started with Dexcom. OK to continue.     Hypoglycemia frequency: Some symptoms of lows this week in the morning. Treats with half a glass of soda or chewable candies, then have protein and a slower absorbing carb.   Hypoglycemia awareness: Yes     Adverse Effects: none     Objective     Last Recorded Vitals:  BP Readings from Last 6 Encounters:   06/03/24 99/67   04/25/24 93/66   01/17/24 110/72   11/09/23 140/74   08/10/23 126/86   03/27/23 124/70        Wt Readings from Last 6 Encounters:   06/03/24 147 kg (324 lb 8 oz)   04/25/24 147 kg " "(324 lb 4 oz)   01/17/24 148 kg (327 lb 2 oz)   11/09/23 (!) 152 kg (335 lb 6 oz)   08/10/23 (!) 157 kg (345 lb 12.8 oz)   03/27/23 (!) 165 kg (364 lb 6.4 oz)     Diabetes Pharmacotherapy:  Xigduo 5-1000 BID  Tresiba U-200 60 units BID  Novolog U-100 36 units TID WM  Mounjaro 15 mg (newly increased) weekly    Primary/Secondary Prevention   - Statin? Yes  - ACE-I/ARB? Yes  - Aspirin? No    Pertinent PMH Review:  - PMH of Pancreatitis: No  - PMH of Retinopathy: No  - PMH of Urinary Tract Infections: No  - PMH of MTC: No    Lab Review  Lab Results   Component Value Date    BILITOT 0.7 04/25/2024    CALCIUM 11.6 (H) 04/25/2024    CO2 18 (L) 04/25/2024    CL 98 04/25/2024    CREATININE 1.26 04/25/2024    GLUCOSE 181 (H) 04/25/2024    ALKPHOS 71 04/25/2024    K 4.7 04/25/2024    PROT 8.0 04/25/2024     04/25/2024    AST 22 04/25/2024    ALT 23 04/25/2024    BUN 18 04/25/2024    ANIONGAP 27 (H) 04/25/2024    MG 2.54 (H) 01/17/2024    ALBUMIN 4.5 04/25/2024    GFRMALE 69 11/11/2022     Lab Results   Component Value Date    TRIG 287 (H) 01/17/2024    CHOL 244 (H) 01/17/2024    LDLCALC 138 (H) 01/17/2024    HDL 48.2 01/17/2024     Lab Results   Component Value Date    HGBA1C 10.1 (H) 04/25/2024    HGBA1C 10.6 (A) 04/25/2024    HGBA1C 14 (A) 01/17/2024     No components found for: \"UACR\"  The 10-year ASCVD risk score (Sury DK, et al., 2019) is: 15.8%    Values used to calculate the score:      Age: 63 years      Sex: Male      Is Non- : No      Diabetic: Yes      Tobacco smoker: No      Systolic Blood Pressure: 99 mmHg      Is BP treated: Yes      HDL Cholesterol: 48.2 mg/dL      Total Cholesterol: 244 mg/dL    Health Maintenance:   Foot Exam: performs regularly, due at next PCP appt, has lost a toenail recently, some neuropathy  Eye Exam: 4 months ago  Lipid Panel: updated  Influenza Immunization: updated  Pneumonia Immunization: Done, due at age 67    Drug Interactions:  None to consider at " this time    Assessment/Plan   Problem List Items Addressed This Visit       Type 2 diabetes mellitus with diabetic neuropathy, with long-term current use of insulin (Multi) - Primary     Patients diabetes is improved, however with most recent A1c of 10% (Goal < 7%).   Continue: BG monitoring to plan to adjust insulin.  Reminded to adhere to mealtime insulin- taking mealtime insulin at most 15 minutes before meals instead of after to allow the body to more effectively process BG.   Goal to increase time in range to > 80 with no readings above 240  Atorvastatin was started > 3 months ago with elevated LDL in January. Goal LDL < 100. Recheck LDL with next labs in July if permitted. Increase to 80 mg atorvastatin if elevated.   Compliance at present is estimated to be good. Efforts to improve compliance will be directed at regular blood sugar monitorin times daily.   Education Provided to Patient: Continue BG monitoring, consider exercise  Follow-up: I recommend diabetes care be  2 weeks . 24 @ 1400   PCP Follow-Up: 24    Wilfrido Mckeon, [place]    Continue all meds under the continuation of care with the referring provider and clinical pharmacy team.

## 2024-06-26 ENCOUNTER — PATIENT OUTREACH (OUTPATIENT)
Dept: PRIMARY CARE | Facility: CLINIC | Age: 64
End: 2024-06-26
Payer: COMMERCIAL

## 2024-06-26 DIAGNOSIS — Z79.4 TYPE 2 DIABETES MELLITUS WITH HYPERGLYCEMIA, WITH LONG-TERM CURRENT USE OF INSULIN (MULTI): ICD-10-CM

## 2024-06-26 DIAGNOSIS — E11.65 TYPE 2 DIABETES MELLITUS WITH HYPERGLYCEMIA, WITH LONG-TERM CURRENT USE OF INSULIN (MULTI): ICD-10-CM

## 2024-06-26 NOTE — PROGRESS NOTES
Chart review completed prior to CCM outreach. Call attempted to patient. No answer. Left message to call back.   Call placed to wife as well. No answer. Left message to call back.     Note from [facility] reviewed. Call took place yesterday.

## 2024-07-08 NOTE — PROGRESS NOTES
"Patient is sent at the request of Alis Jones MD for my opinion regarding Type 2 diabetes.  My final recommendations will be communicated back to the requesting provider by way of shared medical record.    Subjective     HPI    Past Medical History:  He has a past medical history of Anemia, Personal history of other endocrine, nutritional and metabolic disease (09/17/2021), Personal history of other infectious and parasitic diseases (11/12/2021), and Personal history of other specified conditions (01/18/2022).    Past Surgical History:  He has no past surgical history on file.    Social History:  He reports that he has never smoked. He has never been exposed to tobacco smoke. He has never used smokeless tobacco. He reports that he does not currently use alcohol. He reports that he does not use drugs.    Family History:  Family History   Problem Relation Name Age of Onset    Other (cardiac disorder) Father      Other (cerebrovascular accident) Father      Cancer Father      Thyroid disease Other         Allergies:  Patient has no known allergies.    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  He is filling his Scotty through [employer] and has not started with Dexcom. OK to continue.     Hypoglycemia frequency: Some symptoms of lows this week in the morning. Treats with half a glass of soda or chewable candies, then have protein and a slower absorbing carb.   Hypoglycemia awareness: Yes     Reports some cramping. Will focus on hydration.     Adverse Effects: none     Objective     Last Recorded Vitals:  BP Readings from Last 6 Encounters:   06/03/24 99/67   04/25/24 93/66   01/17/24 110/72   11/09/23 140/74   08/10/23 126/86   03/27/23 124/70        Wt Readings from Last 6 " "Encounters:   06/03/24 147 kg (324 lb 8 oz)   04/25/24 147 kg (324 lb 4 oz)   01/17/24 148 kg (327 lb 2 oz)   11/09/23 (!) 152 kg (335 lb 6 oz)   08/10/23 (!) 157 kg (345 lb 12.8 oz)   03/27/23 (!) 165 kg (364 lb 6.4 oz)     Diabetes Pharmacotherapy:  Xigduo 5-1000 BID  Tresiba U-200 60 units BID  Novolog U-100 36 units TID WM  Mounjaro 15 mg (newly increased) weekly    Primary/Secondary Prevention   - Statin? Yes  - ACE-I/ARB? Yes  - Aspirin? No    Pertinent PMH Review:  - PMH of Pancreatitis: No  - PMH of Retinopathy: No  - PMH of Urinary Tract Infections: No  - PMH of MTC: No    Lab Review  Lab Results   Component Value Date    BILITOT 0.7 04/25/2024    CALCIUM 11.6 (H) 04/25/2024    CO2 18 (L) 04/25/2024    CL 98 04/25/2024    CREATININE 1.26 04/25/2024    GLUCOSE 181 (H) 04/25/2024    ALKPHOS 71 04/25/2024    K 4.7 04/25/2024    PROT 8.0 04/25/2024     04/25/2024    AST 22 04/25/2024    ALT 23 04/25/2024    BUN 18 04/25/2024    ANIONGAP 27 (H) 04/25/2024    MG 2.54 (H) 01/17/2024    ALBUMIN 4.5 04/25/2024    GFRMALE 69 11/11/2022     Lab Results   Component Value Date    TRIG 287 (H) 01/17/2024    CHOL 244 (H) 01/17/2024    LDLCALC 138 (H) 01/17/2024    HDL 48.2 01/17/2024     Lab Results   Component Value Date    HGBA1C 10.1 (H) 04/25/2024    HGBA1C 10.6 (A) 04/25/2024    HGBA1C 14 (A) 01/17/2024     No components found for: \"UACR\"  The 10-year ASCVD risk score (Sury DK, et al., 2019) is: 15.8%    Values used to calculate the score:      Age: 63 years      Sex: Male      Is Non- : No      Diabetic: Yes      Tobacco smoker: No      Systolic Blood Pressure: 99 mmHg      Is BP treated: Yes      HDL Cholesterol: 48.2 mg/dL      Total Cholesterol: 244 mg/dL    Health Maintenance:   Foot Exam: performs regularly, due at next PCP appt, has lost a toenail recently, some neuropathy  Eye Exam: 4 months ago  Lipid Panel: updated  Influenza Immunization: updated  Pneumonia Immunization: " Done, due at age 67    Drug Interactions:  None to consider at this time    Assessment/Plan   Problem List Items Addressed This Visit       Diabetes mellitus with hyperglycemia, with long-term current use of insulin (Multi)    Type 2 diabetes mellitus with diabetic neuropathy, with long-term current use of insulin (Multi)     Patients diabetes is improved, however with most recent A1c of 10% (Goal < 7%).   Refill test strips- Scotty readings are unexpectedly high despite described adherence so need to verify with fingerstick reading  Continue: BG monitoring to plan to adjust insulin.  Reminded to adhere to mealtime insulin- taking mealtime insulin at most 15 minutes before meals instead of after to allow the body to more effectively process BG.   Goal to increase time in range to > 80 with no readings above 240  Atorvastatin was started > 3 months ago with elevated LDL in January. Goal LDL < 100. Recheck LDL with next labs in July if permitted. Increase to 80 mg atorvastatin if elevated.   Compliance at present is estimated to be good. Efforts to improve compliance will be directed at regular blood sugar monitorin times daily.   Education Provided to Patient: Continue BG monitoring, consider exercise  Follow-up: I recommend diabetes care be  2 weeks . 24 @ 1330  PCP Follow-Up: 24    Wilfrido Mckeon, PharmD    Continue all meds under the continuation of care with the referring provider and clinical pharmacy team.

## 2024-07-09 ENCOUNTER — APPOINTMENT (OUTPATIENT)
Dept: PHARMACY | Facility: HOSPITAL | Age: 64
End: 2024-07-09
Payer: COMMERCIAL

## 2024-07-09 DIAGNOSIS — E11.40 TYPE 2 DIABETES MELLITUS WITH DIABETIC NEUROPATHY, WITH LONG-TERM CURRENT USE OF INSULIN (MULTI): ICD-10-CM

## 2024-07-09 DIAGNOSIS — Z79.4 TYPE 2 DIABETES MELLITUS WITH DIABETIC NEUROPATHY, WITH LONG-TERM CURRENT USE OF INSULIN (MULTI): ICD-10-CM

## 2024-07-09 DIAGNOSIS — E11.65 TYPE 2 DIABETES MELLITUS WITH HYPERGLYCEMIA, WITH LONG-TERM CURRENT USE OF INSULIN (MULTI): ICD-10-CM

## 2024-07-09 DIAGNOSIS — Z79.4 TYPE 2 DIABETES MELLITUS WITH HYPERGLYCEMIA, WITH LONG-TERM CURRENT USE OF INSULIN (MULTI): ICD-10-CM

## 2024-07-09 RX ORDER — BLOOD SUGAR DIAGNOSTIC
1 STRIP MISCELLANEOUS 3 TIMES DAILY
Qty: 100 STRIP | Refills: 5 | Status: SHIPPED | OUTPATIENT
Start: 2024-07-09

## 2024-07-16 ENCOUNTER — PATIENT OUTREACH (OUTPATIENT)
Dept: PRIMARY CARE | Facility: CLINIC | Age: 64
End: 2024-07-16
Payer: COMMERCIAL

## 2024-07-16 DIAGNOSIS — E11.65 TYPE 2 DIABETES MELLITUS WITH HYPERGLYCEMIA, WITH LONG-TERM CURRENT USE OF INSULIN (MULTI): ICD-10-CM

## 2024-07-16 DIAGNOSIS — I10 ESSENTIAL (PRIMARY) HYPERTENSION: ICD-10-CM

## 2024-07-16 DIAGNOSIS — Z79.4 TYPE 2 DIABETES MELLITUS WITH HYPERGLYCEMIA, WITH LONG-TERM CURRENT USE OF INSULIN (MULTI): ICD-10-CM

## 2024-07-16 NOTE — PROGRESS NOTES
Chart review completed prior to CCM outreach. Call attempted to patient. No answer. Left message to call back. .     I explained that I am not going to make any more outreach attempts. If he would like to engage in Chronic Care Management, I left my phone number for him to call me.

## 2024-07-23 ENCOUNTER — APPOINTMENT (OUTPATIENT)
Dept: PHARMACY | Facility: HOSPITAL | Age: 64
End: 2024-07-23
Payer: COMMERCIAL

## 2024-08-01 ENCOUNTER — APPOINTMENT (OUTPATIENT)
Dept: PRIMARY CARE | Facility: CLINIC | Age: 64
End: 2024-08-01
Payer: COMMERCIAL

## 2024-08-01 ENCOUNTER — PATIENT OUTREACH (OUTPATIENT)
Dept: PRIMARY CARE | Facility: CLINIC | Age: 64
End: 2024-08-01

## 2024-08-01 VITALS
OXYGEN SATURATION: 91 % | WEIGHT: 315 LBS | RESPIRATION RATE: 20 BRPM | BODY MASS INDEX: 42.66 KG/M2 | SYSTOLIC BLOOD PRESSURE: 112 MMHG | HEART RATE: 89 BPM | DIASTOLIC BLOOD PRESSURE: 71 MMHG | HEIGHT: 72 IN

## 2024-08-01 DIAGNOSIS — E11.65 TYPE 2 DIABETES MELLITUS WITH HYPERGLYCEMIA, WITH LONG-TERM CURRENT USE OF INSULIN (MULTI): Primary | ICD-10-CM

## 2024-08-01 DIAGNOSIS — Z79.4 TYPE 2 DIABETES MELLITUS WITH HYPERGLYCEMIA, WITH LONG-TERM CURRENT USE OF INSULIN (MULTI): Primary | ICD-10-CM

## 2024-08-01 DIAGNOSIS — E78.5 HYPERLIPIDEMIA ASSOCIATED WITH TYPE 2 DIABETES MELLITUS (MULTI): ICD-10-CM

## 2024-08-01 DIAGNOSIS — M25.561 CHRONIC PAIN OF BOTH KNEES: ICD-10-CM

## 2024-08-01 DIAGNOSIS — Z79.4 TYPE 2 DIABETES MELLITUS WITH DIABETIC NEUROPATHY, WITH LONG-TERM CURRENT USE OF INSULIN (MULTI): ICD-10-CM

## 2024-08-01 DIAGNOSIS — M25.562 CHRONIC PAIN OF BOTH KNEES: ICD-10-CM

## 2024-08-01 DIAGNOSIS — E66.01 MORBID OBESITY WITH BODY MASS INDEX (BMI) OF 40.0 OR HIGHER (MULTI): ICD-10-CM

## 2024-08-01 DIAGNOSIS — M54.50 CHRONIC BILATERAL LOW BACK PAIN WITHOUT SCIATICA: ICD-10-CM

## 2024-08-01 DIAGNOSIS — I10 ESSENTIAL (PRIMARY) HYPERTENSION: ICD-10-CM

## 2024-08-01 DIAGNOSIS — E11.65 TYPE 2 DIABETES MELLITUS WITH HYPERGLYCEMIA, WITH LONG-TERM CURRENT USE OF INSULIN (MULTI): ICD-10-CM

## 2024-08-01 DIAGNOSIS — E11.69 HYPERLIPIDEMIA ASSOCIATED WITH TYPE 2 DIABETES MELLITUS (MULTI): ICD-10-CM

## 2024-08-01 DIAGNOSIS — G89.29 CHRONIC BILATERAL LOW BACK PAIN WITHOUT SCIATICA: ICD-10-CM

## 2024-08-01 DIAGNOSIS — Z79.4 TYPE 2 DIABETES MELLITUS WITH HYPERGLYCEMIA, WITH LONG-TERM CURRENT USE OF INSULIN (MULTI): ICD-10-CM

## 2024-08-01 DIAGNOSIS — G89.29 CHRONIC PAIN OF BOTH KNEES: ICD-10-CM

## 2024-08-01 DIAGNOSIS — E11.40 TYPE 2 DIABETES MELLITUS WITH DIABETIC NEUROPATHY, WITH LONG-TERM CURRENT USE OF INSULIN (MULTI): ICD-10-CM

## 2024-08-01 PROBLEM — E13.621 FOOT ULCER DUE TO SECONDARY DM (MULTI): Status: RESOLVED | Noted: 2024-06-03 | Resolved: 2024-08-01

## 2024-08-01 PROBLEM — L97.509 FOOT ULCER DUE TO SECONDARY DM (MULTI): Status: RESOLVED | Noted: 2024-06-03 | Resolved: 2024-08-01

## 2024-08-01 LAB
POC ALBUMIN /CREATININE RATIO MANUALLY ENTERED: ABNORMAL UG/MG CREAT
POC HEMOGLOBIN A1C: 9.7 % (ref 4.2–6.5)
POC URINE ALBUMIN: 10 MG/L
POC URINE CREATININE: 50 MG/DL

## 2024-08-01 PROCEDURE — 3074F SYST BP LT 130 MM HG: CPT | Performed by: FAMILY MEDICINE

## 2024-08-01 PROCEDURE — 1036F TOBACCO NON-USER: CPT | Performed by: FAMILY MEDICINE

## 2024-08-01 PROCEDURE — 4010F ACE/ARB THERAPY RXD/TAKEN: CPT | Performed by: FAMILY MEDICINE

## 2024-08-01 PROCEDURE — 3008F BODY MASS INDEX DOCD: CPT | Performed by: FAMILY MEDICINE

## 2024-08-01 PROCEDURE — 3078F DIAST BP <80 MM HG: CPT | Performed by: FAMILY MEDICINE

## 2024-08-01 PROCEDURE — 3050F LDL-C >= 130 MG/DL: CPT | Performed by: FAMILY MEDICINE

## 2024-08-01 PROCEDURE — 99215 OFFICE O/P EST HI 40 MIN: CPT | Performed by: FAMILY MEDICINE

## 2024-08-01 PROCEDURE — 82044 UR ALBUMIN SEMIQUANTITATIVE: CPT | Performed by: FAMILY MEDICINE

## 2024-08-01 PROCEDURE — 3046F HEMOGLOBIN A1C LEVEL >9.0%: CPT | Performed by: FAMILY MEDICINE

## 2024-08-01 PROCEDURE — 83036 HEMOGLOBIN GLYCOSYLATED A1C: CPT | Performed by: FAMILY MEDICINE

## 2024-08-01 RX ORDER — NABUMETONE 500 MG/1
500 TABLET, FILM COATED ORAL DAILY PRN
Qty: 90 TABLET | Refills: 0 | Status: SHIPPED | OUTPATIENT
Start: 2024-08-01 | End: 2024-08-01

## 2024-08-01 RX ORDER — NABUMETONE 500 MG/1
500 TABLET, FILM COATED ORAL DAILY PRN
Qty: 90 TABLET | Refills: 0 | Status: SHIPPED | OUTPATIENT
Start: 2024-08-01

## 2024-08-01 ASSESSMENT — ENCOUNTER SYMPTOMS
DIARRHEA: 0
NUMBNESS: 1
ACTIVITY CHANGE: 0
VOMITING: 0
POLYPHAGIA: 0
ABDOMINAL PAIN: 0
MYALGIAS: 0
BACK PAIN: 1
CONSTIPATION: 0
UNEXPECTED WEIGHT CHANGE: 0
NAUSEA: 0
APPETITE CHANGE: 0
JOINT SWELLING: 0
RHINORRHEA: 0
PALPITATIONS: 0
EYE ITCHING: 0
CHILLS: 0
FATIGUE: 0
SHORTNESS OF BREATH: 0
DYSURIA: 0
SINUS PAIN: 0
DIZZINESS: 0
NECK STIFFNESS: 0
FREQUENCY: 0
WHEEZING: 0
DIAPHORESIS: 0
BLOOD IN STOOL: 0
ADENOPATHY: 0
AGITATION: 0
BRUISES/BLEEDS EASILY: 0
FACIAL SWELLING: 0
CHEST TIGHTNESS: 0
NERVOUS/ANXIOUS: 0
EYE PAIN: 0
VOICE CHANGE: 0
ARTHRALGIAS: 0
EYE REDNESS: 0
EYE DISCHARGE: 0
SORE THROAT: 0
COUGH: 0
SLEEP DISTURBANCE: 0
FLANK PAIN: 0
POLYDIPSIA: 0
COLOR CHANGE: 0
TROUBLE SWALLOWING: 0
HEMATURIA: 0
WOUND: 0
SINUS PRESSURE: 0
FEVER: 0

## 2024-08-01 NOTE — ASSESSMENT & PLAN NOTE
Obesity with BMI and comorbidities as noted above.   1. Encourge proper diet (low fat, low sodium, high fiber) with patient.   2. Encourage need for regular exercise (3 times per week, 40 minutes per session) with patient.  Counseling time: counseling time 15 mins for above care coordination

## 2024-08-01 NOTE — PROGRESS NOTES
Subjective   Patient ID: Reza Red is a 63 y.o. male who presents for Follow-up.  Today he is accompanied by accompanied by spouse.     Diabetes Mellitus  Patient presents for follow up of diabetes. Current symptoms include: hyperglycemia, paresthesia of the feet, and visual disturbances. Symptoms have gradually worsened. Patient denies increased appetite, nausea, polydipsia, polyuria, visual disturbances, and vomiting. Evaluation to date has included: hemoglobin A1C and microalbuminuria.  Home sugars: BGs are running  consistent with Hgb A1C. Current treatment: per med list. Son is cooking for him and that is helping. Last dilated eye exam: did not go at this time.      Reza Red is a 63 y.o. male with hypertension. However, his blood pressure has been running low since he doubled his dose. He is also smoking weed, which has decreased his pain from a 4/10 to a 1/10. Would like a new referral to a knee specialist because his current podiatrist moved. His blood sugars have been fluctuating but improving, however he is a poor historian and did not bring in his numbers.     Patient keeps complaining about his left knee and right knee is worse.  Has had fluid taken off one of the knees in the past.  Does not remember who he saw many years ago.  Having trouble with movement and bearing weight.  Also neuropathy to get worse and increased swelling in both lower legs.  Refuses to go to physical therapy or aquatic therapy. Taking Tylenol.  Review of Systems   Constitutional:  Negative for activity change, appetite change, chills, diaphoresis, fatigue, fever and unexpected weight change.   HENT:  Negative for congestion, dental problem, drooling, ear discharge, ear pain, facial swelling, hearing loss, nosebleeds, postnasal drip, rhinorrhea, sinus pressure, sinus pain, sneezing, sore throat, tinnitus, trouble swallowing and voice change.    Eyes:  Negative for pain, discharge, redness, itching and visual disturbance.  "  Respiratory:  Negative for cough, chest tightness, shortness of breath and wheezing.    Cardiovascular:  Negative for chest pain, palpitations and leg swelling.   Gastrointestinal:  Negative for abdominal pain, blood in stool, constipation, diarrhea, nausea and vomiting.   Endocrine: Negative for cold intolerance, heat intolerance, polydipsia, polyphagia and polyuria.   Genitourinary:  Negative for decreased urine volume, dysuria, flank pain, frequency, hematuria and urgency.   Musculoskeletal:  Positive for back pain and gait problem. Negative for arthralgias, joint swelling, myalgias and neck stiffness.   Skin:  Negative for color change, pallor, rash and wound.   Neurological:  Positive for numbness. Negative for dizziness.   Hematological:  Negative for adenopathy. Does not bruise/bleed easily.   Psychiatric/Behavioral:  Negative for agitation, behavioral problems and sleep disturbance. The patient is not nervous/anxious.        Objective   /71 (BP Location: Right arm, Patient Position: Sitting, BP Cuff Size: Large adult)   Pulse 89   Resp 20   Ht 1.822 m (5' 11.75\")   Wt 146 kg (321 lb 9.6 oz)   SpO2 91%   BMI 43.92 kg/m²   BSA: 2.72 meters squared  Growth percentiles: Facility age limit for growth %chandrakant is 20 years. Facility age limit for growth %chandrakant is 20 years.     Physical Exam  Vitals and nursing note reviewed. Exam conducted with a chaperone present.   Constitutional:       General: He is not in acute distress.     Appearance: Normal appearance. He is obese. He is not ill-appearing.      Comments: Using walker   HENT:      Head: Normocephalic and atraumatic.      Right Ear: External ear normal.      Left Ear: External ear normal.      Nose: Nose normal. No rhinorrhea.      Mouth/Throat:      Mouth: Mucous membranes are moist.   Eyes:      Conjunctiva/sclera: Conjunctivae normal.   Neck:      Vascular: No carotid bruit.   Cardiovascular:      Rate and Rhythm: Normal rate and regular rhythm. "      Pulses: Normal pulses.      Heart sounds: Normal heart sounds. No murmur heard.     No friction rub. No gallop.   Pulmonary:      Effort: Pulmonary effort is normal. No respiratory distress.      Breath sounds: Normal breath sounds. No stridor. No wheezing, rhonchi or rales.   Chest:      Chest wall: No tenderness.   Musculoskeletal:      Cervical back: Normal range of motion and neck supple. No rigidity or tenderness.      Right knee: Swelling, effusion and crepitus present. No deformity, erythema, ecchymosis, lacerations or bony tenderness. Decreased range of motion. Tenderness present over the medial joint line and patellar tendon. No LCL laxity, MCL laxity, ACL laxity or PCL laxity. Normal alignment, normal meniscus and normal patellar mobility. Normal pulse.      Right lower leg: Swelling present. Edema present.      Left lower leg: Swelling present. Edema present.      Right ankle: Swelling and ecchymosis present.      Left ankle: Swelling and ecchymosis present.      Right foot: Swelling present.      Left foot: Swelling present.   Neurological:      General: No focal deficit present.      Mental Status: He is alert and oriented to person, place, and time.      Sensory: Sensory deficit present.      Coordination: Coordination normal.      Gait: Gait abnormal.   Psychiatric:         Mood and Affect: Mood normal.         Behavior: Behavior normal.         Thought Content: Thought content normal.         Judgment: Judgment normal.         Assessment/Plan   Problem List Items Addressed This Visit             ICD-10-CM    Chronic bilateral low back pain without sciatica M54.50, G89.29    Relevant Medications    nabumetone (Relafen) 500 mg tablet    Other Relevant Orders    Disability Placard    Essential (primary) hypertension I10     Stable on current medications  Continue meds and exercise.   Hypertension Plan  Continue current treatment regimen.  On listed meds for BP and doing well at this  "time.  Medication changes per orders.  Dietary sodium restriction.  Regular aerobic exercise.  Weight loss.  Reduce stress.  Patient Education: Reviewed risks of hypertension and principles of   treatment.             Relevant Orders    Disability Placard    Hyperlipidemia associated with type 2 diabetes mellitus (Multi) E11.69, E78.5     Hyperlipidemia Assessment  Dyslipidemia under poor control,    Target levels for LDL are: < 70 mg/dl (\"very high\" risk for CHD)    Explained to him the respective contributions of genetics, diet, and exercise to lipid levels and the use of medication in severe cases which do not respond to lifestyle alteration. His interest and motivation in making lifestyle changes seems fair.     Hyperlipidemia Plan  The following changes are planned for the next 1 months, at which time the patient will return for repeat fasting lipids:    1. Dietary changes:   Increase soluble fiber  Reduce saturated fat, \"trans\" monounsaturated fatty acids, and cholesterol  2. Exercise changes:   needs to increase   3. Other treatment   Treatment of diabetes (as listed)  Treatment of hypertension (as listed)  Weight reduction (continue)  4. Lipid-lowering medications:  continue meds  (Recommended by NCEP after 3-6 months of dietary therapy & lifestyle modification,   except if CHD is present or LDL well above 190.)  5. Screening for secondary causes of dyslipidemias:  All labs    Note: The majority of the visit was spent in counseling on the pathophysiology and treatment of dyslipidemias. The total face-to-face time was in excess of 45 minutes.           Relevant Orders    Disability Placard    Morbid obesity with body mass index (BMI) of 40.0 or higher (Multi) E66.01     Obesity with BMI and comorbidities as noted above.   1. Encourge proper diet (low fat, low sodium, high fiber) with patient.   2. Encourage need for regular exercise (3 times per week, 40 minutes per session) with patient.  Counseling time: " counseling time 15 mins for above care coordination                Relevant Orders    Disability Placard    Diabetes mellitus with hyperglycemia, with long-term current use of insulin (Multi) - Primary E11.65, Z79.4    Relevant Orders    POCT glycosylated hemoglobin (Hb A1C) manually resulted (Completed)    POCT Albumin random urine manually resulted (Completed)    Disability Placard    Type 2 diabetes mellitus with diabetic neuropathy, with long-term current use of insulin (Multi) E11.40, Z79.4     Diabetes mellitus Type II, under poor control.  Discussed general issues about diabetes pathophysiology and management.  Counseling at today's visit: discussed the need for weight loss, focused on the need for regular aerobic exercise, focused on the need to adhere to the prescribed ADA diet, discussed the advantages of a diet low in carbohydrates, and reminded to check sugars regularly and to bring readings in at the time of the next visit.  Educational material distributed.  Addressed ADA diet.  Suggested low cholesterol diet.  Encouraged aerobic exercise.           Relevant Orders    Disability Placard     Other Visit Diagnoses         Codes    Chronic pain of both knees     M25.561, M25.562, G89.29    Relevant Medications    nabumetone (Relafen) 500 mg tablet    Other Relevant Orders    Disability Placard          Lab Results   Component Value Date    HGBA1C 9.7 (A) 08/01/2024       Current Outpatient Medications   Medication Sig Dispense Refill    allopurinol (Zyloprim) 300 mg tablet TAKE ONE TABLET BY MOUTH ONE TIME DAILY 90 tablet 0    blood sugar diagnostic (Accu-Chek Dorie Plus test strp) strip 1 strip 3 times a day. 100 strip 5    blood-glucose meter misc Use to check glucose levels 3 times a day as directed 1 each 0    cholecalciferol (Vitamin D3) 50 mcg (2,000 unit) capsule Take 2 capsules (100 mcg) by mouth once daily.      dapaglifloz propaned-metformin (Xigduo XR) 5-1,000 mg Take 1 tablet by mouth 2 times  "a day before meals. 180 tablet 0    DULoxetine (Cymbalta) 60 mg DR capsule TAKE ONE CAPSULE BY MOUTH ONE TIME DAILY 90 capsule 0    flash glucose sensor kit (FreeStyle Scotty 2 Sensor) kit Inject 1 each under the skin every 14 (fourteen) days. Use to test 4 times daily as directed. 2 each 11    gabapentin (Neurontin) 600 mg tablet TAKE ONE TABLET BY MOUTH TWICE DAILY 180 tablet 0    insulin aspart (NovoLOG Flexpen U-100 Insulin) 100 unit/mL (3 mL) pen Inject 36 Units under the skin 3 times a day with meals. 105 mL 1    insulin degludec (Tresiba FlexTouch U-200) 200 unit/mL (3 mL) injection Inject 60 Units under the skin 2 times a day. 63 mL 1    lancets misc Use to check glucose levels 3 times a day as directed 100 each 0    medical cannabis Take 1 each by mouth.      metoprolol tartrate (Lopressor) 100 mg tablet Take 1 tablet (100 mg) by mouth once daily. 90 tablet 0    pantoprazole (ProtoNix) 40 mg EC tablet Take 1 tablet (40 mg) by mouth 2 times a day. 180 tablet 1    pen needle, diabetic (BD Ultra-Fine Micro Pen Needle) 32 gauge x 1/4\" needle USE 1 NEEDLE 5 TIMES A DAY WITH NOVOLOG AND TRESIBA 500 each 0    tirzepatide (Mounjaro) 15 mg/0.5 mL pen injector Inject 15 mg under the skin 1 (one) time per week. 6 mL 0    valsartan (Diovan) 320 mg tablet Take 1 tablet (320 mg) by mouth once daily. 90 tablet 0    atorvastatin (Lipitor) 40 mg tablet Take 1 tablet (40 mg) by mouth once daily. 90 tablet 1    nabumetone (Relafen) 500 mg tablet Take 1 tablet (500 mg) by mouth once daily as needed for mild pain (1 - 3) or moderate pain (4 - 6). 90 tablet 0     No current facility-administered medications for this visit.       F/U in 3 months for diabetes  Refer to ophthalmologist  Refer to knee specialist and needs to consider PT  Handicap placard  I personally spent over 50% of a total 45 minutes in counseling and discussion with the patient and coordination of care as described above.     "

## 2024-08-01 NOTE — PROGRESS NOTES
Patient known to me as he was previously enrolled in Chronic Care Management but had been dis-enrolled for not engaging. I saw him at his appt with PCP today. He understands that he needs to speak with me on a monthly basis and to please answer or return my calls. If he is not engaging then we will have to dis-enroll him from the program. He verbalizes understanding. Wife Laura was with him at the time and verbalizes understanding as well.     Patient's wife reports that he did see Cardiology although I do not see a consult letter on file, so I will try and call for that. Patient reports he did not change any of his medications.     He has not seen Opthalmology. I printed a copy of the email I sent her with the phone number on it so she can please contact them. It was [facility] that we referred him to.     He is supposed to make an appt with his Ortho person who he saw for his knee. He did not know who he saw. I called the [facility] and he sees Dr. Roth. But I also found out he is unable to make another appt until he reaches out to billing and settles some debt. I gave them the phone number which is [phone].     I also messaged Wilfrido with [facility] to let him know that patient is back and enrolled in Care Management.

## 2024-08-01 NOTE — PATIENT INSTRUCTIONS
"F/U in 3 months for diabetes  Refer to ophthalmologist  Refer to knee specialist and needs to consider PT    Current Outpatient Medications   Medication Sig Dispense Refill    allopurinol (Zyloprim) 300 mg tablet TAKE ONE TABLET BY MOUTH ONE TIME DAILY 90 tablet 0    blood sugar diagnostic (Accu-Chek Dorie Plus test strp) strip 1 strip 3 times a day. 100 strip 5    blood-glucose meter misc Use to check glucose levels 3 times a day as directed 1 each 0    cholecalciferol (Vitamin D3) 50 mcg (2,000 unit) capsule Take 2 capsules (100 mcg) by mouth once daily.      dapaglifloz propaned-metformin (Xigduo XR) 5-1,000 mg Take 1 tablet by mouth 2 times a day before meals. 180 tablet 0    DULoxetine (Cymbalta) 60 mg DR capsule TAKE ONE CAPSULE BY MOUTH ONE TIME DAILY 90 capsule 0    flash glucose sensor kit (FreeStyle Scotty 2 Sensor) kit Inject 1 each under the skin every 14 (fourteen) days. Use to test 4 times daily as directed. 2 each 11    gabapentin (Neurontin) 600 mg tablet TAKE ONE TABLET BY MOUTH TWICE DAILY 180 tablet 0    insulin aspart (NovoLOG Flexpen U-100 Insulin) 100 unit/mL (3 mL) pen Inject 36 Units under the skin 3 times a day with meals. 105 mL 1    insulin degludec (Tresiba FlexTouch U-200) 200 unit/mL (3 mL) injection Inject 60 Units under the skin 2 times a day. 63 mL 1    lancets misc Use to check glucose levels 3 times a day as directed 100 each 0    medical cannabis Take 1 each by mouth.      metoprolol tartrate (Lopressor) 100 mg tablet Take 1 tablet (100 mg) by mouth once daily. 90 tablet 0    pantoprazole (ProtoNix) 40 mg EC tablet Take 1 tablet (40 mg) by mouth 2 times a day. 180 tablet 1    pen needle, diabetic (BD Ultra-Fine Micro Pen Needle) 32 gauge x 1/4\" needle USE 1 NEEDLE 5 TIMES A DAY WITH NOVOLOG AND TRESIBA 500 each 0    tirzepatide (Mounjaro) 15 mg/0.5 mL pen injector Inject 15 mg under the skin 1 (one) time per week. 6 mL 0    valsartan (Diovan) 320 mg tablet Take 1 tablet (320 mg) by " mouth once daily. 90 tablet 0    atorvastatin (Lipitor) 40 mg tablet Take 1 tablet (40 mg) by mouth once daily. 90 tablet 1    nabumetone (Relafen) 500 mg tablet Take 1 tablet (500 mg) by mouth once daily as needed for mild pain (1 - 3) or moderate pain (4 - 6). 90 tablet 0     No current facility-administered medications for this visit.

## 2024-08-01 NOTE — ASSESSMENT & PLAN NOTE
Stable on current medications  Continue meds and exercise.   Hypertension Plan  Continue current treatment regimen.  On listed meds for BP and doing well at this time.  Medication changes per orders.  Dietary sodium restriction.  Regular aerobic exercise.  Weight loss.  Reduce stress.  Patient Education: Reviewed risks of hypertension and principles of   treatment.

## 2024-08-22 ENCOUNTER — PATIENT OUTREACH (OUTPATIENT)
Dept: PRIMARY CARE | Facility: CLINIC | Age: 64
End: 2024-08-22

## 2024-08-22 ENCOUNTER — PATIENT OUTREACH (OUTPATIENT)
Dept: PRIMARY CARE | Facility: CLINIC | Age: 64
End: 2024-08-22
Payer: COMMERCIAL

## 2024-08-22 DIAGNOSIS — E11.65 TYPE 2 DIABETES MELLITUS WITH HYPERGLYCEMIA, WITH LONG-TERM CURRENT USE OF INSULIN (MULTI): ICD-10-CM

## 2024-08-22 DIAGNOSIS — Z79.4 TYPE 2 DIABETES MELLITUS WITH HYPERGLYCEMIA, WITH LONG-TERM CURRENT USE OF INSULIN (MULTI): ICD-10-CM

## 2024-08-22 DIAGNOSIS — I10 ESSENTIAL (PRIMARY) HYPERTENSION: ICD-10-CM

## 2024-08-22 NOTE — PROGRESS NOTES
"Patient returns my call. He states he is putting a new sensor on and wants to calibrate it with a test strip but he has to get a test strip. Once he does this he will let me know what his sugar is. He doesn't think it will be too high since he \"hasn't eaten much today\". He states he had 2 small pieces of pizza and this morning he had the contents of a 'just crack an egg' fried with 2 eggs. He did ask if he could text or email me his numbers. I advised that I can't text but email would work. I sent him an email so he has mine. Turns out I was blocked by him and he doesn't know why. But that would explain all the emails he never responded to.     I did ask if he has made an appt with the eye doctor yet. He states no. His wife needs to do that since she is the . I encouraged him to please make this appt. I also confirmed he has a virtual with Wilfrido next week. I advised he keep that appt please.   "

## 2024-08-22 NOTE — PROGRESS NOTES
Chart review completed prior to CCM outreach. Call attempted to patient. No answer. Left message to call back.     I did call Dr. Madrigal's office for patient's last office note. They did not tell me when he was seen, but they are faxing this.

## 2024-08-26 ENCOUNTER — APPOINTMENT (OUTPATIENT)
Dept: PHARMACY | Facility: HOSPITAL | Age: 64
End: 2024-08-26
Payer: COMMERCIAL

## 2024-08-26 DIAGNOSIS — E11.65 TYPE 2 DIABETES MELLITUS WITH HYPERGLYCEMIA, WITH LONG-TERM CURRENT USE OF INSULIN (MULTI): ICD-10-CM

## 2024-08-26 DIAGNOSIS — Z79.4 TYPE 2 DIABETES MELLITUS WITH HYPERGLYCEMIA, WITH LONG-TERM CURRENT USE OF INSULIN (MULTI): ICD-10-CM

## 2024-08-26 NOTE — PROGRESS NOTES
"Patient is sent at the request of Alis Jones MD for my opinion regarding Type 2 diabetes.  My final recommendations will be communicated back to the requesting provider by way of shared medical record.    Subjective     HPI    Past Medical History:  He has a past medical history of Anemia, Foot ulcer due to secondary DM (Multi) (06/03/2024), Personal history of other endocrine, nutritional and metabolic disease (09/17/2021), Personal history of other infectious and parasitic diseases (11/12/2021), and Personal history of other specified conditions (01/18/2022).    Past Surgical History:  He has no past surgical history on file.    Social History:  He reports that he has never smoked. He has never been exposed to tobacco smoke. He has never used smokeless tobacco. He reports that he does not currently use alcohol. He reports that he does not use drugs.    Family History:  Family History   Problem Relation Name Age of Onset    Other (cardiac disorder) Father      Other (cerebrovascular accident) Father      Cancer Father      Thyroid disease Other         Allergies:  Patient has no known allergies.    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  He is filling his Scotty through [employer] and has not started with Dexcom. OK to continue.     Hypoglycemia frequency: Some symptoms of lows this week in the morning. Treats with half a glass of soda or chewable candies, then have protein and a slower absorbing carb.   Hypoglycemia awareness: Yes     Reports some cramping. Will focus on hydration.     Adverse Effects: none     Objective     Last Recorded Vitals:  BP Readings from Last 6 Encounters:   08/01/24 112/71   06/03/24 99/67   04/25/24 93/66   01/17/24 110/72   11/09/23 140/74 " "  08/10/23 126/86        Wt Readings from Last 6 Encounters:   08/01/24 146 kg (321 lb 9.6 oz)   06/03/24 147 kg (324 lb 8 oz)   04/25/24 147 kg (324 lb 4 oz)   01/17/24 148 kg (327 lb 2 oz)   11/09/23 (!) 152 kg (335 lb 6 oz)   08/10/23 (!) 157 kg (345 lb 12.8 oz)     Diabetes Pharmacotherapy:  Xigduo 5-1000 BID  Tresiba U-200 60 units BID  Novolog U-100 36 units TID WM  Mounjaro 15 mg (newly increased) weekly    Primary/Secondary Prevention   - Statin? Yes  - ACE-I/ARB? Yes  - Aspirin? No    Pertinent PMH Review:  - PMH of Pancreatitis: No  - PMH of Retinopathy: No  - PMH of Urinary Tract Infections: No  - PMH of MTC: No    Lab Review  Lab Results   Component Value Date    BILITOT 0.7 04/25/2024    CALCIUM 11.6 (H) 04/25/2024    CO2 18 (L) 04/25/2024    CL 98 04/25/2024    CREATININE 1.26 04/25/2024    GLUCOSE 181 (H) 04/25/2024    ALKPHOS 71 04/25/2024    K 4.7 04/25/2024    PROT 8.0 04/25/2024     04/25/2024    AST 22 04/25/2024    ALT 23 04/25/2024    BUN 18 04/25/2024    ANIONGAP 27 (H) 04/25/2024    MG 2.54 (H) 01/17/2024    ALBUMIN 4.5 04/25/2024    GFRMALE 69 11/11/2022     Lab Results   Component Value Date    TRIG 287 (H) 01/17/2024    CHOL 244 (H) 01/17/2024    LDLCALC 138 (H) 01/17/2024    HDL 48.2 01/17/2024     Lab Results   Component Value Date    HGBA1C 9.7 (A) 08/01/2024    HGBA1C 10.1 (H) 04/25/2024    HGBA1C 10.6 (A) 04/25/2024     No components found for: \"UACR\"  The 10-year ASCVD risk score (Sury DK, et al., 2019) is: 19.3%    Values used to calculate the score:      Age: 63 years      Sex: Male      Is Non- : No      Diabetic: Yes      Tobacco smoker: No      Systolic Blood Pressure: 112 mmHg      Is BP treated: Yes      HDL Cholesterol: 48.2 mg/dL      Total Cholesterol: 244 mg/dL    Health Maintenance:   Foot Exam: performs regularly, due at next PCP appt, has lost a toenail recently, some neuropathy  Eye Exam: 4 months ago  Lipid Panel: updated  Influenza " Immunization: updated  Pneumonia Immunization: Done, due at age 67    Drug Interactions:  None to consider at this time    Assessment/Plan   Problem List Items Addressed This Visit       Diabetes mellitus with hyperglycemia, with long-term current use of insulin (Multi)       Patients diabetes is improved, however with most recent A1c of 10% (Goal < 7%).   Continue: BG monitoring to plan to adjust insulin. Check AM, and all postprandial results for 2 weeks.   Reminded to adhere to mealtime insulin- taking mealtime insulin at most 15 minutes before meals instead of after to allow the body to more effectively process BG.   Goal to increase time in range to > 80 with no readings above 240  Atorvastatin was started > 3 months ago with elevated LDL in January. Goal LDL < 100. Recheck LDL with next labs. Increase to 80 mg atorvastatin if elevated.   Compliance at present is estimated to be good. Efforts to improve compliance will be directed at regular blood sugar monitorin times daily.   Education Provided to Patient: Continue BG monitoring, consider exercise  Follow-up: I recommend diabetes care be  2 weeks . 9/10/24 @ 1630  PCP Follow-Up:24    Wilfrido Mckeon, PharmD    Continue all meds under the continuation of care with the referring provider and clinical pharmacy team.

## 2024-09-10 ENCOUNTER — APPOINTMENT (OUTPATIENT)
Dept: PHARMACY | Facility: HOSPITAL | Age: 64
End: 2024-09-10
Payer: COMMERCIAL

## 2024-09-25 ENCOUNTER — PATIENT OUTREACH (OUTPATIENT)
Dept: PRIMARY CARE | Facility: CLINIC | Age: 64
End: 2024-09-25
Payer: COMMERCIAL

## 2024-09-25 DIAGNOSIS — Z79.4 TYPE 2 DIABETES MELLITUS WITH HYPERGLYCEMIA, WITH LONG-TERM CURRENT USE OF INSULIN: ICD-10-CM

## 2024-09-25 DIAGNOSIS — E11.65 TYPE 2 DIABETES MELLITUS WITH HYPERGLYCEMIA, WITH LONG-TERM CURRENT USE OF INSULIN: ICD-10-CM

## 2024-09-27 ENCOUNTER — PATIENT OUTREACH (OUTPATIENT)
Dept: PRIMARY CARE | Facility: CLINIC | Age: 64
End: 2024-09-27
Payer: COMMERCIAL

## 2024-09-27 DIAGNOSIS — Z79.4 TYPE 2 DIABETES MELLITUS WITH HYPERGLYCEMIA, WITH LONG-TERM CURRENT USE OF INSULIN: ICD-10-CM

## 2024-09-27 DIAGNOSIS — E11.65 TYPE 2 DIABETES MELLITUS WITH HYPERGLYCEMIA, WITH LONG-TERM CURRENT USE OF INSULIN: ICD-10-CM

## 2024-09-27 DIAGNOSIS — Z79.4 TYPE 2 DIABETES MELLITUS WITH DIABETIC NEUROPATHY, WITH LONG-TERM CURRENT USE OF INSULIN: ICD-10-CM

## 2024-09-27 DIAGNOSIS — I10 ESSENTIAL (PRIMARY) HYPERTENSION: ICD-10-CM

## 2024-09-27 DIAGNOSIS — E11.40 TYPE 2 DIABETES MELLITUS WITH DIABETIC NEUROPATHY, WITH LONG-TERM CURRENT USE OF INSULIN: ICD-10-CM

## 2024-09-27 NOTE — PROGRESS NOTES
Patient returned my call from the other day. He states that he did something to his back and he is really hurting. He has ran out of his gabapentin and needs a refill. I do see an RX pended from the pharmacy to Dr. Jones. But patient is out of this now. I explained that she is not in the office but she may log in to complete tasks. If not, I could try asking the other physician who is in the office today, but there is currently only one.     Patient states he wore his glucose sensor and all last week was in the green. When I asked what levels green represent, he states <180. He does not have a sensor on now. He plans to put it on today. He states he is just going to keep doing the same diet he did last week.     He also admits that he forgot to take BP meds but got back to taking them regularly and feels better. Encouraged him to remain compliant with medications.     He has not made an appt with the Ophthalmologist yet. He states his wife is supposed to be taking some time off in November. He states they reduced her hours to 3 days a week and she is using PTO so she will be off for a month and still get paid. I advised he call and make that appt sooner rather than later so they have openings in November. Advised he not wait until the last minute and then be surprised when he can't get in the next day.     Patient also mentions that the pharmacist Wilfrido did not call him for his appt. I explained that the documentation I have states he did call on 9/10 and left a voicemail message at [phone]. Patient states his phone must not be working right or something, because he swears he never got a call.

## 2024-09-30 RX ORDER — GABAPENTIN 600 MG/1
600 TABLET ORAL 2 TIMES DAILY
Qty: 180 TABLET | Refills: 0 | Status: SHIPPED | OUTPATIENT
Start: 2024-09-30

## 2024-10-17 DIAGNOSIS — Z79.4 TYPE 2 DIABETES MELLITUS WITH DIABETIC NEUROPATHY, WITH LONG-TERM CURRENT USE OF INSULIN: ICD-10-CM

## 2024-10-17 DIAGNOSIS — E11.65 TYPE 2 DIABETES MELLITUS WITH HYPERGLYCEMIA, WITH LONG-TERM CURRENT USE OF INSULIN: ICD-10-CM

## 2024-10-17 DIAGNOSIS — E11.40 TYPE 2 DIABETES MELLITUS WITH DIABETIC NEUROPATHY, WITH LONG-TERM CURRENT USE OF INSULIN: ICD-10-CM

## 2024-10-17 DIAGNOSIS — Z79.4 TYPE 2 DIABETES MELLITUS WITH HYPERGLYCEMIA, WITH LONG-TERM CURRENT USE OF INSULIN: ICD-10-CM

## 2024-10-18 RX ORDER — TIRZEPATIDE 15 MG/.5ML
INJECTION, SOLUTION SUBCUTANEOUS
Qty: 6 ML | Refills: 0 | Status: SHIPPED | OUTPATIENT
Start: 2024-10-18

## 2024-10-23 ENCOUNTER — PATIENT OUTREACH (OUTPATIENT)
Dept: PRIMARY CARE | Facility: CLINIC | Age: 64
End: 2024-10-23
Payer: COMMERCIAL

## 2024-10-23 DIAGNOSIS — Z79.4 TYPE 2 DIABETES MELLITUS WITH HYPERGLYCEMIA, WITH LONG-TERM CURRENT USE OF INSULIN: ICD-10-CM

## 2024-10-23 DIAGNOSIS — I10 ESSENTIAL (PRIMARY) HYPERTENSION: ICD-10-CM

## 2024-10-23 DIAGNOSIS — E11.65 TYPE 2 DIABETES MELLITUS WITH HYPERGLYCEMIA, WITH LONG-TERM CURRENT USE OF INSULIN: ICD-10-CM

## 2024-10-29 ENCOUNTER — PATIENT OUTREACH (OUTPATIENT)
Dept: PRIMARY CARE | Facility: CLINIC | Age: 64
End: 2024-10-29
Payer: COMMERCIAL

## 2024-10-29 DIAGNOSIS — I10 ESSENTIAL (PRIMARY) HYPERTENSION: ICD-10-CM

## 2024-10-29 DIAGNOSIS — Z79.4 TYPE 2 DIABETES MELLITUS WITH HYPERGLYCEMIA, WITH LONG-TERM CURRENT USE OF INSULIN: ICD-10-CM

## 2024-10-29 DIAGNOSIS — E11.65 TYPE 2 DIABETES MELLITUS WITH HYPERGLYCEMIA, WITH LONG-TERM CURRENT USE OF INSULIN: ICD-10-CM

## 2024-11-04 ENCOUNTER — LAB (OUTPATIENT)
Dept: LAB | Facility: LAB | Age: 64
End: 2024-11-04
Payer: COMMERCIAL

## 2024-11-04 DIAGNOSIS — Z79.4 TYPE 2 DIABETES MELLITUS WITH HYPERGLYCEMIA, WITH LONG-TERM CURRENT USE OF INSULIN: ICD-10-CM

## 2024-11-04 DIAGNOSIS — Z00.00 ENCOUNTER FOR ANNUAL GENERAL MEDICAL EXAMINATION WITHOUT ABNORMAL FINDINGS IN ADULT: ICD-10-CM

## 2024-11-04 DIAGNOSIS — E11.65 TYPE 2 DIABETES MELLITUS WITH HYPERGLYCEMIA, WITH LONG-TERM CURRENT USE OF INSULIN: ICD-10-CM

## 2024-11-04 DIAGNOSIS — E83.52 HYPERCALCEMIA: ICD-10-CM

## 2024-11-04 DIAGNOSIS — M1A.20X0 CHRONIC GOUT DUE TO DRUG WITHOUT TOPHUS, UNSPECIFIED SITE: ICD-10-CM

## 2024-11-04 DIAGNOSIS — E78.2 MIXED HYPERLIPIDEMIA: ICD-10-CM

## 2024-11-04 LAB
BASOPHILS # BLD AUTO: 0.12 X10*3/UL (ref 0–0.1)
BASOPHILS NFR BLD AUTO: 1.1 %
EOSINOPHIL # BLD AUTO: 0.39 X10*3/UL (ref 0–0.7)
EOSINOPHIL NFR BLD AUTO: 3.4 %
ERYTHROCYTE [DISTWIDTH] IN BLOOD BY AUTOMATED COUNT: 13.3 % (ref 11.5–14.5)
HCT VFR BLD AUTO: 44.5 % (ref 41–52)
HGB BLD-MCNC: 15.2 G/DL (ref 13.5–17.5)
IMM GRANULOCYTES # BLD AUTO: 0.04 X10*3/UL (ref 0–0.7)
IMM GRANULOCYTES NFR BLD AUTO: 0.4 % (ref 0–0.9)
LYMPHOCYTES # BLD AUTO: 3.44 X10*3/UL (ref 1.2–4.8)
LYMPHOCYTES NFR BLD AUTO: 30.1 %
MCH RBC QN AUTO: 30.4 PG (ref 26–34)
MCHC RBC AUTO-ENTMCNC: 34.2 G/DL (ref 32–36)
MCV RBC AUTO: 89 FL (ref 80–100)
MONOCYTES # BLD AUTO: 0.61 X10*3/UL (ref 0.1–1)
MONOCYTES NFR BLD AUTO: 5.3 %
NEUTROPHILS # BLD AUTO: 6.82 X10*3/UL (ref 1.2–7.7)
NEUTROPHILS NFR BLD AUTO: 59.7 %
NRBC BLD-RTO: 0 /100 WBCS (ref 0–0)
PLATELET # BLD AUTO: 305 X10*3/UL (ref 150–450)
RBC # BLD AUTO: 5 X10*6/UL (ref 4.5–5.9)
WBC # BLD AUTO: 11.4 X10*3/UL (ref 4.4–11.3)

## 2024-11-04 PROCEDURE — 83970 ASSAY OF PARATHORMONE: CPT

## 2024-11-04 PROCEDURE — 84550 ASSAY OF BLOOD/URIC ACID: CPT

## 2024-11-04 PROCEDURE — 85025 COMPLETE CBC W/AUTO DIFF WBC: CPT

## 2024-11-04 PROCEDURE — 80061 LIPID PANEL: CPT

## 2024-11-04 PROCEDURE — 80048 BASIC METABOLIC PNL TOTAL CA: CPT

## 2024-11-04 PROCEDURE — 81001 URINALYSIS AUTO W/SCOPE: CPT

## 2024-11-04 PROCEDURE — 36415 COLL VENOUS BLD VENIPUNCTURE: CPT

## 2024-11-05 DIAGNOSIS — R73.9 ELEVATED BLOOD SUGAR: ICD-10-CM

## 2024-11-05 LAB
ANION GAP SERPL CALC-SCNC: 16 MMOL/L (ref 10–20)
APPEARANCE UR: ABNORMAL
BILIRUB UR STRIP.AUTO-MCNC: NEGATIVE MG/DL
BUN SERPL-MCNC: 12 MG/DL (ref 6–23)
CALCIUM SERPL-MCNC: 9.3 MG/DL (ref 8.6–10.6)
CHLORIDE SERPL-SCNC: 102 MMOL/L (ref 98–107)
CHOLEST SERPL-MCNC: 206 MG/DL (ref 0–199)
CHOLESTEROL/HDL RATIO: 4.7
CO2 SERPL-SCNC: 23 MMOL/L (ref 21–32)
COLOR UR: YELLOW
CREAT SERPL-MCNC: 0.83 MG/DL (ref 0.5–1.3)
EGFRCR SERPLBLD CKD-EPI 2021: >90 ML/MIN/1.73M*2
GLUCOSE SERPL-MCNC: 236 MG/DL (ref 74–99)
GLUCOSE UR STRIP.AUTO-MCNC: NEGATIVE MG/DL
HDLC SERPL-MCNC: 43.5 MG/DL
HOLD SPECIMEN: NORMAL
KETONES UR STRIP.AUTO-MCNC: NEGATIVE MG/DL
LDLC SERPL CALC-MCNC: 123 MG/DL
LEUKOCYTE ESTERASE UR QL STRIP.AUTO: NEGATIVE
NITRITE UR QL STRIP.AUTO: NEGATIVE
NON HDL CHOLESTEROL: 163 MG/DL (ref 0–149)
PH UR STRIP.AUTO: 5.5 [PH]
POTASSIUM SERPL-SCNC: 4.1 MMOL/L (ref 3.5–5.3)
PROT UR STRIP.AUTO-MCNC: ABNORMAL MG/DL
PTH-INTACT SERPL-MCNC: 62.8 PG/ML (ref 18.5–88)
RBC # UR STRIP.AUTO: NEGATIVE /UL
RBC #/AREA URNS AUTO: NORMAL /HPF
SODIUM SERPL-SCNC: 137 MMOL/L (ref 136–145)
SP GR UR STRIP.AUTO: 1.02
TRIGL SERPL-MCNC: 199 MG/DL (ref 0–149)
URATE SERPL-MCNC: 6.2 MG/DL (ref 4–7.5)
UROBILINOGEN UR STRIP.AUTO-MCNC: 0.2 MG/DL
VLDL: 40 MG/DL (ref 0–40)
WBC #/AREA URNS AUTO: NORMAL /HPF

## 2024-11-11 ENCOUNTER — APPOINTMENT (OUTPATIENT)
Dept: PRIMARY CARE | Facility: CLINIC | Age: 64
End: 2024-11-11
Payer: COMMERCIAL

## 2024-11-11 VITALS
HEART RATE: 108 BPM | BODY MASS INDEX: 44.1 KG/M2 | DIASTOLIC BLOOD PRESSURE: 75 MMHG | WEIGHT: 315 LBS | OXYGEN SATURATION: 95 % | HEIGHT: 71 IN | TEMPERATURE: 98.1 F | SYSTOLIC BLOOD PRESSURE: 111 MMHG

## 2024-11-11 DIAGNOSIS — I10 ESSENTIAL (PRIMARY) HYPERTENSION: ICD-10-CM

## 2024-11-11 DIAGNOSIS — Z79.4 TYPE 2 DIABETES MELLITUS WITH DIABETIC NEUROPATHY, WITH LONG-TERM CURRENT USE OF INSULIN: ICD-10-CM

## 2024-11-11 DIAGNOSIS — I47.10 SUPRAVENTRICULAR TACHYCARDIA (CMS-HCC): ICD-10-CM

## 2024-11-11 DIAGNOSIS — E11.69 HYPERLIPIDEMIA ASSOCIATED WITH TYPE 2 DIABETES MELLITUS (MULTI): ICD-10-CM

## 2024-11-11 DIAGNOSIS — E78.5 HYPERLIPIDEMIA ASSOCIATED WITH TYPE 2 DIABETES MELLITUS (MULTI): ICD-10-CM

## 2024-11-11 DIAGNOSIS — E11.40 TYPE 2 DIABETES MELLITUS WITH DIABETIC NEUROPATHY, WITH LONG-TERM CURRENT USE OF INSULIN: ICD-10-CM

## 2024-11-11 DIAGNOSIS — E66.01 MORBID OBESITY WITH BODY MASS INDEX (BMI) OF 40.0 OR HIGHER (MULTI): ICD-10-CM

## 2024-11-11 DIAGNOSIS — Z79.4 TYPE 2 DIABETES MELLITUS WITH HYPERGLYCEMIA, WITH LONG-TERM CURRENT USE OF INSULIN: Primary | ICD-10-CM

## 2024-11-11 DIAGNOSIS — Z23 NEED FOR VACCINATION: ICD-10-CM

## 2024-11-11 DIAGNOSIS — E11.65 TYPE 2 DIABETES MELLITUS WITH HYPERGLYCEMIA, WITH LONG-TERM CURRENT USE OF INSULIN: Primary | ICD-10-CM

## 2024-11-11 DIAGNOSIS — I73.9 PVD (PERIPHERAL VASCULAR DISEASE) WITH CLAUDICATION (CMS-HCC): ICD-10-CM

## 2024-11-11 DIAGNOSIS — G89.29 CHRONIC BILATERAL LOW BACK PAIN WITHOUT SCIATICA: ICD-10-CM

## 2024-11-11 DIAGNOSIS — M54.50 CHRONIC BILATERAL LOW BACK PAIN WITHOUT SCIATICA: ICD-10-CM

## 2024-11-11 LAB — POC HEMOGLOBIN A1C: 8.5 % (ref 4.2–6.5)

## 2024-11-11 PROCEDURE — 90656 IIV3 VACC NO PRSV 0.5 ML IM: CPT | Performed by: FAMILY MEDICINE

## 2024-11-11 PROCEDURE — 3046F HEMOGLOBIN A1C LEVEL >9.0%: CPT | Performed by: FAMILY MEDICINE

## 2024-11-11 PROCEDURE — 83036 HEMOGLOBIN GLYCOSYLATED A1C: CPT | Performed by: FAMILY MEDICINE

## 2024-11-11 PROCEDURE — 3074F SYST BP LT 130 MM HG: CPT | Performed by: FAMILY MEDICINE

## 2024-11-11 PROCEDURE — 90471 IMMUNIZATION ADMIN: CPT | Performed by: FAMILY MEDICINE

## 2024-11-11 PROCEDURE — 3078F DIAST BP <80 MM HG: CPT | Performed by: FAMILY MEDICINE

## 2024-11-11 PROCEDURE — 3049F LDL-C 100-129 MG/DL: CPT | Performed by: FAMILY MEDICINE

## 2024-11-11 PROCEDURE — 99214 OFFICE O/P EST MOD 30 MIN: CPT | Performed by: FAMILY MEDICINE

## 2024-11-11 PROCEDURE — 4010F ACE/ARB THERAPY RXD/TAKEN: CPT | Performed by: FAMILY MEDICINE

## 2024-11-11 PROCEDURE — 3008F BODY MASS INDEX DOCD: CPT | Performed by: FAMILY MEDICINE

## 2024-11-11 RX ORDER — ATORVASTATIN CALCIUM 40 MG/1
40 TABLET, FILM COATED ORAL DAILY
Qty: 90 TABLET | Refills: 1 | Status: SHIPPED | OUTPATIENT
Start: 2024-11-11 | End: 2025-05-10

## 2024-11-11 RX ORDER — GABAPENTIN 600 MG/1
600 TABLET ORAL 2 TIMES DAILY
Qty: 180 TABLET | Refills: 0 | Status: SHIPPED | OUTPATIENT
Start: 2024-11-11

## 2024-11-11 ASSESSMENT — PATIENT HEALTH QUESTIONNAIRE - PHQ9
2. FEELING DOWN, DEPRESSED OR HOPELESS: NOT AT ALL
10. IF YOU CHECKED OFF ANY PROBLEMS, HOW DIFFICULT HAVE THESE PROBLEMS MADE IT FOR YOU TO DO YOUR WORK, TAKE CARE OF THINGS AT HOME, OR GET ALONG WITH OTHER PEOPLE: NOT DIFFICULT AT ALL
1. LITTLE INTEREST OR PLEASURE IN DOING THINGS: NOT AT ALL
SUM OF ALL RESPONSES TO PHQ9 QUESTIONS 1 AND 2: 0

## 2024-11-11 ASSESSMENT — ENCOUNTER SYMPTOMS
LOSS OF SENSATION IN FEET: 0
OCCASIONAL FEELINGS OF UNSTEADINESS: 0
DEPRESSION: 0

## 2024-11-11 NOTE — ASSESSMENT & PLAN NOTE
Diabetes mellitus Type II, under poor but better control.  Discussed general issues about diabetes pathophysiology and management.  Counseling at today's visit: discussed the need for weight loss, focused on the need for regular aerobic exercise, focused on the need to adhere to the prescribed ADA diet, discussed the advantages of a diet low in carbohydrates, and reminded to check sugars regularly and to bring readings in at the time of the next visit.  Educational material distributed.  Addressed ADA diet.  Suggested low cholesterol diet.  Encouraged aerobic exercise.

## 2024-11-11 NOTE — PATIENT INSTRUCTIONS
"F/U in 3 months for HTN and Diabetes  Continue current medications  Call if any new concerns  Please get RSV vaccine at the pharmacy    Current Outpatient Medications   Medication Sig Dispense Refill    blood sugar diagnostic (Accu-Chek Dorie Plus test strp) strip 1 strip 3 times a day. 100 strip 5    blood-glucose meter misc Use to check glucose levels 3 times a day as directed 1 each 0    cholecalciferol (Vitamin D3) 50 mcg (2,000 unit) capsule Take 2 capsules (100 mcg) by mouth once daily.      dapaglifloz propaned-metformin (Xigduo XR) 5-1,000 mg Take 1 tablet by mouth 2 times a day before meals. 180 tablet 0    DULoxetine (Cymbalta) 60 mg DR capsule TAKE ONE CAPSULE BY MOUTH ONE TIME DAILY 90 capsule 0    flash glucose sensor kit (FreeStyle Scotty 2 Sensor) kit Inject 1 each under the skin every 14 (fourteen) days. Use to test 4 times daily as directed. 2 each 11    insulin aspart (NovoLOG Flexpen U-100 Insulin) 100 unit/mL (3 mL) pen Inject 36 Units under the skin 3 times a day with meals. 105 mL 1    insulin degludec (Tresiba FlexTouch U-200) 200 unit/mL (3 mL) injection Inject 60 Units under the skin 2 times a day. 63 mL 1    lancets misc Use to check glucose levels 3 times a day as directed 100 each 0    medical cannabis Take 1 each by mouth.      metoprolol tartrate (Lopressor) 100 mg tablet Take 1 tablet (100 mg) by mouth once daily. 90 tablet 0    Mounjaro 15 mg/0.5 mL pen injector IINJECT 15MG UNDER THE SKIN ONCE A WEEK 6 mL 0    nabumetone (Relafen) 500 mg tablet Take 1 tablet (500 mg) by mouth once daily as needed for mild pain (1 - 3) or moderate pain (4 - 6). 90 tablet 0    pantoprazole (ProtoNix) 40 mg EC tablet Take 1 tablet (40 mg) by mouth 2 times a day. 180 tablet 1    pen needle, diabetic (BD Ultra-Fine Micro Pen Needle) 32 gauge x 1/4\" needle USE 1 NEEDLE 5 TIMES A DAY WITH NOVOLOG AND TRESIBA 500 each 0    valsartan (Diovan) 320 mg tablet Take 1 tablet (320 mg) by mouth once daily. 90 tablet " 0    atorvastatin (Lipitor) 40 mg tablet Take 1 tablet (40 mg) by mouth once daily. 90 tablet 1    gabapentin (Neurontin) 600 mg tablet Take 1 tablet (600 mg) by mouth 2 times a day. 180 tablet 0     No current facility-administered medications for this visit.     F/U for nurse visit on Monday for BP check

## 2024-11-11 NOTE — PROGRESS NOTES
"Subjective   Patient ID: Reza Red is a 64 y.o. male who presents for Follow-up (Patient is here for a 3 month follow up).  Today he is accompanied by accompanied by spouse.     HPI  Subjective:   Reza Red is a 64 y.o. male with hypertension.  Current Outpatient Medications   Medication Sig Dispense Refill    blood sugar diagnostic (Accu-Chek Dorie Plus test strp) strip 1 strip 3 times a day. 100 strip 5    blood-glucose meter misc Use to check glucose levels 3 times a day as directed 1 each 0    cholecalciferol (Vitamin D3) 50 mcg (2,000 unit) capsule Take 2 capsules (100 mcg) by mouth once daily.      dapaglifloz propaned-metformin (Xigduo XR) 5-1,000 mg Take 1 tablet by mouth 2 times a day before meals. 180 tablet 0    DULoxetine (Cymbalta) 60 mg DR capsule TAKE ONE CAPSULE BY MOUTH ONE TIME DAILY 90 capsule 0    flash glucose sensor kit (FreeStyle Scotty 2 Sensor) kit Inject 1 each under the skin every 14 (fourteen) days. Use to test 4 times daily as directed. 2 each 11    insulin aspart (NovoLOG Flexpen U-100 Insulin) 100 unit/mL (3 mL) pen Inject 36 Units under the skin 3 times a day with meals. 105 mL 1    insulin degludec (Tresiba FlexTouch U-200) 200 unit/mL (3 mL) injection Inject 60 Units under the skin 2 times a day. 63 mL 1    lancets misc Use to check glucose levels 3 times a day as directed 100 each 0    medical cannabis Take 1 each by mouth.      metoprolol tartrate (Lopressor) 100 mg tablet Take 1 tablet (100 mg) by mouth once daily. 90 tablet 0    Mounjaro 15 mg/0.5 mL pen injector IINJECT 15MG UNDER THE SKIN ONCE A WEEK 6 mL 0    nabumetone (Relafen) 500 mg tablet Take 1 tablet (500 mg) by mouth once daily as needed for mild pain (1 - 3) or moderate pain (4 - 6). 90 tablet 0    pantoprazole (ProtoNix) 40 mg EC tablet Take 1 tablet (40 mg) by mouth 2 times a day. 180 tablet 1    pen needle, diabetic (BD Ultra-Fine Micro Pen Needle) 32 gauge x 1/4\" needle USE 1 NEEDLE 5 TIMES A DAY WITH NOVOLOG " "AND TRESIBA 500 each 0    valsartan (Diovan) 320 mg tablet Take 1 tablet (320 mg) by mouth once daily. 90 tablet 0    atorvastatin (Lipitor) 40 mg tablet Take 1 tablet (40 mg) by mouth once daily. 90 tablet 1    gabapentin (Neurontin) 600 mg tablet Take 1 tablet (600 mg) by mouth 2 times a day. 180 tablet 0     No current facility-administered medications for this visit.      Hypertension ROS: taking medications as instructed, no medication side effects noted, no TIA's, no chest pain on exertion, no dyspnea on exertion, no swelling of ankles, no orthostatic dizziness or lightheadedness, no orthopnea or paroxysmal nocturnal dyspnea, no palpitations, and no intermittent claudication symptoms.   New concerns: Recently stopped all his medications about 2 weeks ago.  States he he lost his pillbox.  And was not able to find it.  Did not tell anybody about this.  Has not been taking his insulin either due to unable to reach it.  Continues to not eat very well or exercise.  Has back pain that has gotten worse.  Most likely due to not having his medications.  Has been more irritable and edgy because he has not been taking Cymbalta.  Blood pressure is low even though he is not on valsartan or metoprolol.  Has only been taking Xigduo twice a day.    Objective:   /75   Pulse 108   Temp 36.7 °C (98.1 °F) (Oral)   Ht 1.803 m (5' 11\")   Wt 147 kg (323 lb 12.8 oz)   SpO2 95%   BMI 45.16 kg/m²      Review of Systems   Constitutional:  Positive for appetite change and diaphoresis. Negative for activity change, chills, fatigue, fever and unexpected weight change.   HENT:  Negative for congestion, dental problem, drooling, ear discharge, ear pain, facial swelling, hearing loss, nosebleeds, postnasal drip, rhinorrhea, sinus pressure, sinus pain, sneezing, sore throat, tinnitus, trouble swallowing and voice change.    Eyes:  Negative for pain, discharge, redness, itching and visual disturbance.   Respiratory:  Negative for " "cough, chest tightness, shortness of breath and wheezing.    Cardiovascular:  Negative for chest pain, palpitations and leg swelling.   Gastrointestinal:  Negative for abdominal pain, blood in stool, constipation, diarrhea, nausea and vomiting.   Endocrine: Negative for cold intolerance, heat intolerance, polydipsia, polyphagia and polyuria.   Genitourinary:  Negative for decreased urine volume, dysuria, flank pain, frequency, hematuria and urgency.   Musculoskeletal:  Positive for arthralgias, back pain, gait problem, myalgias and neck pain. Negative for joint swelling and neck stiffness.   Skin:  Negative for color change, pallor, rash and wound.   Neurological:  Positive for headaches. Negative for dizziness.   Hematological:  Negative for adenopathy. Does not bruise/bleed easily.   Psychiatric/Behavioral:  Positive for agitation and sleep disturbance. Negative for behavioral problems. The patient is nervous/anxious.        Objective   /75   Pulse 108   Temp 36.7 °C (98.1 °F) (Oral)   Ht 1.803 m (5' 11\")   Wt 147 kg (323 lb 12.8 oz)   SpO2 95%   BMI 45.16 kg/m²   BSA: 2.71 meters squared  Growth percentiles: Facility age limit for growth %chandrakant is 20 years. Facility age limit for growth %chandrakant is 20 years.     Physical Exam  Vitals and nursing note reviewed. Exam conducted with a chaperone present.   Constitutional:       General: He is not in acute distress.     Appearance: Normal appearance. He is obese. He is not ill-appearing, toxic-appearing or diaphoretic.      Comments: In a seated walker   HENT:      Head: Normocephalic.      Right Ear: Tympanic membrane, ear canal and external ear normal. There is no impacted cerumen.      Left Ear: Tympanic membrane, ear canal and external ear normal. There is no impacted cerumen.      Nose: Nose normal. No congestion or rhinorrhea.      Mouth/Throat:      Mouth: Mucous membranes are moist.      Pharynx: Oropharynx is clear. No oropharyngeal exudate or " posterior oropharyngeal erythema.   Eyes:      General: No scleral icterus.        Right eye: No discharge.         Left eye: No discharge.      Extraocular Movements: Extraocular movements intact.      Conjunctiva/sclera: Conjunctivae normal.      Pupils: Pupils are equal, round, and reactive to light.   Cardiovascular:      Rate and Rhythm: Normal rate and regular rhythm.      Pulses: Normal pulses.      Heart sounds: Normal heart sounds. No murmur heard.     No friction rub. No gallop.   Pulmonary:      Effort: Pulmonary effort is normal. No respiratory distress.      Breath sounds: Normal breath sounds. No stridor. No wheezing, rhonchi or rales.   Chest:      Chest wall: No tenderness.   Musculoskeletal:         General: Normal range of motion.      Cervical back: Normal range of motion and neck supple. No rigidity or tenderness.      Right lower leg: Edema present.      Left lower leg: Edema present.   Lymphadenopathy:      Cervical: No cervical adenopathy.   Skin:     General: Skin is warm and dry.      Coloration: Skin is not jaundiced.   Neurological:      General: No focal deficit present.      Mental Status: He is alert and oriented to person, place, and time.      Sensory: No sensory deficit.      Motor: No weakness.      Coordination: Coordination normal.   Psychiatric:         Mood and Affect: Mood normal.         Behavior: Behavior normal.         Thought Content: Thought content normal.         Judgment: Judgment normal.         Assessment/Plan   Problem List Items Addressed This Visit             ICD-10-CM    Chronic bilateral low back pain without sciatica M54.50, G89.29    Essential (primary) hypertension I10     Stable on current medications  Continue meds and exercise.   Hypertension Plan  Continue current treatment regimen.  On listed meds for BP and doing well at this time.  Medication changes per orders.  Dietary sodium restriction.  Regular aerobic exercise.  Weight loss.  Reduce  stress.  Patient Education: Reviewed risks of hypertension and principles of   treatment.             Hyperlipidemia associated with type 2 diabetes mellitus (Multi) E11.69, E78.5     Resume medications         Relevant Medications    atorvastatin (Lipitor) 40 mg tablet    Morbid obesity with body mass index (BMI) of 40.0 or higher (Multi) E66.01    Diabetes mellitus with hyperglycemia, with long-term current use of insulin - Primary E11.65, Z79.4     Diabetes mellitus Type II, under poor but better control.  Discussed general issues about diabetes pathophysiology and management.  Counseling at today's visit: discussed the need for weight loss, focused on the need for regular aerobic exercise, focused on the need to adhere to the prescribed ADA diet, discussed the advantages of a diet low in carbohydrates, and reminded to check sugars regularly and to bring readings in at the time of the next visit.  Educational material distributed.  Addressed ADA diet.  Suggested low cholesterol diet.  Encouraged aerobic exercise.           Type 2 diabetes mellitus with diabetic neuropathy, with long-term current use of insulin E11.40, Z79.4     Diabetes mellitus Type II, under poor control.  Discussed general issues about diabetes pathophysiology and management.  Counseling at today's visit: discussed the need for weight loss, focused on the need for regular aerobic exercise, focused on the need to adhere to the prescribed ADA diet, discussed the advantages of a diet low in carbohydrates, and reminded to check sugars regularly and to bring readings in at the time of the next visit.  Educational material distributed.  Addressed ADA diet.  Suggested low cholesterol diet.  Encouraged aerobic exercise.           Relevant Medications    gabapentin (Neurontin) 600 mg tablet    Other Relevant Orders    POCT glycosylated hemoglobin (Hb A1C) manually resulted (Completed)    Supraventricular tachycardia (CMS-HCC) I47.10     Not taking  medications as prescribed per cardiology         PVD (peripheral vascular disease) with claudication (CMS-[facility]) I73.9     Other Visit Diagnoses         Codes    Need for vaccination     Z23    Relevant Orders    Flu vaccine, trivalent, preservative free, age 6 months and greater (Fluarix/Fluzone/Flulaval) (Completed)            Lab Results   Component Value Date    HGBA1C 8.5 (A) 11/11/2024     Current Outpatient Medications   Medication Sig Dispense Refill    blood sugar diagnostic (Accu-Chek Dorie Plus test strp) strip 1 strip 3 times a day. 100 strip 5    blood-glucose meter misc Use to check glucose levels 3 times a day as directed 1 each 0    cholecalciferol (Vitamin D3) 50 mcg (2,000 unit) capsule Take 2 capsules (100 mcg) by mouth once daily.      dapaglifloz propaned-metformin (Xigduo XR) 5-1,000 mg Take 1 tablet by mouth 2 times a day before meals. 180 tablet 0    DULoxetine (Cymbalta) 60 mg DR capsule TAKE ONE CAPSULE BY MOUTH ONE TIME DAILY 90 capsule 0    flash glucose sensor kit (FreeStyle Scotty 2 Sensor) kit Inject 1 each under the skin every 14 (fourteen) days. Use to test 4 times daily as directed. 2 each 11    insulin aspart (NovoLOG Flexpen U-100 Insulin) 100 unit/mL (3 mL) pen Inject 36 Units under the skin 3 times a day with meals. 105 mL 1    insulin degludec (Tresiba FlexTouch U-200) 200 unit/mL (3 mL) injection Inject 60 Units under the skin 2 times a day. 63 mL 1    lancets misc Use to check glucose levels 3 times a day as directed 100 each 0    medical cannabis Take 1 each by mouth.      metoprolol tartrate (Lopressor) 100 mg tablet Take 1 tablet (100 mg) by mouth once daily. 90 tablet 0    Mounjaro 15 mg/0.5 mL pen injector IINJECT 15MG UNDER THE SKIN ONCE A WEEK 6 mL 0    nabumetone (Relafen) 500 mg tablet Take 1 tablet (500 mg) by mouth once daily as needed for mild pain (1 - 3) or moderate pain (4 - 6). 90 tablet 0    pantoprazole (ProtoNix) 40 mg EC tablet Take 1 tablet (40 mg) by mouth  "2 times a day. 180 tablet 1    pen needle, diabetic (BD Ultra-Fine Micro Pen Needle) 32 gauge x 1/4\" needle USE 1 NEEDLE 5 TIMES A DAY WITH NOVOLOG AND TRESIBA 500 each 0    valsartan (Diovan) 320 mg tablet Take 1 tablet (320 mg) by mouth once daily. 90 tablet 0    atorvastatin (Lipitor) 40 mg tablet Take 1 tablet (40 mg) by mouth once daily. 90 tablet 1    gabapentin (Neurontin) 600 mg tablet Take 1 tablet (600 mg) by mouth 2 times a day. 180 tablet 0     No current facility-administered medications for this visit.         F/U in 3 months for HTN and Diabetes  Continue current medications  Call if any new concerns  Please get RSV vaccine at the pharmacy    "

## 2024-11-12 DIAGNOSIS — Z79.4 TYPE 2 DIABETES MELLITUS WITH DIABETIC NEUROPATHY, WITH LONG-TERM CURRENT USE OF INSULIN: Primary | ICD-10-CM

## 2024-11-12 DIAGNOSIS — E11.40 TYPE 2 DIABETES MELLITUS WITH DIABETIC NEUROPATHY, WITH LONG-TERM CURRENT USE OF INSULIN: Primary | ICD-10-CM

## 2024-11-13 ASSESSMENT — ENCOUNTER SYMPTOMS
NERVOUS/ANXIOUS: 1
ABDOMINAL PAIN: 0
HEADACHES: 1
POLYDIPSIA: 0
EYE ITCHING: 0
WHEEZING: 0
FLANK PAIN: 0
BLOOD IN STOOL: 0
DIZZINESS: 0
EYE REDNESS: 0
SLEEP DISTURBANCE: 1
HEMATURIA: 0
ADENOPATHY: 0
AGITATION: 1
CHEST TIGHTNESS: 0
EYE DISCHARGE: 0
RHINORRHEA: 0
COLOR CHANGE: 0
BACK PAIN: 1
COUGH: 0
FATIGUE: 0
NECK STIFFNESS: 0
ARTHRALGIAS: 1
PALPITATIONS: 0
EYE PAIN: 0
CHILLS: 0
VOICE CHANGE: 0
SINUS PAIN: 0
FREQUENCY: 0
FEVER: 0
DYSURIA: 0
APPETITE CHANGE: 1
VOMITING: 0
NAUSEA: 0
TROUBLE SWALLOWING: 0
FACIAL SWELLING: 0
DIAPHORESIS: 1
SINUS PRESSURE: 0
DIARRHEA: 0
WOUND: 0
UNEXPECTED WEIGHT CHANGE: 0
CONSTIPATION: 0
JOINT SWELLING: 0
NECK PAIN: 1
SHORTNESS OF BREATH: 0
SORE THROAT: 0
BRUISES/BLEEDS EASILY: 0
ACTIVITY CHANGE: 0
MYALGIAS: 1
POLYPHAGIA: 0

## 2024-11-14 ENCOUNTER — PATIENT OUTREACH (OUTPATIENT)
Dept: PRIMARY CARE | Facility: CLINIC | Age: 64
End: 2024-11-14
Payer: COMMERCIAL

## 2024-11-14 DIAGNOSIS — I10 ESSENTIAL (PRIMARY) HYPERTENSION: ICD-10-CM

## 2024-11-14 DIAGNOSIS — E11.40 TYPE 2 DIABETES MELLITUS WITH DIABETIC NEUROPATHY, WITH LONG-TERM CURRENT USE OF INSULIN: ICD-10-CM

## 2024-11-14 DIAGNOSIS — Z79.4 TYPE 2 DIABETES MELLITUS WITH DIABETIC NEUROPATHY, WITH LONG-TERM CURRENT USE OF INSULIN: ICD-10-CM

## 2024-11-14 NOTE — PROGRESS NOTES
Chart review completed prior to CCM outreach. Call attempted to patient's new number that he provided on Monday. No answer. Left message to call back.     Patient returned my call right away. He states he has been compliant with his medications for 2 days. He states that we were right about the withdrawal symptoms. He is feeling a little better at this time. He denies needing any scripts. He has not been checking his BP at home but is planning on coming to his nurse appt on Monday for a BP check.     Patient advised to call me with any questions or concerns.

## 2024-11-18 ENCOUNTER — APPOINTMENT (OUTPATIENT)
Dept: PRIMARY CARE | Facility: CLINIC | Age: 64
End: 2024-11-18
Payer: COMMERCIAL

## 2024-11-21 ENCOUNTER — DOCUMENTATION (OUTPATIENT)
Dept: PRIMARY CARE | Facility: CLINIC | Age: 64
End: 2024-11-21
Payer: COMMERCIAL

## 2024-11-21 NOTE — PROGRESS NOTES
Spoke to patient on Monday 11/18. He had to reschedule his BP check appt because his wife was sick and could not bring him. He did state he was compliant on his medications now. I asked that he please be checking his BP's at home and to notify me if he is getting any really low numbers or really high numbers. Meanwhile, he is rescheduled for a BP check with the nurse next Wednesday.

## 2024-11-22 ENCOUNTER — TELEPHONE (OUTPATIENT)
Dept: PRIMARY CARE | Facility: CLINIC | Age: 64
End: 2024-11-22

## 2024-11-22 ENCOUNTER — TELEMEDICINE (OUTPATIENT)
Dept: PHARMACY | Facility: HOSPITAL | Age: 64
End: 2024-11-22
Payer: COMMERCIAL

## 2024-11-22 DIAGNOSIS — E11.40 TYPE 2 DIABETES MELLITUS WITH DIABETIC NEUROPATHY, WITH LONG-TERM CURRENT USE OF INSULIN: ICD-10-CM

## 2024-11-22 DIAGNOSIS — Z79.4 TYPE 2 DIABETES MELLITUS WITH DIABETIC NEUROPATHY, WITH LONG-TERM CURRENT USE OF INSULIN: ICD-10-CM

## 2024-11-22 RX ORDER — INSULIN DEGLUDEC 200 U/ML
68 INJECTION, SOLUTION SUBCUTANEOUS 2 TIMES DAILY
Qty: 63 ML | Refills: 1 | Status: SHIPPED | OUTPATIENT
Start: 2024-11-22

## 2024-11-22 NOTE — PROGRESS NOTES
"Patient is sent at the request of Alis Jones MD for my opinion regarding Type 2 diabetes.  My final recommendations will be communicated back to the requesting provider by way of shared medical record.    Subjective     HPI    Past Medical History:  He has a past medical history of Anemia, Foot ulcer due to secondary DM (Multi) (06/03/2024), Personal history of other endocrine, nutritional and metabolic disease (09/17/2021), Personal history of other infectious and parasitic diseases (11/12/2021), and Personal history of other specified conditions (01/18/2022).    Past Surgical History:  He has no past surgical history on file.    Social History:  He reports that he has never smoked. He has never been exposed to tobacco smoke. He has never used smokeless tobacco. He reports that he does not currently use alcohol. He reports that he does not use drugs.    Family History:  Family History   Problem Relation Name Age of Onset    Other (cardiac disorder) Father      Other (cerebrovascular accident) Father      Cancer Father      Thyroid disease Other         Allergies:  Patient has no known allergies.    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  He is filling his Scotty through [employer] and has not started with Dexcom. OK to continue.     Hypoglycemia frequency: Some symptoms of lows this week in the morning. Treats with half a glass of soda or chewable candies, then have protein and a slower absorbing carb.   Hypoglycemia awareness: Yes     Reports some cramping. Will focus on hydration.     Adverse Effects: none     Objective     Last Recorded Vitals:  BP Readings from Last 6 Encounters:   11/11/24 111/75   08/01/24 112/71   06/03/24 99/67   04/25/24 93/66   01/17/24 110/72 " "  11/09/23 140/74        Wt Readings from Last 6 Encounters:   11/11/24 147 kg (323 lb 12.8 oz)   08/01/24 146 kg (321 lb 9.6 oz)   06/03/24 147 kg (324 lb 8 oz)   04/25/24 147 kg (324 lb 4 oz)   01/17/24 148 kg (327 lb 2 oz)   11/09/23 (!) 152 kg (335 lb 6 oz)     Diabetes Pharmacotherapy:  Xigduo 5-1000 BID  Tresiba U-200 60 units BID  Novolog U-100 36 units TID WM  Mounjaro 15 mg (newly increased) weekly    Primary/Secondary Prevention   - Statin? Yes  - ACE-I/ARB? Yes  - Aspirin? No    Pertinent PMH Review:  - PMH of Pancreatitis: No  - PMH of Retinopathy: No  - PMH of Urinary Tract Infections: No  - PMH of MTC: No    Lab Review  Lab Results   Component Value Date    BILITOT 0.7 04/25/2024    CALCIUM 9.3 11/04/2024    CO2 23 11/04/2024     11/04/2024    CREATININE 0.83 11/04/2024    GLUCOSE 236 (H) 11/04/2024    ALKPHOS 71 04/25/2024    K 4.1 11/04/2024    PROT 8.0 04/25/2024     11/04/2024    AST 22 04/25/2024    ALT 23 04/25/2024    BUN 12 11/04/2024    ANIONGAP 16 11/04/2024    MG 2.54 (H) 01/17/2024    ALBUMIN 4.5 04/25/2024    GFRMALE 69 11/11/2022     Lab Results   Component Value Date    TRIG 199 (H) 11/04/2024    CHOL 206 (H) 11/04/2024    LDLCALC 123 (H) 11/04/2024    HDL 43.5 11/04/2024     Lab Results   Component Value Date    HGBA1C 8.5 (A) 11/11/2024    HGBA1C 9.7 (A) 08/01/2024    HGBA1C 10.1 (H) 04/25/2024     No components found for: \"UACR\"  The 10-year ASCVD risk score (Sury DK, et al., 2019) is: 19.1%    Values used to calculate the score:      Age: 64 years      Sex: Male      Is Non- : No      Diabetic: Yes      Tobacco smoker: No      Systolic Blood Pressure: 111 mmHg      Is BP treated: Yes      HDL Cholesterol: 43.5 mg/dL      Total Cholesterol: 206 mg/dL    Health Maintenance:   Foot Exam: performs regularly, due at next PCP appt, has lost a toenail recently, some neuropathy  Eye Exam: 4 months ago  Lipid Panel: updated  Influenza Immunization: " updated  Pneumonia Immunization: Done, due at age 67    Drug Interactions:  None to consider at this time    Assessment/Plan   Problem List Items Addressed This Visit       Type 2 diabetes mellitus with diabetic neuropathy, with long-term current use of insulin    Relevant Medications    insulin degludec (Tresiba FlexTouch U-200) 200 unit/mL (3 mL) injection    Other Relevant Orders    Referral to Clinical Pharmacy     Patients diabetes is improved, however with most recent A1c of 8.5% (Goal < 7%).   No lows reported. Increase Tresiba to 68 units twice daily; reordered.   Reminded to adhere to mealtime insulin- taking mealtime insulin at most 15 minutes before meals instead of after to allow the body to more effectively process BG.   Goal to increase time in range to > 80 with no readings above 240  Atorvastatin was started > 3 months ago with elevated LDL in January. Goal LDL < 100. Assess adherence and consider dose increase at next visit.   Compliance at present is estimated to be good. Efforts to improve compliance will be directed at regular blood sugar monitorin times daily.   Education Provided to Patient: Continue BG monitoring, consider exercise  Follow-up: I recommend diabetes care be 1 month. 24 @ 1340  PCP Follow-Up:24    Wilfrido Mckeon, lAma    Continue all meds under the continuation of care with the referring provider and clinical pharmacy team.

## 2024-11-22 NOTE — TELEPHONE ENCOUNTER
Reza pharmacy tech. Called from [facility] asking about a Dr. Jones patient. He stated that the patients insurance will only approve 90 or 30 day supply and currently the prescription is written as a 92 day supply. So the pharmacy is requesting for the order be altered from 60 units to 70 units twice a day instead. Please advise    [facility]: [phone]

## 2024-11-23 DIAGNOSIS — Z79.4 TYPE 2 DIABETES MELLITUS WITH DIABETIC NEUROPATHY, WITH LONG-TERM CURRENT USE OF INSULIN: ICD-10-CM

## 2024-11-23 DIAGNOSIS — E11.40 TYPE 2 DIABETES MELLITUS WITH DIABETIC NEUROPATHY, WITH LONG-TERM CURRENT USE OF INSULIN: ICD-10-CM

## 2024-11-25 ENCOUNTER — PATIENT OUTREACH (OUTPATIENT)
Dept: PRIMARY CARE | Facility: CLINIC | Age: 64
End: 2024-11-25
Payer: COMMERCIAL

## 2024-11-25 DIAGNOSIS — E11.40 TYPE 2 DIABETES MELLITUS WITH DIABETIC NEUROPATHY, WITH LONG-TERM CURRENT USE OF INSULIN: ICD-10-CM

## 2024-11-25 DIAGNOSIS — Z79.4 TYPE 2 DIABETES MELLITUS WITH DIABETIC NEUROPATHY, WITH LONG-TERM CURRENT USE OF INSULIN: ICD-10-CM

## 2024-11-25 DIAGNOSIS — I10 ESSENTIAL (PRIMARY) HYPERTENSION: ICD-10-CM

## 2024-11-25 PROCEDURE — 99490 CHRNC CARE MGMT STAFF 1ST 20: CPT | Performed by: FAMILY MEDICINE

## 2024-11-25 RX ORDER — BLOOD SUGAR DIAGNOSTIC
STRIP MISCELLANEOUS
Qty: 500 EACH | Refills: 0 | Status: SHIPPED | OUTPATIENT
Start: 2024-11-25

## 2024-11-25 NOTE — PROGRESS NOTES
Patient called this morning asking for a refill of his pen needles. This was sent to [facility] who is covering this patient for Dr. Jones and I see that it was sent. Patient also confirmed he is keeping his BP check appt with me on Wednesday.

## 2024-11-27 ENCOUNTER — APPOINTMENT (OUTPATIENT)
Dept: PRIMARY CARE | Facility: CLINIC | Age: 64
End: 2024-11-27
Payer: COMMERCIAL

## 2024-11-27 VITALS
DIASTOLIC BLOOD PRESSURE: 79 MMHG | HEIGHT: 71 IN | SYSTOLIC BLOOD PRESSURE: 117 MMHG | BODY MASS INDEX: 44.1 KG/M2 | TEMPERATURE: 97.9 F | WEIGHT: 315 LBS | HEART RATE: 96 BPM | OXYGEN SATURATION: 99 %

## 2024-12-16 ENCOUNTER — PATIENT OUTREACH (OUTPATIENT)
Dept: PRIMARY CARE | Facility: CLINIC | Age: 64
End: 2024-12-16
Payer: COMMERCIAL

## 2024-12-16 DIAGNOSIS — E11.40 TYPE 2 DIABETES MELLITUS WITH DIABETIC NEUROPATHY, WITH LONG-TERM CURRENT USE OF INSULIN: ICD-10-CM

## 2024-12-16 DIAGNOSIS — I10 ESSENTIAL (PRIMARY) HYPERTENSION: ICD-10-CM

## 2024-12-16 DIAGNOSIS — Z79.4 TYPE 2 DIABETES MELLITUS WITH DIABETIC NEUROPATHY, WITH LONG-TERM CURRENT USE OF INSULIN: ICD-10-CM

## 2024-12-17 NOTE — PROGRESS NOTES
"Patient is sent at the request of Alis Jones MD for my opinion regarding Type 2 diabetes.  My final recommendations will be communicated back to the requesting provider by way of shared medical record.    Subjective     HPI    Past Medical History:  He has a past medical history of Anemia, Foot ulcer due to secondary DM (Multi) (06/03/2024), Personal history of other endocrine, nutritional and metabolic disease (09/17/2021), Personal history of other infectious and parasitic diseases (11/12/2021), and Personal history of other specified conditions (01/18/2022).    Past Surgical History:  He has no past surgical history on file.    Social History:  He reports that he has never smoked. He has never been exposed to tobacco smoke. He has never used smokeless tobacco. He reports that he does not currently use alcohol. He reports that he does not use drugs.    Family History:  Family History   Problem Relation Name Age of Onset    Other (cardiac disorder) Father      Other (cerebrovascular accident) Father      Cancer Father      Thyroid disease Other         Allergies:  Patient has no known allergies.    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  He is filling his Scotty through [employer].    Hypoglycemia frequency: none reported  Hypoglycemia awareness: Yes     Adverse Effects: none     Objective     Last Recorded Vitals:  BP Readings from Last 6 Encounters:   11/27/24 117/79   11/11/24 111/75   08/01/24 112/71   06/03/24 99/67   04/25/24 93/66   01/17/24 110/72        Wt Readings from Last 6 Encounters:   11/27/24 146 kg (321 lb 3.2 oz)   11/11/24 147 kg (323 lb 12.8 oz)   08/01/24 146 kg (321 lb 9.6 oz)   06/03/24 147 kg (324 lb 8 oz)   04/25/24 147 kg (324 lb 4 oz)   01/17/24 148 " "kg (327 lb 2 oz)     Diabetes Pharmacotherapy:  Xigduo 5-1000 BID  Tresiba U-200 68 units BID  Novolog U-100 36 units TID WM  Mounjaro 15 mg weekly    Primary/Secondary Prevention   - Statin? Yes  - ACE-I/ARB? Yes  - Aspirin? No    Pertinent PMH Review:  - PMH of Pancreatitis: No  - PMH of Retinopathy: No  - PMH of Urinary Tract Infections: No  - PMH of MTC: No    Lab Review  Lab Results   Component Value Date    BILITOT 0.7 04/25/2024    CALCIUM 9.3 11/04/2024    CO2 23 11/04/2024     11/04/2024    CREATININE 0.83 11/04/2024    GLUCOSE 236 (H) 11/04/2024    ALKPHOS 71 04/25/2024    K 4.1 11/04/2024    PROT 8.0 04/25/2024     11/04/2024    AST 22 04/25/2024    ALT 23 04/25/2024    BUN 12 11/04/2024    ANIONGAP 16 11/04/2024    MG 2.54 (H) 01/17/2024    ALBUMIN 4.5 04/25/2024    GFRMALE 69 11/11/2022     Lab Results   Component Value Date    TRIG 199 (H) 11/04/2024    CHOL 206 (H) 11/04/2024    LDLCALC 123 (H) 11/04/2024    HDL 43.5 11/04/2024     Lab Results   Component Value Date    HGBA1C 8.5 (A) 11/11/2024    HGBA1C 9.7 (A) 08/01/2024    HGBA1C 10.1 (H) 04/25/2024     No components found for: \"UACR\"  The 10-year ASCVD risk score (Sury SMALL, et al., 2019) is: 20.7%    Values used to calculate the score:      Age: 64 years      Sex: Male      Is Non- : No      Diabetic: Yes      Tobacco smoker: No      Systolic Blood Pressure: 117 mmHg      Is BP treated: Yes      HDL Cholesterol: 43.5 mg/dL      Total Cholesterol: 206 mg/dL    Health Maintenance:   Foot Exam: performs regularly, due at next PCP appt, has lost a toenail recently, some neuropathy  Eye Exam: 5 months ago  Lipid Panel: updated  Influenza Immunization: updated  Pneumonia Immunization: Done, due at age 67    Drug Interactions:  None to consider at this time    Assessment/Plan   Problem List Items Addressed This Visit       Hyperlipidemia associated with type 2 diabetes mellitus (Multi)    Relevant Medications    " atorvastatin (Lipitor) 80 mg tablet    Type 2 diabetes mellitus with diabetic neuropathy, with long-term current use of insulin - Primary    Relevant Medications    atorvastatin (Lipitor) 80 mg tablet    Other Relevant Orders    Referral to Clinical Pharmacy     ASSESSMENT:    Patients diabetes is improved, however with most recent A1c of 8.5% (Goal < 7%).    Patient reports that he has been adherent to all medications and has been feeling much better since doing so. Reports that he did increase his Tresiba dose as discussed at last visit with clinical pharmacist and that he has been using his mealtime insulin consistently. Reports that his average BG is usually 180-200, which is still elevated, but improved. Discussed staying consistent on his current regimen for another month and will assess further insulin adjustments at that time.     Discussed cholesterol remaining elevated () since starting statin therapy > 3 months ago. Will increase dose to atorvastatin 80 mg to optimize statin therapy. Goal LDL < 100. Discussed monitoring for side effects such as muscle aches and pains. Patient aware to call pharmacist with questions or concerns prior to next visit.       PLAN:    CONTINUE:  Xigduo 5-1000 BID  Tresiba U-200 68 units BID  Novolog U-100 36 units TID WM  Mounjaro 15 mg (newly increased) weekly    INCREASE: Atorvastatin to 80 mg daily      Follow up: 1/20/24 @ 1:40      Norma Woodall, PharmD    Continue all meds under the continuation of care with the referring provider and clinical pharmacy team.

## 2024-12-20 ENCOUNTER — APPOINTMENT (OUTPATIENT)
Dept: PHARMACY | Facility: HOSPITAL | Age: 64
End: 2024-12-20
Payer: COMMERCIAL

## 2024-12-20 DIAGNOSIS — E11.40 TYPE 2 DIABETES MELLITUS WITH DIABETIC NEUROPATHY, WITH LONG-TERM CURRENT USE OF INSULIN: Primary | ICD-10-CM

## 2024-12-20 DIAGNOSIS — E11.69 HYPERLIPIDEMIA ASSOCIATED WITH TYPE 2 DIABETES MELLITUS (MULTI): ICD-10-CM

## 2024-12-20 DIAGNOSIS — E78.5 HYPERLIPIDEMIA ASSOCIATED WITH TYPE 2 DIABETES MELLITUS (MULTI): ICD-10-CM

## 2024-12-20 DIAGNOSIS — Z79.4 TYPE 2 DIABETES MELLITUS WITH DIABETIC NEUROPATHY, WITH LONG-TERM CURRENT USE OF INSULIN: Primary | ICD-10-CM

## 2024-12-20 RX ORDER — ATORVASTATIN CALCIUM 80 MG/1
80 TABLET, FILM COATED ORAL DAILY
Qty: 90 TABLET | Refills: 3 | Status: SHIPPED | OUTPATIENT
Start: 2024-12-20 | End: 2025-12-20

## 2024-12-30 DIAGNOSIS — M25.561 CHRONIC PAIN OF BOTH KNEES: ICD-10-CM

## 2024-12-30 DIAGNOSIS — M54.50 CHRONIC BILATERAL LOW BACK PAIN WITHOUT SCIATICA: ICD-10-CM

## 2024-12-30 DIAGNOSIS — G89.29 CHRONIC PAIN OF BOTH KNEES: ICD-10-CM

## 2024-12-30 DIAGNOSIS — G89.29 CHRONIC BILATERAL LOW BACK PAIN WITHOUT SCIATICA: ICD-10-CM

## 2024-12-30 DIAGNOSIS — M25.562 CHRONIC PAIN OF BOTH KNEES: ICD-10-CM

## 2024-12-30 RX ORDER — NABUMETONE 500 MG/1
TABLET, FILM COATED ORAL
Qty: 90 TABLET | Refills: 0 | Status: SHIPPED | OUTPATIENT
Start: 2024-12-30

## 2025-01-17 NOTE — PROGRESS NOTES
"Patient is sent at the request of Alis Jones MD for my opinion regarding Type 2 diabetes.  My final recommendations will be communicated back to the requesting provider by way of shared medical record.    Subjective     HPI    Past Medical History:  He has a past medical history of Anemia, Foot ulcer due to secondary DM (Multi) (06/03/2024), Personal history of other endocrine, nutritional and metabolic disease (09/17/2021), Personal history of other infectious and parasitic diseases (11/12/2021), and Personal history of other specified conditions (01/18/2022).    Past Surgical History:  He has no past surgical history on file.    Social History:  He reports that he has never smoked. He has never been exposed to tobacco smoke. He has never used smokeless tobacco. He reports that he does not currently use alcohol. He reports that he does not use drugs.    Family History:  Family History   Problem Relation Name Age of Onset    Other (cardiac disorder) Father      Other (cerebrovascular accident) Father      Cancer Father      Thyroid disease Other         Allergies:  Patient has no known allergies.    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  He is filling his Scotty through [employer].    Hypoglycemia frequency: none reported  Hypoglycemia awareness: Yes     Adverse Effects: none     Objective     Last Recorded Vitals:  BP Readings from Last 6 Encounters:   11/27/24 117/79   11/11/24 111/75   08/01/24 112/71   06/03/24 99/67   04/25/24 93/66   01/17/24 110/72        Wt Readings from Last 6 Encounters:   11/27/24 146 kg (321 lb 3.2 oz)   11/11/24 147 kg (323 lb 12.8 oz)   08/01/24 146 kg (321 lb 9.6 oz)   06/03/24 147 kg (324 lb 8 oz)   04/25/24 147 kg (324 lb 4 oz)   01/17/24 148 " "kg (327 lb 2 oz)     Diabetes Pharmacotherapy:  Xigduo 5-1000 BID  Tresiba U-200 68 units BID  Novolog U-100 36 units TID WM  Mounjaro 15 mg weekly    Primary/Secondary Prevention   - Statin? Yes - Atorvastatin 80 mg   - ACE-I/ARB? Yes  - Aspirin? No    Pertinent PMH Review:  - PMH of Pancreatitis: No  - PMH of Retinopathy: No  - PMH of Urinary Tract Infections: No  - PMH of MTC: No    Lab Review  Lab Results   Component Value Date    BILITOT 0.7 04/25/2024    CALCIUM 9.3 11/04/2024    CO2 23 11/04/2024     11/04/2024    CREATININE 0.83 11/04/2024    GLUCOSE 236 (H) 11/04/2024    ALKPHOS 71 04/25/2024    K 4.1 11/04/2024    PROT 8.0 04/25/2024     11/04/2024    AST 22 04/25/2024    ALT 23 04/25/2024    BUN 12 11/04/2024    ANIONGAP 16 11/04/2024    MG 2.54 (H) 01/17/2024    ALBUMIN 4.5 04/25/2024    GFRMALE 69 11/11/2022     Lab Results   Component Value Date    TRIG 199 (H) 11/04/2024    CHOL 206 (H) 11/04/2024    LDLCALC 123 (H) 11/04/2024    HDL 43.5 11/04/2024     Lab Results   Component Value Date    HGBA1C 8.5 (A) 11/11/2024    HGBA1C 9.7 (A) 08/01/2024    HGBA1C 10.1 (H) 04/25/2024     No components found for: \"UACR\"  The 10-year ASCVD risk score (Sury DK, et al., 2019) is: 20.7%    Values used to calculate the score:      Age: 64 years      Sex: Male      Is Non- : No      Diabetic: Yes      Tobacco smoker: No      Systolic Blood Pressure: 117 mmHg      Is BP treated: Yes      HDL Cholesterol: 43.5 mg/dL      Total Cholesterol: 206 mg/dL    Health Maintenance:   Foot Exam: performs regularly, due at next PCP appt, has lost a toenail recently, some neuropathy  Eye Exam: 5 months ago  Lipid Panel: updated  Influenza Immunization: updated  Pneumonia Immunization: Done, due at age 67    Drug Interactions:  None to consider at this time    Assessment/Plan   Problem List Items Addressed This Visit       Type 2 diabetes mellitus with diabetic neuropathy, with long-term current " use of insulin - Primary    Relevant Orders    Referral to Clinical Pharmacy       ASSESSMENT:    Patients diabetes is improved, however with most recent A1c of 8.5% (Goal < 7%).    Patient reports he has not been using his CGM to track BG. Reports self-titrating his Tresiba dose from 68 units BID to 80 units BID and has been taking Novolog 40 units BID rather than 36 units TID as prescribed. Due to lack of BG readings, unsure of how this has affected BG values. Patient reports feeling fine with no symptoms of hypoglycemia.     Patient is agreeable to restart Humalog dose of 36 units TID with meals and Tresiba 70 units BID (requested an even number of units for memory purposes). Wrote down insulin doses to remember how much to take and was putting on a new CGM sensor this afternoon. Will follow up in 2 weeks to assess insulin adjustment and BG values. Patient aware to contact pharmacist with questions, concerns, or hypoglycemia.       PLAN:  Decrease Tresiba to 70 units BID  Increase Humalog to 36 units TID WM      CONTINUE:  Xigduo 5-1000 BID  Mounjaro 15 mg weekly      Follow up: 2/3/25 @ 2:00    Norma Woodall, PharmD    Continue all meds under the continuation of care with the referring provider and clinical pharmacy team.

## 2025-01-20 ENCOUNTER — APPOINTMENT (OUTPATIENT)
Dept: PHARMACY | Facility: HOSPITAL | Age: 65
End: 2025-01-20
Payer: COMMERCIAL

## 2025-01-20 DIAGNOSIS — Z79.4 TYPE 2 DIABETES MELLITUS WITH DIABETIC NEUROPATHY, WITH LONG-TERM CURRENT USE OF INSULIN: Primary | ICD-10-CM

## 2025-01-20 DIAGNOSIS — E11.40 TYPE 2 DIABETES MELLITUS WITH DIABETIC NEUROPATHY, WITH LONG-TERM CURRENT USE OF INSULIN: Primary | ICD-10-CM

## 2025-01-30 DIAGNOSIS — F33.9 CHRONIC MAJOR DEPRESSIVE DISORDER, RECURRENT EPISODE (CMS-HCC): ICD-10-CM

## 2025-01-31 RX ORDER — DULOXETIN HYDROCHLORIDE 60 MG/1
60 CAPSULE, DELAYED RELEASE ORAL DAILY
Qty: 90 CAPSULE | Refills: 0 | Status: SHIPPED | OUTPATIENT
Start: 2025-01-31

## 2025-01-31 NOTE — PROGRESS NOTES
"Patient is sent at the request of Alis Jones MD for my opinion regarding Type 2 diabetes.  My final recommendations will be communicated back to the requesting provider by way of shared medical record.    Subjective     HPI    Past Medical History:  He has a past medical history of Anemia, Foot ulcer due to secondary DM (Multi) (06/03/2024), Personal history of other endocrine, nutritional and metabolic disease (09/17/2021), Personal history of other infectious and parasitic diseases (11/12/2021), and Personal history of other specified conditions (01/18/2022).    Past Surgical History:  He has no past surgical history on file.    Social History:  He reports that he has never smoked. He has never been exposed to tobacco smoke. He has never used smokeless tobacco. He reports that he does not currently use alcohol. He reports that he does not use drugs.    Family History:  Family History   Problem Relation Name Age of Onset    Other (cardiac disorder) Father      Other (cerebrovascular accident) Father      Cancer Father      Thyroid disease Other         Allergies:  Patient has no known allergies.    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  He is filling his Scotty through [employer].    Hypoglycemia frequency: none reported  Hypoglycemia awareness: Yes     Adverse Effects: none     Objective     Last Recorded Vitals:  BP Readings from Last 6 Encounters:   11/27/24 117/79   11/11/24 111/75   08/01/24 112/71   06/03/24 99/67   04/25/24 93/66   01/17/24 110/72        Wt Readings from Last 6 Encounters:   11/27/24 146 kg (321 lb 3.2 oz)   11/11/24 147 kg (323 lb 12.8 oz)   08/01/24 146 kg (321 lb 9.6 oz)   06/03/24 147 kg (324 lb 8 oz)   04/25/24 147 kg (324 lb 4 oz)   01/17/24 148 " "kg (327 lb 2 oz)     Diabetes Pharmacotherapy:  Xigduo 5-1000 BID  Tresiba U-200 70 units BID  Novolog U-100 36 units TID WM  Mounjaro 15 mg weekly    Primary/Secondary Prevention   - Statin? Yes - Atorvastatin 80 mg   - ACE-I/ARB? Yes  - Aspirin? No    Pertinent PMH Review:  - PMH of Pancreatitis: No  - PMH of Retinopathy: No  - PMH of Urinary Tract Infections: No  - PMH of MTC: No    Lab Review  Lab Results   Component Value Date    BILITOT 0.7 04/25/2024    CALCIUM 9.3 11/04/2024    CO2 23 11/04/2024     11/04/2024    CREATININE 0.83 11/04/2024    GLUCOSE 236 (H) 11/04/2024    ALKPHOS 71 04/25/2024    K 4.1 11/04/2024    PROT 8.0 04/25/2024     11/04/2024    AST 22 04/25/2024    ALT 23 04/25/2024    BUN 12 11/04/2024    ANIONGAP 16 11/04/2024    MG 2.54 (H) 01/17/2024    ALBUMIN 4.5 04/25/2024    GFRMALE 69 11/11/2022     Lab Results   Component Value Date    TRIG 199 (H) 11/04/2024    CHOL 206 (H) 11/04/2024    LDLCALC 123 (H) 11/04/2024    HDL 43.5 11/04/2024     Lab Results   Component Value Date    HGBA1C 8.5 (A) 11/11/2024    HGBA1C 9.7 (A) 08/01/2024    HGBA1C 10.1 (H) 04/25/2024     No components found for: \"UACR\"  The 10-year ASCVD risk score (Sury DK, et al., 2019) is: 20.7%    Values used to calculate the score:      Age: 64 years      Sex: Male      Is Non- : No      Diabetic: Yes      Tobacco smoker: No      Systolic Blood Pressure: 117 mmHg      Is BP treated: Yes      HDL Cholesterol: 43.5 mg/dL      Total Cholesterol: 206 mg/dL    Health Maintenance:   Foot Exam: performs regularly, due at next PCP appt, has lost a toenail recently, some neuropathy  Eye Exam: 5 months ago  Lipid Panel: updated  Influenza Immunization: updated  Pneumonia Immunization: Done, due at age 67    Drug Interactions:  None to consider at this time    Assessment/Plan   Problem List Items Addressed This Visit       Type 2 diabetes mellitus with diabetic neuropathy, with long-term current " use of insulin - Primary    Relevant Orders    Referral to Clinical Pharmacy         ASSESSMENT:    Patients diabetes is poorly controlled, however improved, with most recent A1c of 8.5% (Goal < 7%).    Has been using 72 units of tresiba BID. No instances of hypoglycemia reported.    FBG are typically around 178 in the morning. Reports diet has been consistent while trying to eat healthier meals. Discussed FBG goal of  in the morning. Patient agreeable to increase Tresiba by ~10% to 80 units BID. Will discuss obtaining updated A1c at next visit.    PLAN:  Increase Tresiba to 80 units BID  Continue Humalog 36 units TID WM      CONTINUE:  Xigduo 5-1000 BID  Mounjaro 15 mg weekly      Follow up: 3/3/25 @ 10:20    Norma Woodall, PharmD    Continue all meds under the continuation of care with the referring provider and clinical pharmacy team.

## 2025-02-03 ENCOUNTER — APPOINTMENT (OUTPATIENT)
Dept: PHARMACY | Facility: HOSPITAL | Age: 65
End: 2025-02-03
Payer: COMMERCIAL

## 2025-02-03 DIAGNOSIS — Z79.4 TYPE 2 DIABETES MELLITUS WITH DIABETIC NEUROPATHY, WITH LONG-TERM CURRENT USE OF INSULIN: Primary | ICD-10-CM

## 2025-02-03 DIAGNOSIS — E11.40 TYPE 2 DIABETES MELLITUS WITH DIABETIC NEUROPATHY, WITH LONG-TERM CURRENT USE OF INSULIN: Primary | ICD-10-CM

## 2025-02-18 ENCOUNTER — TELEPHONE (OUTPATIENT)
Dept: PRIMARY CARE | Facility: CLINIC | Age: 65
End: 2025-02-18

## 2025-02-18 ENCOUNTER — APPOINTMENT (OUTPATIENT)
Dept: PRIMARY CARE | Facility: CLINIC | Age: 65
End: 2025-02-18
Payer: COMMERCIAL

## 2025-02-18 VITALS
HEART RATE: 90 BPM | TEMPERATURE: 98.4 F | WEIGHT: 315 LBS | OXYGEN SATURATION: 95 % | BODY MASS INDEX: 44.1 KG/M2 | DIASTOLIC BLOOD PRESSURE: 86 MMHG | HEIGHT: 71 IN | SYSTOLIC BLOOD PRESSURE: 126 MMHG

## 2025-02-18 DIAGNOSIS — Z79.4 TYPE 2 DIABETES MELLITUS WITH HYPERGLYCEMIA, WITH LONG-TERM CURRENT USE OF INSULIN: Primary | ICD-10-CM

## 2025-02-18 DIAGNOSIS — E11.69 HYPERLIPIDEMIA ASSOCIATED WITH TYPE 2 DIABETES MELLITUS (MULTI): ICD-10-CM

## 2025-02-18 DIAGNOSIS — E66.01 MORBID OBESITY WITH BODY MASS INDEX (BMI) OF 40.0 OR HIGHER (MULTI): ICD-10-CM

## 2025-02-18 DIAGNOSIS — E11.40 TYPE 2 DIABETES MELLITUS WITH DIABETIC NEUROPATHY, WITH LONG-TERM CURRENT USE OF INSULIN: ICD-10-CM

## 2025-02-18 DIAGNOSIS — I47.10 SVT (SUPRAVENTRICULAR TACHYCARDIA) (CMS-HCC): ICD-10-CM

## 2025-02-18 DIAGNOSIS — E11.65 TYPE 2 DIABETES MELLITUS WITH HYPERGLYCEMIA, WITH LONG-TERM CURRENT USE OF INSULIN: Primary | ICD-10-CM

## 2025-02-18 DIAGNOSIS — E78.5 HYPERLIPIDEMIA ASSOCIATED WITH TYPE 2 DIABETES MELLITUS (MULTI): ICD-10-CM

## 2025-02-18 DIAGNOSIS — Z79.4 TYPE 2 DIABETES MELLITUS WITH DIABETIC NEUROPATHY, WITH LONG-TERM CURRENT USE OF INSULIN: ICD-10-CM

## 2025-02-18 DIAGNOSIS — R73.9 HYPERGLYCEMIA: ICD-10-CM

## 2025-02-18 LAB
POC FINGERSTICK BLOOD GLUCOSE: 198 MG/DL (ref 70–100)
POC HEMOGLOBIN A1C: 8.4 % (ref 4.2–6.5)

## 2025-02-18 PROCEDURE — 99215 OFFICE O/P EST HI 40 MIN: CPT | Performed by: FAMILY MEDICINE

## 2025-02-18 PROCEDURE — 3074F SYST BP LT 130 MM HG: CPT | Performed by: FAMILY MEDICINE

## 2025-02-18 PROCEDURE — 4010F ACE/ARB THERAPY RXD/TAKEN: CPT | Performed by: FAMILY MEDICINE

## 2025-02-18 PROCEDURE — 3008F BODY MASS INDEX DOCD: CPT | Performed by: FAMILY MEDICINE

## 2025-02-18 PROCEDURE — 82962 GLUCOSE BLOOD TEST: CPT | Performed by: FAMILY MEDICINE

## 2025-02-18 PROCEDURE — 83036 HEMOGLOBIN GLYCOSYLATED A1C: CPT | Performed by: FAMILY MEDICINE

## 2025-02-18 PROCEDURE — 3079F DIAST BP 80-89 MM HG: CPT | Performed by: FAMILY MEDICINE

## 2025-02-18 RX ORDER — TIRZEPATIDE 15 MG/.5ML
15 INJECTION, SOLUTION SUBCUTANEOUS
Qty: 6 ML | Refills: 1 | Status: SHIPPED | OUTPATIENT
Start: 2025-02-18 | End: 2025-02-18

## 2025-02-18 RX ORDER — TIRZEPATIDE 15 MG/.5ML
15 INJECTION, SOLUTION SUBCUTANEOUS
Qty: 6 ML | Refills: 0 | Status: SHIPPED | OUTPATIENT
Start: 2025-02-18

## 2025-02-18 RX ORDER — METOPROLOL TARTRATE 100 MG/1
100 TABLET ORAL 2 TIMES DAILY
Status: SHIPPED | COMMUNITY
Start: 2025-02-18

## 2025-02-18 ASSESSMENT — ENCOUNTER SYMPTOMS
NECK STIFFNESS: 0
HEMATURIA: 0
FACIAL SWELLING: 0
ABDOMINAL PAIN: 0
WOUND: 0
LOSS OF SENSATION IN FEET: 0
CHEST TIGHTNESS: 0
JOINT SWELLING: 0
SINUS PRESSURE: 0
COUGH: 0
CONSTIPATION: 0
FREQUENCY: 0
DIAPHORESIS: 0
CHILLS: 0
DEPRESSION: 0
BLOOD IN STOOL: 0
PALPITATIONS: 0
FEVER: 0
FATIGUE: 0
EYE ITCHING: 0
SINUS PAIN: 0
RHINORRHEA: 0
NERVOUS/ANXIOUS: 0
EYE REDNESS: 0
FLANK PAIN: 0
EYE DISCHARGE: 0
DYSURIA: 0
POLYDIPSIA: 0
OCCASIONAL FEELINGS OF UNSTEADINESS: 0
POLYPHAGIA: 0
SLEEP DISTURBANCE: 0
ADENOPATHY: 0
TROUBLE SWALLOWING: 0
ARTHRALGIAS: 0
AGITATION: 0
NAUSEA: 0
COLOR CHANGE: 0
SORE THROAT: 0
VOICE CHANGE: 0
VOMITING: 0
DIZZINESS: 0
DIARRHEA: 0
BRUISES/BLEEDS EASILY: 0
APPETITE CHANGE: 0
WHEEZING: 0
SHORTNESS OF BREATH: 0
EYE PAIN: 0

## 2025-02-18 ASSESSMENT — PATIENT HEALTH QUESTIONNAIRE - PHQ9
2. FEELING DOWN, DEPRESSED OR HOPELESS: NOT AT ALL
1. LITTLE INTEREST OR PLEASURE IN DOING THINGS: NOT AT ALL
SUM OF ALL RESPONSES TO PHQ9 QUESTIONS 1 AND 2: 0

## 2025-02-18 NOTE — PATIENT INSTRUCTIONS
Labs reviewed  Refills sent in  F/U in 3 months for HTN and Diabetes and annual  Continue current medications  Call if any new concerns  Please get the RSV vaccine at the pharmacy

## 2025-02-18 NOTE — PROGRESS NOTES
"Subjective   Patient ID: Reza Red is a 64 y.o. male who presents for Follow-up (Dm follow up).  Today he is accompanied by accompanied by spouse.     HPI  Subjective:   Reza Red is a 64 y.o. male with hypertension.  Current Outpatient Medications   Medication Sig Dispense Refill    atorvastatin (Lipitor) 80 mg tablet Take 1 tablet (80 mg) by mouth once daily. 90 tablet 3    blood sugar diagnostic (Accu-Chek Dorie Plus test strp) strip 1 strip 3 times a day. 100 strip 5    blood-glucose meter misc Use to check glucose levels 3 times a day as directed 1 each 0    cholecalciferol (Vitamin D3) 50 mcg (2,000 unit) capsule Take 2 capsules (100 mcg) by mouth once daily.      dapaglifloz propaned-metformin (Xigduo XR) 5-1,000 mg Take 1 tablet by mouth 2 times a day before meals. 180 tablet 0    DULoxetine (Cymbalta) 60 mg DR capsule TAKE ONE CAPSULE BY MOUTH ONE TIME DAILY 90 capsule 0    flash glucose sensor kit (FreeStyle Scotty 2 Sensor) kit Inject 1 each under the skin every 14 (fourteen) days. Use to test 4 times daily as directed. 2 each 11    gabapentin (Neurontin) 600 mg tablet Take 1 tablet (600 mg) by mouth 2 times a day. 180 tablet 0    insulin aspart (NovoLOG Flexpen U-100 Insulin) 100 unit/mL (3 mL) pen Inject 36 Units under the skin 3 times a day with meals. 105 mL 1    insulin degludec (Tresiba FlexTouch U-200) 200 unit/mL (3 mL) injection Inject 68 Units under the skin 2 times a day. 63 mL 1    lancets misc Use to check glucose levels 3 times a day as directed 100 each 0    medical cannabis Take 1 each by mouth.      nabumetone (Relafen) 500 mg tablet TAKE 1 TABLET BY MOUTH ONCE DAILY AS NEEDED FOR MILD PAIN (1-3) OR MODERATE PAIN (4-6) 90 tablet 0    pantoprazole (ProtoNix) 40 mg EC tablet Take 1 tablet (40 mg) by mouth 2 times a day. 180 tablet 1    pen needle, diabetic (BD Ultra-Fine Micro Pen Needle) 32 gauge x 1/4\" needle Use 1 needle 5 times a day with Novolog and Tresiba 500 each 0    valsartan " "(Diovan) 320 mg tablet Take 1 tablet (320 mg) by mouth once daily. 90 tablet 0    metoprolol tartrate (Lopressor) 100 mg tablet Take 1 tablet (100 mg) by mouth 2 times a day.      tirzepatide (Mounjaro) 15 mg/0.5 mL pen injector 15 mg by abdominal subcutaneous route every 7 days. 6 mL 0     No current facility-administered medications for this visit.      Hypertension ROS: taking medications as instructed, no medication side effects noted, no TIA's, no chest pain on exertion, no dyspnea on exertion, no swelling of ankles, no orthostatic dizziness or lightheadedness, and no orthopnea or paroxysmal nocturnal dyspnea.   New concerns: still on his medications. Admits to not taking them all regularly.  He has missed doses and that could be why he gained 15 pounds and also has had no change in his A1c.     Diabetes Mellitus  Patient presents for follow up of diabetes. Current symptoms include: hyperglycemia, paresthesia of the feet, and visual disturbances. Symptoms have gradually worsened. Patient denies increased appetite, nausea, polydipsia, polyuria, visual disturbances, and vomiting. Evaluation to date has included: hemoglobin A1C and microalbuminuria.  Home sugars: BGs are running  consistent with Hgb A1C. Current treatment: per med list. Son is cooking for him and that is helping. Last dilated eye exam: did not go at this time.    Objective:   /86   Pulse 90   Temp 36.9 °C (98.4 °F) (Oral)   Ht 1.803 m (5' 11\")   Wt (!) 153 kg (336 lb 6.4 oz)   SpO2 95%   BMI 46.92 kg/m²      Review of Systems   Constitutional:  Positive for activity change and unexpected weight change. Negative for appetite change, chills, diaphoresis, fatigue and fever.   HENT:  Negative for congestion, dental problem, drooling, ear discharge, ear pain, facial swelling, hearing loss, nosebleeds, postnasal drip, rhinorrhea, sinus pressure, sinus pain, sneezing, sore throat, tinnitus, trouble swallowing and voice change.    Eyes:  " "Negative for pain, discharge, redness, itching and visual disturbance.   Respiratory:  Negative for cough, chest tightness, shortness of breath and wheezing.    Cardiovascular:  Negative for chest pain, palpitations and leg swelling.   Gastrointestinal:  Negative for abdominal pain, blood in stool, constipation, diarrhea, nausea and vomiting.   Endocrine: Negative for cold intolerance, heat intolerance, polydipsia, polyphagia and polyuria.   Genitourinary:  Negative for decreased urine volume, dysuria, flank pain, frequency, hematuria and urgency.   Musculoskeletal:  Positive for back pain, gait problem and myalgias. Negative for arthralgias, joint swelling and neck stiffness.   Skin:  Negative for color change, pallor, rash and wound.   Neurological:  Negative for dizziness.   Hematological:  Negative for adenopathy. Does not bruise/bleed easily.   Psychiatric/Behavioral:  Negative for agitation, behavioral problems and sleep disturbance. The patient is not nervous/anxious.        Objective   /86   Pulse 90   Temp 36.9 °C (98.4 °F) (Oral)   Ht 1.803 m (5' 11\")   Wt (!) 153 kg (336 lb 6.4 oz)   SpO2 95%   BMI 46.92 kg/m²   BSA: 2.77 meters squared  Growth percentiles: Facility age limit for growth %chandrakant is 20 years. Facility age limit for growth %chandrakant is 20 years.     Physical Exam  Vitals and nursing note reviewed. Exam conducted with a chaperone present.   Constitutional:       General: He is not in acute distress.     Appearance: Normal appearance. He is obese. He is not ill-appearing, toxic-appearing or diaphoretic.   HENT:      Head: Normocephalic and atraumatic.      Right Ear: Tympanic membrane, ear canal and external ear normal. There is no impacted cerumen.      Left Ear: Tympanic membrane, ear canal and external ear normal. There is no impacted cerumen.      Nose: No congestion or rhinorrhea.      Mouth/Throat:      Mouth: Mucous membranes are moist.      Pharynx: Oropharynx is clear. No " oropharyngeal exudate or posterior oropharyngeal erythema.   Eyes:      General: No scleral icterus.        Right eye: No discharge.         Left eye: No discharge.      Extraocular Movements: Extraocular movements intact.      Conjunctiva/sclera: Conjunctivae normal.      Pupils: Pupils are equal, round, and reactive to light.   Neck:      Vascular: No carotid bruit.   Cardiovascular:      Rate and Rhythm: Normal rate and regular rhythm.      Pulses: Normal pulses.      Heart sounds: Normal heart sounds. No murmur heard.     No friction rub. No gallop.   Pulmonary:      Effort: Pulmonary effort is normal. No respiratory distress.      Breath sounds: Normal breath sounds. No stridor. No wheezing, rhonchi or rales.   Chest:      Chest wall: No tenderness.   Musculoskeletal:         General: Normal range of motion.      Cervical back: Normal range of motion and neck supple. No rigidity or tenderness.      Right lower leg: Edema present.      Left lower leg: Edema present.   Lymphadenopathy:      Cervical: No cervical adenopathy.   Skin:     General: Skin is warm and dry.   Neurological:      General: No focal deficit present.      Mental Status: He is alert and oriented to person, place, and time.   Psychiatric:         Mood and Affect: Mood normal.         Behavior: Behavior normal.         Thought Content: Thought content normal.         Judgment: Judgment normal.         Assessment/Plan   Problem List Items Addressed This Visit             ICD-10-CM    Hyperlipidemia associated with type 2 diabetes mellitus (Multi) E11.69, E78.5    Morbid obesity with body mass index (BMI) of 40.0 or higher (Multi) E66.01    Diabetes mellitus with hyperglycemia, with long-term current use of insulin - Primary E11.65, Z79.4    Relevant Medications    tirzepatide (Mounjaro) 15 mg/0.5 mL pen injector    Other Relevant Orders    POCT glycosylated hemoglobin (Hb A1C) manually resulted    Type 2 diabetes mellitus with diabetic  "neuropathy, with long-term current use of insulin E11.40, Z79.4    Relevant Medications    tirzepatide (Mounjaro) 15 mg/0.5 mL pen injector    SVT (supraventricular tachycardia) (CMS-[facility]) I47.10    Relevant Medications    metoprolol tartrate (Lopressor) 100 mg tablet       Current Outpatient Medications   Medication Sig Dispense Refill    atorvastatin (Lipitor) 80 mg tablet Take 1 tablet (80 mg) by mouth once daily. 90 tablet 3    blood sugar diagnostic (Accu-Chek Dorie Plus test strp) strip 1 strip 3 times a day. 100 strip 5    blood-glucose meter misc Use to check glucose levels 3 times a day as directed 1 each 0    cholecalciferol (Vitamin D3) 50 mcg (2,000 unit) capsule Take 2 capsules (100 mcg) by mouth once daily.      dapaglifloz propaned-metformin (Xigduo XR) 5-1,000 mg Take 1 tablet by mouth 2 times a day before meals. 180 tablet 0    DULoxetine (Cymbalta) 60 mg DR capsule TAKE ONE CAPSULE BY MOUTH ONE TIME DAILY 90 capsule 0    flash glucose sensor kit (FreeStyle Scotty 2 Sensor) kit Inject 1 each under the skin every 14 (fourteen) days. Use to test 4 times daily as directed. 2 each 11    gabapentin (Neurontin) 600 mg tablet Take 1 tablet (600 mg) by mouth 2 times a day. 180 tablet 0    insulin aspart (NovoLOG Flexpen U-100 Insulin) 100 unit/mL (3 mL) pen Inject 36 Units under the skin 3 times a day with meals. 105 mL 1    insulin degludec (Tresiba FlexTouch U-200) 200 unit/mL (3 mL) injection Inject 68 Units under the skin 2 times a day. 63 mL 1    lancets misc Use to check glucose levels 3 times a day as directed 100 each 0    medical cannabis Take 1 each by mouth.      nabumetone (Relafen) 500 mg tablet TAKE 1 TABLET BY MOUTH ONCE DAILY AS NEEDED FOR MILD PAIN (1-3) OR MODERATE PAIN (4-6) 90 tablet 0    pantoprazole (ProtoNix) 40 mg EC tablet Take 1 tablet (40 mg) by mouth 2 times a day. 180 tablet 1    pen needle, diabetic (BD Ultra-Fine Micro Pen Needle) 32 gauge x 1/4\" needle Use 1 needle 5 times a " day with Novolog and Tresiba 500 each 0    valsartan (Diovan) 320 mg tablet Take 1 tablet (320 mg) by mouth once daily. 90 tablet 0    metoprolol tartrate (Lopressor) 100 mg tablet Take 1 tablet (100 mg) by mouth 2 times a day.      tirzepatide (Mounjaro) 15 mg/0.5 mL pen injector 15 mg by abdominal subcutaneous route every 7 days. 6 mL 0     No current facility-administered medications for this visit.     Specialty Consultation notes reviewed  Labs reviewed  Refills sent in  F/U in 3 months for HTN and Diabetes and annual  Continue current medications  Call if any new concerns  Please get the RSV vaccine at the pharmacy    I personally spent over 50% of a total 40 minutes in counseling and discussion with the patient and coordination of care as described above.

## 2025-02-19 ASSESSMENT — ENCOUNTER SYMPTOMS
UNEXPECTED WEIGHT CHANGE: 1
MYALGIAS: 1
BACK PAIN: 1
ACTIVITY CHANGE: 1

## 2025-02-20 ENCOUNTER — PATIENT OUTREACH (OUTPATIENT)
Dept: PRIMARY CARE | Facility: CLINIC | Age: 65
End: 2025-02-20
Payer: COMMERCIAL

## 2025-02-20 DIAGNOSIS — Z00.00 ENCOUNTER FOR ANNUAL GENERAL MEDICAL EXAMINATION WITHOUT ABNORMAL FINDINGS IN ADULT: ICD-10-CM

## 2025-02-20 DIAGNOSIS — I10 ESSENTIAL (PRIMARY) HYPERTENSION: ICD-10-CM

## 2025-02-20 DIAGNOSIS — E55.9 VITAMIN D DEFICIENCY: ICD-10-CM

## 2025-02-20 DIAGNOSIS — E11.40 TYPE 2 DIABETES MELLITUS WITH DIABETIC NEUROPATHY, WITH LONG-TERM CURRENT USE OF INSULIN: ICD-10-CM

## 2025-02-20 DIAGNOSIS — Z12.5 ENCOUNTER FOR SCREENING FOR MALIGNANT NEOPLASM OF PROSTATE: ICD-10-CM

## 2025-02-20 DIAGNOSIS — Z79.4 TYPE 2 DIABETES MELLITUS WITH DIABETIC NEUROPATHY, WITH LONG-TERM CURRENT USE OF INSULIN: ICD-10-CM

## 2025-02-20 DIAGNOSIS — R73.9 ELEVATED BLOOD SUGAR: ICD-10-CM

## 2025-02-20 DIAGNOSIS — E53.8 VITAMIN B 12 DEFICIENCY: ICD-10-CM

## 2025-02-28 NOTE — PROGRESS NOTES
"Patient is sent at the request of Alis Jones MD for my opinion regarding Type 2 diabetes.  My final recommendations will be communicated back to the requesting provider by way of shared medical record.    Last appt with PCP: 2/18/25    Subjective     HPI    Past Medical History:  He has a past medical history of Anemia, Foot ulcer due to secondary DM (Multi) (06/03/2024), Personal history of other endocrine, nutritional and metabolic disease (09/17/2021), Personal history of other infectious and parasitic diseases (11/12/2021), and Personal history of other specified conditions (01/18/2022).    Past Surgical History:  He has no past surgical history on file.    Social History:  He reports that he has never smoked. He has never been exposed to tobacco smoke. He has never used smokeless tobacco. He reports that he does not currently use alcohol. He reports that he does not use drugs.    Family History:  Family History   Problem Relation Name Age of Onset    Other (cardiac disorder) Father      Other (cerebrovascular accident) Father      Cancer Father      Thyroid disease Other         Allergies:  Patient has no known allergies.    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  He is filling his Scotty through [employer].    Hypoglycemia frequency: none reported  Hypoglycemia awareness: Yes     Adverse Effects: none     Objective     Last Recorded Vitals:  BP Readings from Last 6 Encounters:   02/18/25 126/86   11/27/24 117/79   11/11/24 111/75   08/01/24 112/71   06/03/24 99/67   04/25/24 93/66        Wt Readings from Last 6 Encounters:   02/18/25 (!) 153 kg (336 lb 6.4 oz)   11/27/24 146 kg (321 lb 3.2 oz)   11/11/24 147 kg (323 lb 12.8 oz)   08/01/24 146 kg (321 lb 9.6 oz)   06/03/24 " "147 kg (324 lb 8 oz)   04/25/24 147 kg (324 lb 4 oz)     Diabetes Pharmacotherapy:  Xigduo 5-1000 BID  Tresiba U-200 80 units BID  Novolog U-100 40 units TID WM  Mounjaro 15 mg weekly    Primary/Secondary Prevention   - Statin? Yes - Atorvastatin 80 mg   - ACE-I/ARB? Yes  - Aspirin? No    Pertinent PMH Review:  - PMH of Pancreatitis: No  - PMH of Retinopathy: No  - PMH of Urinary Tract Infections: No  - PMH of MTC: No    Lab Review  Lab Results   Component Value Date    BILITOT 0.7 04/25/2024    CALCIUM 9.3 11/04/2024    CO2 23 11/04/2024     11/04/2024    CREATININE 0.83 11/04/2024    GLUCOSE 236 (H) 11/04/2024    ALKPHOS 71 04/25/2024    K 4.1 11/04/2024    PROT 8.0 04/25/2024     11/04/2024    AST 22 04/25/2024    ALT 23 04/25/2024    BUN 12 11/04/2024    ANIONGAP 16 11/04/2024    MG 2.54 (H) 01/17/2024    ALBUMIN 4.5 04/25/2024    GFRMALE 69 11/11/2022     Lab Results   Component Value Date    TRIG 199 (H) 11/04/2024    CHOL 206 (H) 11/04/2024    LDLCALC 123 (H) 11/04/2024    HDL 43.5 11/04/2024     Lab Results   Component Value Date    HGBA1C 8.4 (A) 02/18/2025    HGBA1C 8.5 (A) 11/11/2024    HGBA1C 9.7 (A) 08/01/2024     No components found for: \"UACR\"  The 10-year ASCVD risk score (Sury SMALL, et al., 2019) is: 23.3%    Values used to calculate the score:      Age: 64 years      Sex: Male      Is Non- : No      Diabetic: Yes      Tobacco smoker: No      Systolic Blood Pressure: 126 mmHg      Is BP treated: Yes      HDL Cholesterol: 43.5 mg/dL      Total Cholesterol: 206 mg/dL    Health Maintenance:   Foot Exam: performs regularly, due at next PCP appt, has lost a toenail recently, some neuropathy  Eye Exam: 5 months ago  Lipid Panel: updated  Influenza Immunization: updated  Pneumonia Immunization: Done, due at age 67    Drug Interactions:  None to consider at this time    Assessment/Plan   Problem List Items Addressed This Visit       Type 2 diabetes mellitus with diabetic " neuropathy, with long-term current use of insulin - Primary    Relevant Medications    lancets misc    insulin degludec (Tresiba FlexTouch U-200) 200 unit/mL (3 mL) injection    insulin aspart (NovoLOG Flexpen U-100 Insulin) 100 unit/mL (3 mL) pen    Other Relevant Orders    Referral to Clinical Pharmacy           ASSESSMENT:    Patients diabetes is poorly controlled, however improved, with most recent A1c of 8.5% (Goal < 7%).    Reports BG consistently > 200. Discussed FBG goal of  in the morning. Patient agreeable to increase Tresiba by ~10% to 90 units BID.     Patient often forgets nighttime doses of medication due to falling asleep in his chair. Discussed several options to improve adherence including potentially changing dosing of Xigduo to 10mg - 1000 mg in the morning, however, patient still has a full bottle of current dose at home that he wants to use. Can make this change in the future to improve adherence if needed.    During appointment, patient set a reminder on his AI assistant to remind him to take his nighttime medications at 8 pm everyday, which will hopefully improve adherence and BG values. Discussed importance of lowering BG values to avoid developing micro/macro vascular complications in the future.       PLAN:  Increase Tresiba U-200 to 90 units BID    CONTINUE:  Continue Novalog 40 units TID WM  Xigduo 5-1000 BID  Mounjaro 15 mg weekly      Follow up: 3/31/25 @ 9:40    Norma Woodall, PharmD    Continue all meds under the continuation of care with the referring provider and clinical pharmacy team.

## 2025-03-03 ENCOUNTER — APPOINTMENT (OUTPATIENT)
Dept: PHARMACY | Facility: HOSPITAL | Age: 65
End: 2025-03-03
Payer: COMMERCIAL

## 2025-03-03 DIAGNOSIS — E11.40 TYPE 2 DIABETES MELLITUS WITH DIABETIC NEUROPATHY, WITH LONG-TERM CURRENT USE OF INSULIN: Primary | ICD-10-CM

## 2025-03-03 DIAGNOSIS — Z79.4 TYPE 2 DIABETES MELLITUS WITH DIABETIC NEUROPATHY, WITH LONG-TERM CURRENT USE OF INSULIN: Primary | ICD-10-CM

## 2025-03-03 RX ORDER — LANCETS
EACH MISCELLANEOUS
Qty: 100 EACH | Refills: 3 | Status: SHIPPED | OUTPATIENT
Start: 2025-03-03

## 2025-03-03 RX ORDER — INSULIN ASPART 100 [IU]/ML
40 INJECTION, SOLUTION INTRAVENOUS; SUBCUTANEOUS
Qty: 45 ML | Refills: 5 | Status: SHIPPED | OUTPATIENT
Start: 2025-03-03

## 2025-03-03 RX ORDER — INSULIN DEGLUDEC 200 U/ML
90 INJECTION, SOLUTION SUBCUTANEOUS NIGHTLY
Qty: 27 ML | Refills: 3 | Status: SHIPPED | OUTPATIENT
Start: 2025-03-03

## 2025-03-10 DIAGNOSIS — I10 ESSENTIAL (PRIMARY) HYPERTENSION: ICD-10-CM

## 2025-03-11 RX ORDER — VALSARTAN 320 MG/1
320 TABLET ORAL DAILY
Qty: 90 TABLET | Refills: 0 | Status: SHIPPED | OUTPATIENT
Start: 2025-03-11

## 2025-03-20 ENCOUNTER — PATIENT OUTREACH (OUTPATIENT)
Dept: PRIMARY CARE | Facility: CLINIC | Age: 65
End: 2025-03-20
Payer: COMMERCIAL

## 2025-03-20 DIAGNOSIS — Z12.5 ENCOUNTER FOR SCREENING FOR MALIGNANT NEOPLASM OF PROSTATE: ICD-10-CM

## 2025-03-20 DIAGNOSIS — I10 ESSENTIAL (PRIMARY) HYPERTENSION: ICD-10-CM

## 2025-03-20 DIAGNOSIS — E11.40 TYPE 2 DIABETES MELLITUS WITH DIABETIC NEUROPATHY, WITH LONG-TERM CURRENT USE OF INSULIN: ICD-10-CM

## 2025-03-20 DIAGNOSIS — Z79.4 TYPE 2 DIABETES MELLITUS WITH DIABETIC NEUROPATHY, WITH LONG-TERM CURRENT USE OF INSULIN: ICD-10-CM

## 2025-03-20 NOTE — PROGRESS NOTES
Chart review completed prior to CCM outreach. Call attempted to patient. No answer. Left message to call back.   Patient is still engaging with pharmacy on a monthly basis. Notes reviewed.

## 2025-03-28 NOTE — PROGRESS NOTES
"Patient is sent at the request of Alis Jones MD for my opinion regarding Type 2 diabetes.  My final recommendations will be communicated back to the requesting provider by way of shared medical record.    Last appt with PCP: 2/18/25  Next appt: 6/17/25    Subjective     HPI    Past Medical History:  He has a past medical history of Anemia, Foot ulcer due to secondary DM (Multi) (06/03/2024), Personal history of other endocrine, nutritional and metabolic disease (09/17/2021), Personal history of other infectious and parasitic diseases (11/12/2021), and Personal history of other specified conditions (01/18/2022).    Past Surgical History:  He has no past surgical history on file.    Social History:  He reports that he has never smoked. He has never been exposed to tobacco smoke. He has never used smokeless tobacco. He reports that he does not currently use alcohol. He reports that he does not use drugs.    Family History:  Family History   Problem Relation Name Age of Onset    Other (cardiac disorder) Father      Other (cerebrovascular accident) Father      Cancer Father      Thyroid disease Other         Allergies:  Patient has no known allergies.    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  He is filling his Scotty through [employer].    Hypoglycemia frequency: none reported  Hypoglycemia awareness: Yes     Adverse Effects: none     Objective     Last Recorded Vitals:  BP Readings from Last 6 Encounters:   02/18/25 126/86   11/27/24 117/79   11/11/24 111/75   08/01/24 112/71   06/03/24 99/67   04/25/24 93/66        Wt Readings from Last 6 Encounters:   02/18/25 (!) 153 kg (336 lb 6.4 oz)   11/27/24 146 kg (321 lb 3.2 oz)   11/11/24 147 kg (323 lb 12.8 oz)   08/01/24 146 kg (321 " "lb 9.6 oz)   06/03/24 147 kg (324 lb 8 oz)   04/25/24 147 kg (324 lb 4 oz)     Diabetes Pharmacotherapy:  Xigduo 5-1000 BID  Tresiba U-200 90 units BID  Novolog U-100 40 units TID WM  Mounjaro 15 mg weekly    Primary/Secondary Prevention   - Statin? Yes - Atorvastatin 80 mg   - ACE-I/ARB? Yes  - Aspirin? No    Pertinent PMH Review:  - PMH of Pancreatitis: No  - PMH of Retinopathy: No  - PMH of Urinary Tract Infections: No  - PMH of MTC: No    Lab Review  Lab Results   Component Value Date    BILITOT 0.7 04/25/2024    CALCIUM 9.3 11/04/2024    CO2 23 11/04/2024     11/04/2024    CREATININE 0.83 11/04/2024    GLUCOSE 236 (H) 11/04/2024    ALKPHOS 71 04/25/2024    K 4.1 11/04/2024    PROT 8.0 04/25/2024     11/04/2024    AST 22 04/25/2024    ALT 23 04/25/2024    BUN 12 11/04/2024    ANIONGAP 16 11/04/2024    MG 2.54 (H) 01/17/2024    ALBUMIN 4.5 04/25/2024    GFRMALE 69 11/11/2022     Lab Results   Component Value Date    TRIG 199 (H) 11/04/2024    CHOL 206 (H) 11/04/2024    LDLCALC 123 (H) 11/04/2024    HDL 43.5 11/04/2024     Lab Results   Component Value Date    HGBA1C 8.4 (A) 02/18/2025    HGBA1C 8.5 (A) 11/11/2024    HGBA1C 9.7 (A) 08/01/2024     No components found for: \"UACR\"  The 10-year ASCVD risk score (Sury DK, et al., 2019) is: 23.3%    Values used to calculate the score:      Age: 64 years      Sex: Male      Is Non- : No      Diabetic: Yes      Tobacco smoker: No      Systolic Blood Pressure: 126 mmHg      Is BP treated: Yes      HDL Cholesterol: 43.5 mg/dL      Total Cholesterol: 206 mg/dL    Health Maintenance:   Foot Exam: performs regularly, due at next PCP appt, has lost a toenail recently, some neuropathy  Eye Exam: 5 months ago  Lipid Panel: updated  Influenza Immunization: updated  Pneumonia Immunization: Done, due at age 67    Drug Interactions:  None to consider at this time    Assessment/Plan   Problem List Items Addressed This Visit       Type 2 diabetes " mellitus with diabetic neuropathy, with long-term current use of insulin    Relevant Medications    insulin aspart (NovoLOG Flexpen U-100 Insulin) 100 unit/mL (3 mL) pen    insulin degludec (Tresiba FlexTouch U-200) 200 unit/mL (3 mL) pen    Other Relevant Orders    Referral to Clinical Pharmacy     ASSESSMENT:    Patients diabetes is poorly controlled, however improved, with most recent A1c of 8.4% (Goal < 7%).    Reports BG consistently 230-250. Discussed FBG goal of  in the morning.    Patient often forgets nighttime doses of medication due to falling asleep in his chair. Discussed several options to improve adherence including potentially changing dosing of Xigduo to 10mg - 1000 mg in the morning, however, patient still has a full bottle of current dose at home that he wants to use. Can make this change in the future to improve adherence if needed.    During appointment, patient set a reminder on his AI assistant to remind him to take his nighttime medications at 8 pm everyday, which will hopefully improve adherence and BG values. Discussed importance of lowering BG values to avoid developing micro/macro vascular complications in the future.       PLAN:  10% total insulin adjustment  Increase Tresiba U-200 to 100 units BID  Increase Novalog to 44 units TID WM    CONTINUE:  Xigduo 5-1000 BID  Mounjaro 15 mg weekly      Follow up: 4/28/25 @ 9:40    Wilfrido Mckeon RPh    Continue all meds under the continuation of care with the referring provider and clinical pharmacy team.

## 2025-03-31 ENCOUNTER — APPOINTMENT (OUTPATIENT)
Dept: PHARMACY | Facility: HOSPITAL | Age: 65
End: 2025-03-31
Payer: COMMERCIAL

## 2025-03-31 DIAGNOSIS — E11.40 TYPE 2 DIABETES MELLITUS WITH DIABETIC NEUROPATHY, WITH LONG-TERM CURRENT USE OF INSULIN: ICD-10-CM

## 2025-03-31 DIAGNOSIS — Z79.4 TYPE 2 DIABETES MELLITUS WITH DIABETIC NEUROPATHY, WITH LONG-TERM CURRENT USE OF INSULIN: ICD-10-CM

## 2025-03-31 RX ORDER — INSULIN ASPART 100 [IU]/ML
44 INJECTION, SOLUTION INTRAVENOUS; SUBCUTANEOUS
Qty: 45 ML | Refills: 5 | Status: SHIPPED | OUTPATIENT
Start: 2025-03-31

## 2025-03-31 RX ORDER — INSULIN DEGLUDEC 200 U/ML
100 INJECTION, SOLUTION SUBCUTANEOUS NIGHTLY
Qty: 27 ML | Refills: 3 | Status: SHIPPED | OUTPATIENT
Start: 2025-03-31

## 2025-04-23 ENCOUNTER — PATIENT OUTREACH (OUTPATIENT)
Dept: PRIMARY CARE | Facility: CLINIC | Age: 65
End: 2025-04-23
Payer: COMMERCIAL

## 2025-04-23 DIAGNOSIS — I10 ESSENTIAL (PRIMARY) HYPERTENSION: ICD-10-CM

## 2025-04-23 DIAGNOSIS — Z79.4 TYPE 2 DIABETES MELLITUS WITH DIABETIC NEUROPATHY, WITH LONG-TERM CURRENT USE OF INSULIN: ICD-10-CM

## 2025-04-23 DIAGNOSIS — E11.40 TYPE 2 DIABETES MELLITUS WITH DIABETIC NEUROPATHY, WITH LONG-TERM CURRENT USE OF INSULIN: ICD-10-CM

## 2025-04-23 NOTE — PROGRESS NOTES
Chart review completed prior to CCM outreach. Call attempted to patient. No answer. Left message to call back.   I did leave on my message a reminder that he has a phone appt with Wilfrido on Monday 4/28 at 9:40 am.

## 2025-04-28 ENCOUNTER — APPOINTMENT (OUTPATIENT)
Dept: PHARMACY | Facility: HOSPITAL | Age: 65
End: 2025-04-28
Payer: COMMERCIAL

## 2025-04-29 ENCOUNTER — PATIENT OUTREACH (OUTPATIENT)
Dept: PRIMARY CARE | Facility: CLINIC | Age: 65
End: 2025-04-29
Payer: COMMERCIAL

## 2025-04-29 NOTE — LETTER
CASE CLOSE NOTICE    Reza Red   Member ID: [id number]      Group:  None  Sent 04/29/25        Case Information  Reference Number: [id number]    Manager: Xin Garcia RN   PCP: MD Reza Lara   [address] [place] [address]      Dear Mr. Red,    As your , my role has been to know your medical condition and assist you with your care. My role was also to keep an eye on the quality of care according to your needs and the guidelines set up by your doctor. It has been determined that you no longer need case management services.    If you feel this is an error or there is anything that I can assist you with, please contact me at [phone]      Additional Info    A member is discharged from Case Management when any of the following occur:   Member has reached set goals as described by your   Member or family is unwilling to actively participate in the case management process or Plan of Care  Member no longer has Health Plan coverage  Participation in this program is voluntary. You may stop case management services at any time. This will not affect future services. Case management can be restarted.  This happens if there is a change in the health which would require case management services.      Sincerely,    Xin Garcia RN    Care Management  Payor: [facility] / Plan: [facility] / Product Type: *No Product type* /

## 2025-04-29 NOTE — PROGRESS NOTES
Chart review completed prior to CCM outreach. Call attempted to patient. No answer. Left message that this is the 5th call he has missed/not returned from me. He missed his call with Wilfrido yesterday. I will no longer be reaching out and I am removing him from Chronic Care Management.

## 2025-05-06 ENCOUNTER — APPOINTMENT (OUTPATIENT)
Dept: PHARMACY | Facility: HOSPITAL | Age: 65
End: 2025-05-06
Payer: COMMERCIAL

## 2025-05-15 NOTE — PROGRESS NOTES
"Patient is sent at the request of Alis Jones MD for my opinion regarding Type 2 diabetes.  My final recommendations will be communicated back to the requesting provider by way of shared medical record.    Last appt with PCP: 2/18/25  Next appt: 6/17/25    Subjective     HPI    Past Medical History:  He has a past medical history of Anemia, Foot ulcer due to secondary DM (Multi) (06/03/2024), Personal history of other endocrine, nutritional and metabolic disease (09/17/2021), Personal history of other infectious and parasitic diseases (11/12/2021), and Personal history of other specified conditions (01/18/2022).    Past Surgical History:  He has no past surgical history on file.    Social History:  He reports that he has never smoked. He has never been exposed to tobacco smoke. He has never used smokeless tobacco. He reports that he does not currently use alcohol. He reports that he does not use drugs.    Family History:  Family History   Problem Relation Name Age of Onset    Other (cardiac disorder) Father      Other (cerebrovascular accident) Father      Cancer Father      Thyroid disease Other         Allergies:  Patient has no known allergies.    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  He is filling his Scotty through [employer].    Hypoglycemia frequency: none reported  Hypoglycemia awareness: Yes     Adverse Effects: none     Objective     Last Recorded Vitals:  BP Readings from Last 6 Encounters:   02/18/25 126/86   11/27/24 117/79   11/11/24 111/75   08/01/24 112/71   06/03/24 99/67   04/25/24 93/66        Wt Readings from Last 6 Encounters:   02/18/25 (!) 153 kg (336 lb 6.4 oz)   11/27/24 146 kg (321 lb 3.2 oz)   11/11/24 147 kg (323 lb 12.8 oz)   08/01/24 146 kg (321 " "lb 9.6 oz)   06/03/24 147 kg (324 lb 8 oz)   04/25/24 147 kg (324 lb 4 oz)     Diabetes Pharmacotherapy:  Xigduo 5-1000 BID  Tresiba U-200 100 units BID  Novolog U-100 44 units TID WM  Mounjaro 15 mg weekly    Primary/Secondary Prevention   - Statin? Yes - Atorvastatin 80 mg   - ACE-I/ARB? Yes  - Aspirin? No    Pertinent PMH Review:  - PMH of Pancreatitis: No  - PMH of Retinopathy: No  - PMH of Urinary Tract Infections: No  - PMH of MTC: No    Lab Review  Lab Results   Component Value Date    BILITOT 0.7 04/25/2024    CALCIUM 9.3 11/04/2024    CO2 23 11/04/2024     11/04/2024    CREATININE 0.83 11/04/2024    GLUCOSE 236 (H) 11/04/2024    ALKPHOS 71 04/25/2024    K 4.1 11/04/2024    PROT 8.0 04/25/2024     11/04/2024    AST 22 04/25/2024    ALT 23 04/25/2024    BUN 12 11/04/2024    ANIONGAP 16 11/04/2024    MG 2.54 (H) 01/17/2024    ALBUMIN 4.5 04/25/2024    GFRMALE 69 11/11/2022     Lab Results   Component Value Date    TRIG 199 (H) 11/04/2024    CHOL 206 (H) 11/04/2024    LDLCALC 123 (H) 11/04/2024    HDL 43.5 11/04/2024     Lab Results   Component Value Date    HGBA1C 8.4 (A) 02/18/2025    HGBA1C 8.5 (A) 11/11/2024    HGBA1C 9.7 (A) 08/01/2024     No components found for: \"UACR\"  The 10-year ASCVD risk score (Sury DK, et al., 2019) is: 23.3%    Values used to calculate the score:      Age: 64 years      Sex: Male      Is Non- : No      Diabetic: Yes      Tobacco smoker: No      Systolic Blood Pressure: 126 mmHg      Is BP treated: Yes      HDL Cholesterol: 43.5 mg/dL      Total Cholesterol: 206 mg/dL    Health Maintenance:   Foot Exam: performs regularly, due at next PCP appt, has lost a toenail recently, some neuropathy  Eye Exam: 5 months ago  Lipid Panel: updated  Influenza Immunization: updated  Pneumonia Immunization: Done, due at age 67    Drug Interactions:  None to consider at this time    Assessment/Plan   Problem List Items Addressed This Visit       Type 2 diabetes " mellitus with diabetic neuropathy, with long-term current use of insulin - Primary     ASSESSMENT:    Patients diabetes is poorly controlled, however improved, with most recent A1c of 8.4% (Goal < 7%).    Reports BG consistently 230-250. Discussed FBG goal of  in the morning.    Patient often forgets nighttime doses of medication due to falling asleep in his chair. Discussed several options to improve adherence including potentially changing dosing of Xigduo to 10mg - 1000 mg in the morning, however, patient still has a full bottle of current dose at home that he wants to use. Can make this change in the future to improve adherence if needed.    During appointment, patient set a reminder on his AI assistant to remind him to take his nighttime medications at 8 pm everyday, which will hopefully improve adherence and BG values. Discussed importance of lowering BG values to avoid developing micro/macro vascular complications in the future.       PLAN:  10% total insulin adjustment  Increase Tresiba U-200 to 100 units BID  Increase Novalog to 44 units TID WM    CONTINUE:  Xigduo 5-1000 BID  Mounjaro 15 mg weekly      Follow up:     Norma Woodall, PharmD    Continue all meds under the continuation of care with the referring provider and clinical pharmacy team.

## 2025-05-16 ENCOUNTER — APPOINTMENT (OUTPATIENT)
Dept: PHARMACY | Facility: HOSPITAL | Age: 65
End: 2025-05-16
Payer: COMMERCIAL

## 2025-05-16 DIAGNOSIS — Z79.4 TYPE 2 DIABETES MELLITUS WITH DIABETIC NEUROPATHY, WITH LONG-TERM CURRENT USE OF INSULIN: Primary | ICD-10-CM

## 2025-05-16 DIAGNOSIS — E11.40 TYPE 2 DIABETES MELLITUS WITH DIABETIC NEUROPATHY, WITH LONG-TERM CURRENT USE OF INSULIN: Primary | ICD-10-CM

## 2025-06-02 ENCOUNTER — APPOINTMENT (OUTPATIENT)
Dept: PRIMARY CARE | Facility: CLINIC | Age: 65
End: 2025-06-02
Payer: COMMERCIAL

## 2025-06-03 ENCOUNTER — APPOINTMENT (OUTPATIENT)
Dept: PRIMARY CARE | Facility: CLINIC | Age: 65
End: 2025-06-03
Payer: COMMERCIAL

## 2025-06-13 NOTE — PROGRESS NOTES
"Patient is sent at the request of Alis Jones MD for my opinion regarding Type 2 diabetes.  My final recommendations will be communicated back to the requesting provider by way of shared medical record.    Last appt with PCP: 2/18/25  Next appt: 6/17/25    Subjective     HPI    Past Medical History:  He has a past medical history of Anemia, Foot ulcer due to secondary DM (Multi) (06/03/2024), Personal history of other endocrine, nutritional and metabolic disease (09/17/2021), Personal history of other infectious and parasitic diseases (11/12/2021), and Personal history of other specified conditions (01/18/2022).    Past Surgical History:  He has no past surgical history on file.    Social History:  He reports that he has never smoked. He has never been exposed to tobacco smoke. He has never used smokeless tobacco. He reports that he does not currently use alcohol. He reports that he does not use drugs.    Family History:  Family History   Problem Relation Name Age of Onset    Other (cardiac disorder) Father      Other (cerebrovascular accident) Father      Cancer Father      Thyroid disease Other         Allergies:  Patient has no known allergies.    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  He is filling his Scotty through [employer].    Hypoglycemia frequency: none reported  Hypoglycemia awareness: Yes     Adverse Effects: none     Objective     Last Recorded Vitals:  BP Readings from Last 6 Encounters:   02/18/25 126/86   11/27/24 117/79   11/11/24 111/75   08/01/24 112/71   06/03/24 99/67   04/25/24 93/66        Wt Readings from Last 6 Encounters:   02/18/25 (!) 153 kg (336 lb 6.4 oz)   11/27/24 146 kg (321 lb 3.2 oz)   11/11/24 147 kg (323 lb 12.8 oz)   08/01/24 146 kg (321 " "lb 9.6 oz)   06/03/24 147 kg (324 lb 8 oz)   04/25/24 147 kg (324 lb 4 oz)     Diabetes Pharmacotherapy:  Xigduo 5-1000 BID  Tresiba U-200 100 units BID  Novolog U-100 40 units TID WM  Mounjaro 15 mg weekly    Primary/Secondary Prevention   - Statin? Yes - Atorvastatin 80 mg   - ACE-I/ARB? Yes  - Aspirin? No    Pertinent PMH Review:  - PMH of Pancreatitis: No  - PMH of Retinopathy: No  - PMH of Urinary Tract Infections: No  - PMH of MTC: No    Lab Review  Lab Results   Component Value Date    BILITOT 0.7 04/25/2024    CALCIUM 9.3 11/04/2024    CO2 23 11/04/2024     11/04/2024    CREATININE 0.83 11/04/2024    GLUCOSE 236 (H) 11/04/2024    ALKPHOS 71 04/25/2024    K 4.1 11/04/2024    PROT 8.0 04/25/2024     11/04/2024    AST 22 04/25/2024    ALT 23 04/25/2024    BUN 12 11/04/2024    ANIONGAP 16 11/04/2024    MG 2.54 (H) 01/17/2024    ALBUMIN 4.5 04/25/2024    GFRMALE 69 11/11/2022     Lab Results   Component Value Date    TRIG 199 (H) 11/04/2024    CHOL 206 (H) 11/04/2024    LDLCALC 123 (H) 11/04/2024    HDL 43.5 11/04/2024     Lab Results   Component Value Date    HGBA1C 8.4 (A) 02/18/2025    HGBA1C 8.5 (A) 11/11/2024    HGBA1C 9.7 (A) 08/01/2024     No components found for: \"UACR\"  The 10-year ASCVD risk score (Sury DK, et al., 2019) is: 23.3%    Values used to calculate the score:      Age: 64 years      Sex: Male      Is Non- : No      Diabetic: Yes      Tobacco smoker: No      Systolic Blood Pressure: 126 mmHg      Is BP treated: Yes      HDL Cholesterol: 43.5 mg/dL      Total Cholesterol: 206 mg/dL    Health Maintenance:   Foot Exam: performs regularly, due at next PCP appt, has lost a toenail recently, some neuropathy  Eye Exam: 5 months ago  Lipid Panel: updated  Influenza Immunization: updated  Pneumonia Immunization: Done, due at age 67    Drug Interactions:  None to consider at this time    Assessment/Plan   Problem List Items Addressed This Visit       Diabetes " mellitus with hyperglycemia, with long-term current use of insulin - Primary    Relevant Medications    blood-glucose sensor (FreeStyle Scotty 2 Plus Sensor) device    Other Relevant Orders    Referral to Clinical Pharmacy    Type 2 diabetes mellitus with diabetic neuropathy, with long-term current use of insulin    Relevant Medications    blood-glucose sensor (FreeStyle Scotty 2 Plus Sensor) device    Other Relevant Orders    Referral to Clinical Pharmacy       ASSESSMENT:    Patients diabetes is poorly controlled, however improved, with most recent A1c of 8.4% (Goal < 7%).    No BG readings available at visit as he ran out of sensors last week. Reports BG before running out of sensors remain elevated (~200 throughout the day). Refill sent to [facility] for Freestyle Scotty sensors  Due for updated A1c. Lab ordered. Reminded patient to get this drawn before appointment with PCP tomorrow.  Increase Novalog by 10% to 44 units TID WM  Patient reports he has been using 40 units TID WM  Continue other medications as prescribed  Clinical Pharmacy follow up: 7/15/2025 @ 3:00      Norma Woodall, PharmD    Continue all meds under the continuation of care with the referring provider and clinical pharmacy team.

## 2025-06-16 ENCOUNTER — APPOINTMENT (OUTPATIENT)
Dept: PHARMACY | Facility: HOSPITAL | Age: 65
End: 2025-06-16
Payer: COMMERCIAL

## 2025-06-16 DIAGNOSIS — E11.40 TYPE 2 DIABETES MELLITUS WITH DIABETIC NEUROPATHY, WITH LONG-TERM CURRENT USE OF INSULIN: ICD-10-CM

## 2025-06-16 DIAGNOSIS — E11.65 TYPE 2 DIABETES MELLITUS WITH HYPERGLYCEMIA, WITH LONG-TERM CURRENT USE OF INSULIN: Primary | ICD-10-CM

## 2025-06-16 DIAGNOSIS — Z79.4 TYPE 2 DIABETES MELLITUS WITH HYPERGLYCEMIA, WITH LONG-TERM CURRENT USE OF INSULIN: Primary | ICD-10-CM

## 2025-06-16 DIAGNOSIS — Z79.4 TYPE 2 DIABETES MELLITUS WITH DIABETIC NEUROPATHY, WITH LONG-TERM CURRENT USE OF INSULIN: ICD-10-CM

## 2025-06-16 RX ORDER — BLOOD-GLUCOSE SENSOR
EACH MISCELLANEOUS
Qty: 6 EACH | Refills: 3 | Status: SHIPPED | OUTPATIENT
Start: 2025-06-16

## 2025-06-17 ENCOUNTER — APPOINTMENT (OUTPATIENT)
Dept: PRIMARY CARE | Facility: CLINIC | Age: 65
End: 2025-06-17
Payer: COMMERCIAL

## 2025-06-17 LAB — PSA SERPL-MCNC: 1.02 NG/ML

## 2025-06-18 LAB
25(OH)D3+25(OH)D2 SERPL-MCNC: 30 NG/ML (ref 30–100)
ALBUMIN SERPL-MCNC: 4.5 G/DL (ref 3.6–5.1)
ALP SERPL-CCNC: 61 U/L (ref 35–144)
ALT SERPL-CCNC: 22 U/L (ref 9–46)
ANION GAP SERPL CALCULATED.4IONS-SCNC: 11 MMOL/L (CALC) (ref 7–17)
AST SERPL-CCNC: 15 U/L (ref 10–35)
BASOPHILS # BLD AUTO: 104 CELLS/UL (ref 0–200)
BASOPHILS NFR BLD AUTO: 0.9 %
BILIRUB SERPL-MCNC: 0.5 MG/DL (ref 0.2–1.2)
BUN SERPL-MCNC: 14 MG/DL (ref 7–25)
CALCIUM SERPL-MCNC: 9.2 MG/DL (ref 8.6–10.3)
CHLORIDE SERPL-SCNC: 101 MMOL/L (ref 98–110)
CHOLEST SERPL-MCNC: 202 MG/DL
CHOLEST/HDLC SERPL: 3.9 (CALC)
CO2 SERPL-SCNC: 26 MMOL/L (ref 20–32)
CREAT SERPL-MCNC: 0.88 MG/DL (ref 0.7–1.35)
EGFRCR SERPLBLD CKD-EPI 2021: 97 ML/MIN/1.73M2
EOSINOPHIL # BLD AUTO: 437 CELLS/UL (ref 15–500)
EOSINOPHIL NFR BLD AUTO: 3.8 %
ERYTHROCYTE [DISTWIDTH] IN BLOOD BY AUTOMATED COUNT: 13 % (ref 11–15)
EST. AVERAGE GLUCOSE BLD GHB EST-MCNC: 214 MG/DL
EST. AVERAGE GLUCOSE BLD GHB EST-SCNC: 11.9 MMOL/L
GLUCOSE SERPL-MCNC: 253 MG/DL (ref 65–99)
HBA1C MFR BLD: 9.1 %
HCT VFR BLD AUTO: 43.5 % (ref 38.5–50)
HDLC SERPL-MCNC: 52 MG/DL
HGB BLD-MCNC: 14.1 G/DL (ref 13.2–17.1)
LDLC SERPL CALC-MCNC: 113 MG/DL (CALC)
LYMPHOCYTES # BLD AUTO: 3887 CELLS/UL (ref 850–3900)
LYMPHOCYTES NFR BLD AUTO: 33.8 %
MCH RBC QN AUTO: 30.3 PG (ref 27–33)
MCHC RBC AUTO-ENTMCNC: 32.4 G/DL (ref 32–36)
MCV RBC AUTO: 93.5 FL (ref 80–100)
MONOCYTES # BLD AUTO: 472 CELLS/UL (ref 200–950)
MONOCYTES NFR BLD AUTO: 4.1 %
NEUTROPHILS # BLD AUTO: 6601 CELLS/UL (ref 1500–7800)
NEUTROPHILS NFR BLD AUTO: 57.4 %
NONHDLC SERPL-MCNC: 150 MG/DL (CALC)
PLATELET # BLD AUTO: 235 THOUSAND/UL (ref 140–400)
PMV BLD REES-ECKER: 11.8 FL (ref 7.5–12.5)
POTASSIUM SERPL-SCNC: 4.1 MMOL/L (ref 3.5–5.3)
PROT SERPL-MCNC: 7.4 G/DL (ref 6.1–8.1)
RBC # BLD AUTO: 4.65 MILLION/UL (ref 4.2–5.8)
SODIUM SERPL-SCNC: 138 MMOL/L (ref 135–146)
TRIGL SERPL-MCNC: 257 MG/DL
TSH SERPL-ACNC: 3.12 MIU/L (ref 0.4–4.5)
VIT B12 SERPL-MCNC: 340 PG/ML (ref 200–1100)
WBC # BLD AUTO: 11.5 THOUSAND/UL (ref 3.8–10.8)

## 2025-07-08 ENCOUNTER — APPOINTMENT (OUTPATIENT)
Dept: PRIMARY CARE | Facility: CLINIC | Age: 65
End: 2025-07-08
Payer: COMMERCIAL

## 2025-07-15 ENCOUNTER — APPOINTMENT (OUTPATIENT)
Dept: PHARMACY | Facility: HOSPITAL | Age: 65
End: 2025-07-15
Payer: COMMERCIAL

## 2025-07-15 DIAGNOSIS — E11.65 TYPE 2 DIABETES MELLITUS WITH HYPERGLYCEMIA, WITH LONG-TERM CURRENT USE OF INSULIN: ICD-10-CM

## 2025-07-15 DIAGNOSIS — Z79.4 TYPE 2 DIABETES MELLITUS WITH DIABETIC NEUROPATHY, WITH LONG-TERM CURRENT USE OF INSULIN: ICD-10-CM

## 2025-07-15 DIAGNOSIS — E11.40 TYPE 2 DIABETES MELLITUS WITH DIABETIC NEUROPATHY, WITH LONG-TERM CURRENT USE OF INSULIN: ICD-10-CM

## 2025-07-15 DIAGNOSIS — Z79.4 TYPE 2 DIABETES MELLITUS WITH HYPERGLYCEMIA, WITH LONG-TERM CURRENT USE OF INSULIN: ICD-10-CM

## 2025-07-15 RX ORDER — DAPAGLIFLOZIN AND METFORMIN HYDROCHLORIDE 5; 1000 MG/1; MG/1
1 TABLET, FILM COATED, EXTENDED RELEASE ORAL
Qty: 180 TABLET | Refills: 3 | Status: SHIPPED | OUTPATIENT
Start: 2025-07-15

## 2025-07-15 RX ORDER — EZETIMIBE 10 MG/1
10 TABLET ORAL DAILY
Qty: 30 TABLET | Refills: 11 | Status: SHIPPED | OUTPATIENT
Start: 2025-07-15 | End: 2026-07-15

## 2025-07-15 NOTE — PROGRESS NOTES
"Patient is sent at the request of Alis Jones MD for my opinion regarding Type 2 diabetes.  My final recommendations will be communicated back to the requesting provider by way of shared medical record.    Last appt with PCP: 2/18/25, 6/17/25  Next appt: to schedule    Subjective     HPI    Past Medical History:  He has a past medical history of Anemia, Foot ulcer due to secondary DM (Multi) (06/03/2024), Personal history of other endocrine, nutritional and metabolic disease (09/17/2021), Personal history of other infectious and parasitic diseases (11/12/2021), and Personal history of other specified conditions (01/18/2022).    Past Surgical History:  He has no past surgical history on file.    Social History:  He reports that he has never smoked. He has never been exposed to tobacco smoke. He has never used smokeless tobacco. He reports that he does not currently use alcohol. He reports that he does not use drugs.    Family History:  Family History   Problem Relation Name Age of Onset    Other (cardiac disorder) Father      Other (cerebrovascular accident) Father      Cancer Father      Thyroid disease Other         Allergies:  Patient has no known allergies.    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  He is filling his Scotty through [employer].    Hypoglycemia frequency: none reported  Hypoglycemia awareness: Yes     Adverse Effects: none     Objective     Last Recorded Vitals:  BP Readings from Last 6 Encounters:   06/17/25 102/67   02/18/25 126/86   11/27/24 117/79   11/11/24 111/75   08/01/24 112/71   06/03/24 99/67        Wt Readings from Last 6 Encounters:   06/17/25 (!) 161 kg (354 lb 12.8 oz)   02/18/25 (!) 153 kg (336 lb 6.4 oz)   11/27/24 146 kg (321 lb 3.2 oz) " "  11/11/24 147 kg (323 lb 12.8 oz)   08/01/24 146 kg (321 lb 9.6 oz)   06/03/24 147 kg (324 lb 8 oz)     Diabetes Pharmacotherapy:  Xigduo 5-1000 BID  Tresiba U-200 100 units BID  Novolog U-100 40 units TIDAC  Mounjaro 15 mg weekly    Primary/Secondary Prevention   - Statin? Yes - Atorvastatin 80 mg   - ACE-I/ARB? Yes  - Aspirin? No    Pertinent PMH Review:  - PMH of Pancreatitis: No  - PMH of Retinopathy: No  - PMH of Urinary Tract Infections: No  - PMH of MTC: No    Lab Review  Lab Results   Component Value Date    BILITOT 0.5 06/17/2025    CALCIUM 9.2 06/17/2025    CO2 26 06/17/2025     06/17/2025    CREATININE 0.88 06/17/2025    GLUCOSE 253 (H) 06/17/2025    ALKPHOS 61 06/17/2025    K 4.1 06/17/2025    PROT 7.4 06/17/2025     06/17/2025    AST 15 06/17/2025    ALT 22 06/17/2025    BUN 14 06/17/2025    ANIONGAP 11 06/17/2025    MG 2.54 (H) 01/17/2024    ALBUMIN 4.5 06/17/2025    GFRMALE 69 11/11/2022     Lab Results   Component Value Date    TRIG 257 (H) 06/17/2025    CHOL 202 (H) 06/17/2025    LDLCALC 113 (H) 06/17/2025    HDL 52 06/17/2025     Lab Results   Component Value Date    HGBA1C 9.1 (H) 06/17/2025    HGBA1C 8.4 (A) 02/18/2025    HGBA1C 8.5 (A) 11/11/2024     No components found for: \"UACR\"  The 10-year ASCVD risk score (Sury SMALL, et al., 2019) is: 14.6%    Values used to calculate the score:      Age: 64 years      Sex: Male      Is Non- : No      Diabetic: Yes      Tobacco smoker: No      Systolic Blood Pressure: 102 mmHg      Is BP treated: Yes      HDL Cholesterol: 52 mg/dL      Total Cholesterol: 202 mg/dL    Health Maintenance:   Foot Exam: performs regularly, due at next PCP appt, has lost a toenail recently, some neuropathy  Eye Exam: 5 months ago  Lipid Panel: updated  Influenza Immunization: updated  Pneumonia Immunization: Done, due at age 67    Drug Interactions:  None to consider at this time    Assessment/Plan   Problem List Items Addressed This Visit  "      Diabetes mellitus with hyperglycemia, with long-term current use of insulin    Relevant Medications    dapaglifloz propaned-metformin (Xigduo XR) 5-1,000 mg    ezetimibe (Zetia) 10 mg tablet    Other Relevant Orders    Referral to Clinical Pharmacy    Type 2 diabetes mellitus with diabetic neuropathy, with long-term current use of insulin     ASSESSMENT:    Significant diet change since son had a job change    Noticing satiety benefit with using Mounjaro    Nonadherent to Scotty schedule    Noted improved activity level around the house    Patients diabetes is poorly controlled, with most recent A1c of 9.1% (Goal < 7%).    No BG readings available at visit as he ran out of sensors last week.   Start ezetimibe 10 mg daily  Continue other medications as prescribed  Clinical Pharmacy follow up: 7/29/25 @ 1000      Wilfrido Mckeon RPh    Continue all meds under the continuation of care with the referring provider and clinical pharmacy team.

## 2025-07-29 ENCOUNTER — APPOINTMENT (OUTPATIENT)
Dept: PHARMACY | Facility: HOSPITAL | Age: 65
End: 2025-07-29
Payer: COMMERCIAL

## 2025-07-29 ENCOUNTER — TELEPHONE (OUTPATIENT)
Dept: PHARMACY | Facility: HOSPITAL | Age: 65
End: 2025-07-29

## 2025-08-04 DIAGNOSIS — E11.65 TYPE 2 DIABETES MELLITUS WITH HYPERGLYCEMIA, WITH LONG-TERM CURRENT USE OF INSULIN: ICD-10-CM

## 2025-08-04 DIAGNOSIS — Z79.4 TYPE 2 DIABETES MELLITUS WITH HYPERGLYCEMIA, WITH LONG-TERM CURRENT USE OF INSULIN: ICD-10-CM

## 2025-08-04 DIAGNOSIS — Z79.4 TYPE 2 DIABETES MELLITUS WITH DIABETIC NEUROPATHY, WITH LONG-TERM CURRENT USE OF INSULIN: ICD-10-CM

## 2025-08-04 DIAGNOSIS — E11.40 TYPE 2 DIABETES MELLITUS WITH DIABETIC NEUROPATHY, WITH LONG-TERM CURRENT USE OF INSULIN: ICD-10-CM

## 2025-08-04 RX ORDER — TIRZEPATIDE 15 MG/.5ML
INJECTION, SOLUTION SUBCUTANEOUS
Qty: 6 ML | Refills: 0 | Status: SHIPPED | OUTPATIENT
Start: 2025-08-04

## 2025-08-12 ENCOUNTER — APPOINTMENT (OUTPATIENT)
Dept: PHARMACY | Facility: HOSPITAL | Age: 65
End: 2025-08-12
Payer: COMMERCIAL

## 2025-08-19 ENCOUNTER — APPOINTMENT (OUTPATIENT)
Dept: PHARMACY | Facility: HOSPITAL | Age: 65
End: 2025-08-19
Payer: COMMERCIAL

## 2025-09-09 ENCOUNTER — APPOINTMENT (OUTPATIENT)
Dept: PHARMACY | Facility: HOSPITAL | Age: 65
End: 2025-09-09
Payer: COMMERCIAL